# Patient Record
Sex: FEMALE | Race: BLACK OR AFRICAN AMERICAN | NOT HISPANIC OR LATINO | Employment: UNEMPLOYED | URBAN - METROPOLITAN AREA
[De-identification: names, ages, dates, MRNs, and addresses within clinical notes are randomized per-mention and may not be internally consistent; named-entity substitution may affect disease eponyms.]

---
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
## 2018-06-29 ENCOUNTER — HOSPITAL ENCOUNTER (EMERGENCY)
Facility: HOSPITAL | Age: 18
Discharge: HOME | End: 2018-06-29
Attending: EMERGENCY MEDICINE
Payer: MEDICAID

## 2018-06-29 VITALS
BODY MASS INDEX: 51.91 KG/M2 | RESPIRATION RATE: 16 BRPM | WEIGHT: 293 LBS | SYSTOLIC BLOOD PRESSURE: 112 MMHG | HEIGHT: 63 IN | TEMPERATURE: 98 F | HEART RATE: 98 BPM | DIASTOLIC BLOOD PRESSURE: 69 MMHG | OXYGEN SATURATION: 100 %

## 2018-06-29 DIAGNOSIS — K21.9 GASTROESOPHAGEAL REFLUX DISEASE WITHOUT ESOPHAGITIS: ICD-10-CM

## 2018-06-29 DIAGNOSIS — M62.838 NECK MUSCLE SPASM: Primary | ICD-10-CM

## 2018-06-29 LAB
B-HCG UR QL: NEGATIVE
CTP QC/QA: YES

## 2018-06-29 PROCEDURE — 99284 EMERGENCY DEPT VISIT MOD MDM: CPT | Mod: 25

## 2018-06-29 PROCEDURE — 81025 URINE PREGNANCY TEST: CPT | Performed by: EMERGENCY MEDICINE

## 2018-06-29 RX ORDER — PANTOPRAZOLE SODIUM 20 MG/1
20 TABLET, DELAYED RELEASE ORAL DAILY
Qty: 30 TABLET | Refills: 0 | Status: SHIPPED | OUTPATIENT
Start: 2018-06-29 | End: 2018-10-23 | Stop reason: SDUPTHER

## 2018-06-29 RX ORDER — NAPROXEN 500 MG/1
500 TABLET ORAL 2 TIMES DAILY WITH MEALS
Qty: 60 TABLET | Refills: 0 | Status: SHIPPED | OUTPATIENT
Start: 2018-06-29 | End: 2018-11-06

## 2018-06-29 RX ORDER — METHOCARBAMOL 750 MG/1
1500 TABLET, FILM COATED ORAL EVERY 6 HOURS
Qty: 24 TABLET | Refills: 0 | Status: SHIPPED | OUTPATIENT
Start: 2018-06-29 | End: 2018-07-02

## 2018-06-30 NOTE — ED PROVIDER NOTES
Encounter Date: 6/29/2018    SCRIBE #1 NOTE: I, Suzy Barros, am scribing for, and in the presence of,  Dr. Cordova . I have scribed the entire note.       History     Chief Complaint   Patient presents with    Neck Pain     pt c/o neck pain, upper shoulder pain, and pressure behind her ears for 3 days.     This is a 18 y.o female who presents with neck pain for 3 days. She notes her pain is on both side of her neck and is constant. She describes her pain as dull. She hs associated HA. She rates her pain as a 5/10. She denies weakness, leg swelling, and neck injury.      The history is provided by the patient.     Review of patient's allergies indicates:   Allergen Reactions    Ibuprofen Hives     Past Medical History:   Diagnosis Date    ADHD (attention deficit hyperactivity disorder)     Anxiety     Constipation     Depression     GERD (gastroesophageal reflux disease)     IBS (irritable bowel syndrome)     Ovarian cyst     Schizophrenia in children      History reviewed. No pertinent surgical history.  Family History   Problem Relation Age of Onset    Asthma Mother     Diabetes Father      Social History   Substance Use Topics    Smoking status: Passive Smoke Exposure - Never Smoker    Smokeless tobacco: Never Used    Alcohol use No     Review of Systems   Constitutional: Negative for chills and fever.   HENT: Negative for congestion and sore throat.    Cardiovascular: Negative for leg swelling.   Musculoskeletal: Positive for neck pain.   Neurological: Positive for headaches. Negative for weakness.   All other systems reviewed and are negative.      Physical Exam     Initial Vitals [06/29/18 2231]   BP Pulse Resp Temp SpO2   137/82 98 20 98.2 °F (36.8 °C) 99 %      MAP       --         Physical Exam    Nursing note and vitals reviewed.  Constitutional: She appears well-developed and well-nourished. She is not diaphoretic. No distress.   HENT:   Head: Normocephalic and atraumatic.   Eyes: EOM are  normal. Pupils are equal, round, and reactive to light.   Neck: Normal range of motion and phonation normal. Neck supple. No stridor present. Muscular tenderness present. No spinous process tenderness present. No tracheal deviation present. No neck rigidity.   Sensation is fully intact.     Cardiovascular: Normal rate, regular rhythm, normal heart sounds and intact distal pulses. Exam reveals no gallop and no friction rub.    No murmur heard.  Pulmonary/Chest: Breath sounds normal. No respiratory distress. She has no wheezes. She has no rhonchi. She has no rales.   Musculoskeletal: Normal range of motion.   Neurological: She is alert and oriented to person, place, and time.   Skin: Skin is warm and dry.         ED Course   Procedures  Labs Reviewed   POCT URINE PREGNANCY          Imaging Results    None          Medical Decision Making:   Clinical Tests:   Lab Tests: Ordered and Reviewed            Scribe Attestation:   Scribe #1: I performed the above scribed service and the documentation accurately describes the services I performed. I attest to the accuracy of the note.          Labs Reviewed  Admission on 06/29/2018   Component Date Value Ref Range Status    POC Preg Test, Ur 06/29/2018 Negative  Negative Final     Acceptable 06/29/2018 Yes   Final        Imaging Reviewed    Imaging Results    None         Medications given in ED    Medications - No data to display    This document was produced by a scribe under my direction and in my presence. I agree with the content of the note and have made any necessary edits.     Lobo Cordova MD         Note was created using voice recognition software. Note may have occasional typographical errors that may not have been identified and edited despite good chito initial review prior to signing.  Discharge Medications     Medication List with Changes/Refills   New Medications    METHOCARBAMOL (ROBAXIN) 750 MG TAB    Take 2 tablets (1,500 mg total) by mouth  every 6 (six) hours. for 3 days    NAPROXEN (NAPROSYN) 500 MG TABLET    Take 1 tablet (500 mg total) by mouth 2 (two) times daily with meals.    PANTOPRAZOLE (PROTONIX) 20 MG TABLET    Take 1 tablet (20 mg total) by mouth once daily.   Current Medications    ACETAMINOPHEN (TYLENOL) 500 MG TABLET    Take 1 tablet (500 mg total) by mouth every 6 (six) hours as needed for Pain.    CLONAZEPAM (KLONOPIN) 1 MG TABLET        CLONAZEPAM (KLONOPIN) 2 MG TAB    TK 1 T PO  BID    CLOTRIMAZOLE (LOTRIMIN) 1 % CREA    Place 1 suppository (1 applicator total) vaginally every evening.    DEXTROAMPHETAMINE-AMPHETAMINE (ADDERALL XR) 30 MG 24 HR CAPSULE        FLUOXETINE (PROZAC) 40 MG CAPSULE    TK ONE C PO  QAM    FLUTICASONE (FLONASE) 50 MCG/ACTUATION NASAL SPRAY    2 sprays (100 mcg total) by Each Nare route once daily.    LAMOTRIGINE (LAMICTAL) 25 MG TABLET    Take 25 mg by mouth once daily.    LORATADINE (CLARITIN) 10 MG TABLET    Take 1 tablet (10 mg total) by mouth once daily.    NORGESTIMATE-ETHINYL ESTRADIOL (MONONESSA, 28,) 0.25-35 MG-MCG PER TABLET    Take 1 tablet by mouth once daily.    PROMETHAZINE-DEXTROMETHORPHAN (PROMETHAZINE-DM) 6.25-15 MG/5 ML SYRP    1 teaspoon PO Q 8 hrs PRN cough. DO NOT DRIVE AFTER TAKING MED    TOPIRAMATE (TOPAMAX) 100 MG TABLET        TOPIRAMATE (TOPAMAX) 25 MG TABLET    Take 1 tablet (25 mg total) by mouth 2 (two) times daily.   Discontinued Medications    NAPROXEN (NAPROSYN) 500 MG TABLET    Take 1 tablet (500 mg total) by mouth 2 (two) times daily with meals.    PANTOPRAZOLE (PROTONIX) 20 MG TABLET    Take 20 mg by mouth once daily.             Patient discharged to home in stable condition with instructions to:   1. Please take all meds as prescribed.  2. Follow-up with your primary care doctor   3. Return precautions discussed and patient and/or family/caretaker understands to return to the emergency room for any concerns including worsening of your current symptoms, fever, chills, night  sweats, worsening pain, chest pain, shortness of breath, nausea, vomiting, diarrhea, bleeding, headache, difficulty talking, visual disturbances, weakness, numbness or any other acute concerns          Clinical Impression:     1. Neck muscle spasm    2. Gastroesophageal reflux disease without esophagitis                                 Lobo Cordova MD  07/23/18 2127

## 2018-09-12 ENCOUNTER — HOSPITAL ENCOUNTER (EMERGENCY)
Facility: HOSPITAL | Age: 18
Discharge: HOME | End: 2018-09-12
Attending: EMERGENCY MEDICINE
Payer: MEDICAID

## 2018-09-12 VITALS
TEMPERATURE: 99 F | RESPIRATION RATE: 16 BRPM | HEIGHT: 63 IN | OXYGEN SATURATION: 100 % | HEART RATE: 95 BPM | DIASTOLIC BLOOD PRESSURE: 70 MMHG | BODY MASS INDEX: 51.38 KG/M2 | WEIGHT: 290 LBS | SYSTOLIC BLOOD PRESSURE: 118 MMHG

## 2018-09-12 DIAGNOSIS — J40 BRONCHITIS: Primary | ICD-10-CM

## 2018-09-12 LAB
B-HCG UR QL: NEGATIVE
CTP QC/QA: YES

## 2018-09-12 PROCEDURE — 99283 EMERGENCY DEPT VISIT LOW MDM: CPT

## 2018-09-12 PROCEDURE — 81025 URINE PREGNANCY TEST: CPT | Performed by: EMERGENCY MEDICINE

## 2018-09-12 RX ORDER — PREDNISONE 10 MG/1
10 TABLET ORAL DAILY
Qty: 5 TABLET | Refills: 0 | Status: SHIPPED | OUTPATIENT
Start: 2018-09-12 | End: 2018-09-17

## 2018-09-12 RX ORDER — ALBUTEROL SULFATE 90 UG/1
2 AEROSOL, METERED RESPIRATORY (INHALATION) EVERY 6 HOURS PRN
Qty: 6.7 G | Refills: 0 | Status: SHIPPED | OUTPATIENT
Start: 2018-09-12 | End: 2019-08-19

## 2018-09-13 NOTE — ED PROVIDER NOTES
Encounter Date: 9/12/2018    SCRIBE #1 NOTE: I, Kelly Silva, am scribing for, and in the presence of,  Dr. Howard. I have scribed the following portions of the note - Other sections scribed: HPI, ROS, PE.       History     Chief Complaint   Patient presents with    Shortness of Breath     for one week has feeling short of breath, hard to breath,     18 year old female presents to the ED complaining of shortness of breath for one week. She reports her breathing is worse when she moves around. Denies hx of pneumonia or asthma. She notes a dry nonproductive cough and states she just got over a recent cold.  Positive sick contacts with multiple family members who have had the same symptoms. She notes she has been dehydrated.        The history is provided by the patient.     Review of patient's allergies indicates:   Allergen Reactions    Ibuprofen Hives     Past Medical History:   Diagnosis Date    ADHD (attention deficit hyperactivity disorder)     Anxiety     Constipation     Depression     GERD (gastroesophageal reflux disease)     IBS (irritable bowel syndrome)     Ovarian cyst     Schizophrenia in children      No past surgical history on file.  Family History   Problem Relation Age of Onset    Asthma Mother     Diabetes Father      Social History     Tobacco Use    Smoking status: Passive Smoke Exposure - Never Smoker    Smokeless tobacco: Never Used   Substance Use Topics    Alcohol use: No    Drug use: No     Review of Systems   Constitutional: Negative.  Negative for fever.   HENT: Negative.  Negative for congestion, rhinorrhea and sore throat.    Respiratory: Positive for cough (dry) and shortness of breath.    Cardiovascular: Negative.  Negative for chest pain.   Gastrointestinal: Negative.  Negative for nausea and vomiting.   Genitourinary: Negative.  Negative for dysuria.   Musculoskeletal: Negative.  Negative for myalgias.   Skin: Negative.  Negative for rash.   Neurological:  Negative.  Negative for headaches.   Hematological: Negative.  Negative for adenopathy.   Psychiatric/Behavioral: Negative.  Negative for behavioral problems.   All other systems reviewed and are negative.      Physical Exam     Initial Vitals [09/12/18 1957]   BP Pulse Resp Temp SpO2   113/89 92 18 99 °F (37.2 °C) 99 %      MAP       --         Physical Exam    Nursing note and vitals reviewed.  Constitutional: She appears well-developed and well-nourished. She is Obese .   HENT:   Head: Normocephalic and atraumatic.   Right Ear: Tympanic membrane normal.   Left Ear: Tympanic membrane normal.   Nose: Nose normal.   Mouth/Throat: Uvula is midline.   Eyes: Conjunctivae are normal.   Neck: Normal range of motion. Neck supple.   Cardiovascular: Normal rate.   Pulmonary/Chest: She has wheezes.   Mild end expiratory wheezes heard best at the bases.  And intermittent   Musculoskeletal: Normal range of motion.   Neurological: She is alert and oriented to person, place, and time.   Skin: Skin is warm and dry. Capillary refill takes less than 2 seconds.   Psychiatric: She has a normal mood and affect. Her behavior is normal.         ED Course   Procedures  Labs Reviewed   POCT URINE PREGNANCY          Imaging Results    None                     Scribe Attestation:   Scribe #1: I performed the above scribed service and the documentation accurately describes the services I performed. I attest to the accuracy of the note.    This document was produced by a scribe under my direction and in my presence. I agree with the content of the note and have made any necessary edits.     Rock Howard MD    09/12/2018 8:04 PM           Clinical Impression:     1. Bronchitis                                   Rock Howard MD  09/12/18 2004

## 2018-09-13 NOTE — ED NOTES
"Pt presents with c/o SOB x1 episode last week; Hx of anxienty; pt reports current "sinus cold" with non-prodictuve cough; denies Hx of resp problems; pt able to speak in complete sentences; skin warm, dry; no resp distress noted; resp clear, E/U; pt on RA; NADN: will continue to monitor  "

## 2018-10-01 ENCOUNTER — TELEPHONE (OUTPATIENT)
Dept: ENDOSCOPY | Facility: HOSPITAL | Age: 18
End: 2018-10-01

## 2018-10-01 NOTE — TELEPHONE ENCOUNTER
----- Message from Azul Ibanez sent at 9/27/2018 12:05 PM CDT -----  Contact: self - [phone]  Has acid reflux - is asking for appt - nothing available for me to book - please call patient at

## 2018-11-06 ENCOUNTER — HOSPITAL ENCOUNTER (EMERGENCY)
Facility: HOSPITAL | Age: 18
Discharge: HOME | End: 2018-11-06
Attending: EMERGENCY MEDICINE
Payer: MEDICAID

## 2018-11-06 VITALS
OXYGEN SATURATION: 98 % | SYSTOLIC BLOOD PRESSURE: 118 MMHG | TEMPERATURE: 98 F | WEIGHT: 287 LBS | HEART RATE: 86 BPM | RESPIRATION RATE: 18 BRPM | HEIGHT: 63 IN | BODY MASS INDEX: 50.85 KG/M2 | DIASTOLIC BLOOD PRESSURE: 71 MMHG

## 2018-11-06 DIAGNOSIS — R10.2 SUPRAPUBIC ABDOMINAL PAIN: Primary | ICD-10-CM

## 2018-11-06 LAB
B-HCG UR QL: NEGATIVE
BILIRUB UR QL STRIP: NEGATIVE
C TRACH DNA SPEC QL NAA+PROBE: NOT DETECTED
CLARITY UR: ABNORMAL
COLOR UR: YELLOW
CTP QC/QA: YES
GLUCOSE UR QL STRIP: NEGATIVE
HGB UR QL STRIP: NEGATIVE
KETONES UR QL STRIP: NEGATIVE
LEUKOCYTE ESTERASE UR QL STRIP: NEGATIVE
N GONORRHOEA DNA SPEC QL NAA+PROBE: NOT DETECTED
NITRITE UR QL STRIP: NEGATIVE
PH UR STRIP: 5 [PH] (ref 5–8)
PROT UR QL STRIP: NEGATIVE
SP GR UR STRIP: 1.03 (ref 1–1.03)
URN SPEC COLLECT METH UR: ABNORMAL
UROBILINOGEN UR STRIP-ACNC: NEGATIVE EU/DL

## 2018-11-06 PROCEDURE — 25000003 PHARM REV CODE 250: Performed by: EMERGENCY MEDICINE

## 2018-11-06 PROCEDURE — 81025 URINE PREGNANCY TEST: CPT | Performed by: EMERGENCY MEDICINE

## 2018-11-06 PROCEDURE — 96365 THER/PROPH/DIAG IV INF INIT: CPT

## 2018-11-06 PROCEDURE — 81003 URINALYSIS AUTO W/O SCOPE: CPT

## 2018-11-06 PROCEDURE — 99284 EMERGENCY DEPT VISIT MOD MDM: CPT | Mod: 25

## 2018-11-06 PROCEDURE — 87491 CHLMYD TRACH DNA AMP PROBE: CPT

## 2018-11-06 PROCEDURE — 63600175 PHARM REV CODE 636 W HCPCS: Performed by: EMERGENCY MEDICINE

## 2018-11-06 RX ORDER — ONDANSETRON 4 MG/1
4 TABLET, FILM COATED ORAL EVERY 8 HOURS PRN
Qty: 12 TABLET | Refills: 0 | Status: SHIPPED | OUTPATIENT
Start: 2018-11-06 | End: 2019-05-20

## 2018-11-06 RX ORDER — CEFTRIAXONE 1 G/1
1 INJECTION, POWDER, FOR SOLUTION INTRAMUSCULAR; INTRAVENOUS
Status: DISCONTINUED | OUTPATIENT
Start: 2018-11-06 | End: 2018-11-06

## 2018-11-06 RX ORDER — POLYETHYLENE GLYCOL 3350 17 G/17G
17 POWDER, FOR SOLUTION ORAL 3 TIMES DAILY PRN
Qty: 24 PACKET | Refills: 0 | Status: SHIPPED | OUTPATIENT
Start: 2018-11-06 | End: 2019-05-20

## 2018-11-06 RX ORDER — AZITHROMYCIN 250 MG/1
1000 TABLET, FILM COATED ORAL
Status: COMPLETED | OUTPATIENT
Start: 2018-11-06 | End: 2018-11-06

## 2018-11-06 RX ORDER — DICYCLOMINE HYDROCHLORIDE 20 MG/1
20 TABLET ORAL EVERY 6 HOURS PRN
Qty: 20 TABLET | Refills: 0 | Status: SHIPPED | OUTPATIENT
Start: 2018-11-06 | End: 2019-05-20

## 2018-11-06 RX ORDER — ERGOCALCIFEROL 1.25 MG/1
50000 CAPSULE ORAL
COMMUNITY
End: 2019-08-19

## 2018-11-06 RX ADMIN — AZITHROMYCIN 1000 MG: 250 TABLET, FILM COATED ORAL at 08:11

## 2018-11-06 RX ADMIN — CEFTRIAXONE 1 G: 1 INJECTION, SOLUTION INTRAVENOUS at 08:11

## 2018-11-06 NOTE — ED PROVIDER NOTES
Encounter Date: 11/6/2018    SCRIBE #1 NOTE: I, Nini Mora, am scribing for, and in the presence of,  Mitchel Cintron MD. I have scribed the following portions of the note - Other sections scribed: HPI, ROS.       History     Chief Complaint   Patient presents with    Nausea     Pt c/o lower back pain, nausea, and lower abdominal pain that started yesterday after eating fried chicken wings.  Denies taking pain medication.  Denies fever, vaginal dicharge, pain/burning when urinating.     Abdominal Pain    Back Pain     CC: Nausea    HPI: This is a 18 y.o. F who has GERD, IBS, Ovarian cyst, Depression, Anxiety, ADHD, and Schizophrenia in children who presents to the ED for emergent evaluation of acute nausea that began yesterday. Pt has associated constipation, and pelvic pain. Her LMP was 11/1/2018. She also reports headache to the posterior aspect of the head that she attributes to typical migraine headache. No known drug allergies. No surgical history. Pt denies fever, chills, ear pain, sore throat, eye pain, cough, SOB, CP, abdominal pain, nausea, vomiting, diarrhea, back pain, dysuria, vaginal discharge, arm or leg problems, rash, tobacco use, or alcohol use.      The history is provided by the patient. No  was used.     Review of patient's allergies indicates:   Allergen Reactions    Ibuprofen Hives     Past Medical History:   Diagnosis Date    ADHD (attention deficit hyperactivity disorder)     Anxiety     Constipation     Depression     GERD (gastroesophageal reflux disease)     IBS (irritable bowel syndrome)     Ovarian cyst     Schizophrenia in children      History reviewed. No pertinent surgical history.  Family History   Problem Relation Age of Onset    Asthma Mother     Diabetes Father      Social History     Tobacco Use    Smoking status: Passive Smoke Exposure - Never Smoker    Smokeless tobacco: Never Used   Substance Use Topics    Alcohol use: No    Drug use:  No     Review of Systems   Constitutional: Negative for chills and fever.   HENT: Negative for ear pain and sore throat.    Eyes: Negative for pain.   Respiratory: Negative for cough and shortness of breath.    Cardiovascular: Negative for chest pain.   Gastrointestinal: Positive for constipation and nausea. Negative for abdominal pain, diarrhea and vomiting.   Genitourinary: Positive for pelvic pain. Negative for dysuria and vaginal discharge.   Musculoskeletal: Negative for back pain.        (-) arm or leg problems   Skin: Negative for rash.   Neurological: Positive for headaches.       Physical Exam     Initial Vitals [11/06/18 0559]   BP Pulse Resp Temp SpO2   115/74 91 16 99.1 °F (37.3 °C) 98 %      MAP       --         Physical Exam  The patient was examined specifically for the following:   General:No significant distress, Good color, Warm and dry. Head and neck:Scalp atraumatic, Neck supple. Neurological:Appropriate conversation, Gross motor deficits. Eyes:Conjugate gaze, Clear corneas. ENT: No epistaxis. Cardiac: Regular rate and rhythm, Grossly normal heart tones. Pulmonary: Wheezing, Rales. Gastrointestinal: Abdominal tenderness, Abdominal distention. Musculoskeletal: Extremity deformity, Apparent pain with range of motion of the joints. Skin: Rash.   The findings on examination were normal except for the following:  There was minimal suprapubic abdominal tenderness. Pelvic examination reveals some scant yellowish discharge. There is no uterine or cervical motion tenderness. The lungs are clear the heart tones are normal.  ED Course   Procedures  Labs Reviewed   URINALYSIS, REFLEX TO URINE CULTURE - Abnormal; Notable for the following components:       Result Value    Appearance, UA Hazy (*)     All other components within normal limits    Narrative:     Preferred Collection Type->Urine, Clean Catch   C. TRACHOMATIS/N. GONORRHOEAE BY AMP DNA   POCT URINE PREGNANCY          Imaging Results    None        Medical decision making:  Given the above this patient presents to the emergency room with mild suprapubic abdominal pain. Patient has scant yellow vaginal discharge. There was no adnexal tenderness mass cervical motion tenderness uterine tenderness.  Patient's urinalysis is negative the patient had a slight yellow discharge. She was treated empirically with Rocephin and Zithromax.  I will discharge her on dicyclomine and Zofran to follow up primary care.  I will also use ibuprofen.  I specifically doubt appendicitis urinary tract infection pelvic inflammatory disease torsion of an ovary.                Scribe Attestation:   Scribe #1: I performed the above scribed service and the documentation accurately describes the services I performed. I attest to the accuracy of the note.    Attending Attestation:           Physician Attestation for Scribe:  Physician Attestation Statement for Scribe #1: I, Mitchel Cintron MD, reviewed documentation, as scribed by Nini Mora in my presence, and it is both accurate and complete.                    Clinical Impression:   The encounter diagnosis was Suprapubic abdominal pain.                             Mitchel Cintron MD  11/06/18 0944

## 2018-11-06 NOTE — ED TRIAGE NOTES
Patient presents to the ER with mother via personal vehicle. Patient presents with lower abdominal pain, nausea without vomiting since yesterday. Reports back pain, and headaches. Denies chest pain, SOB. 5/10 pain

## 2018-11-06 NOTE — DISCHARGE INSTRUCTIONS
PleaseFollow-up with the OBGYN doctor above.  Please return immediately if you get worse or if new problems develop Tylenol and dicyclomine for pain.  Rest.  Zofran for nausea and vomiting.  Clear liquids only while you have nausea and vomiting. Please follow-up with the OBGYN doctor above, or your OBGYN doctor, this week.  Rest.

## 2019-01-28 ENCOUNTER — HOSPITAL ENCOUNTER (EMERGENCY)
Facility: HOSPITAL | Age: 19
Discharge: HOME | End: 2019-01-28
Attending: INTERNAL MEDICINE
Payer: MEDICAID

## 2019-01-28 VITALS
SYSTOLIC BLOOD PRESSURE: 123 MMHG | DIASTOLIC BLOOD PRESSURE: 83 MMHG | TEMPERATURE: 98 F | BODY MASS INDEX: 50.68 KG/M2 | HEIGHT: 63 IN | OXYGEN SATURATION: 100 % | WEIGHT: 286 LBS | HEART RATE: 93 BPM | RESPIRATION RATE: 17 BRPM

## 2019-01-28 DIAGNOSIS — L73.2 HIDRADENITIS AXILLARIS: Primary | ICD-10-CM

## 2019-01-28 LAB
B-HCG UR QL: NEGATIVE
CTP QC/QA: YES

## 2019-01-28 PROCEDURE — 25000003 PHARM REV CODE 250: Mod: ER | Performed by: INTERNAL MEDICINE

## 2019-01-28 PROCEDURE — 81025 URINE PREGNANCY TEST: CPT | Mod: ER | Performed by: INTERNAL MEDICINE

## 2019-01-28 PROCEDURE — 99283 EMERGENCY DEPT VISIT LOW MDM: CPT | Mod: ER

## 2019-01-28 RX ORDER — DOXYCYCLINE 100 MG/1
100 CAPSULE ORAL 2 TIMES DAILY
Qty: 20 CAPSULE | Refills: 0 | Status: SHIPPED | OUTPATIENT
Start: 2019-01-28 | End: 2019-02-07

## 2019-01-28 RX ORDER — DOXYCYCLINE HYCLATE 100 MG
100 TABLET ORAL
Status: COMPLETED | OUTPATIENT
Start: 2019-01-28 | End: 2019-01-28

## 2019-01-28 RX ADMIN — DOXYCYCLINE HYCLATE 100 MG: 100 TABLET, COATED ORAL at 10:01

## 2019-01-29 NOTE — ED PROVIDER NOTES
Encounter Date: 1/28/2019    SCRIBE #1 NOTE: I, Rudolph Guzmán, am scribing for, and in the presence of,  Dr. Lance. I have scribed the following portions of the note - Other sections scribed: HPI, ROS, PE.       History     Chief Complaint   Patient presents with    axilla pain     Pt reports lump in her right axilla that has been there 4 months. Denies any drainage     Felicity Cohen is a 18 y.o. with female PMHx of Schizophrenia who presents to the ED complaining of a lump in her right axilla present for the past 4 months. Pt did not go see her PCP in regards to this lump and she denies drainage. Pt states she shaved and noticed the lump there after, but it began hurting over time and did not heal.      The history is provided by the patient.     Review of patient's allergies indicates:   Allergen Reactions    Ibuprofen Hives     Past Medical History:   Diagnosis Date    ADHD (attention deficit hyperactivity disorder)     Anxiety     Constipation     Depression     GERD (gastroesophageal reflux disease)     IBS (irritable bowel syndrome)     Ovarian cyst     Schizophrenia in children      History reviewed. No pertinent surgical history.  Family History   Problem Relation Age of Onset    Asthma Mother     Diabetes Father      Social History     Tobacco Use    Smoking status: Passive Smoke Exposure - Never Smoker    Smokeless tobacco: Never Used   Substance Use Topics    Alcohol use: No    Drug use: No     Review of Systems   Constitutional: Negative for fever.   HENT: Negative for sore throat.    Respiratory: Negative for shortness of breath.    Cardiovascular: Negative for chest pain.   Gastrointestinal: Negative for nausea.   Genitourinary: Negative for dysuria.   Musculoskeletal: Negative for back pain.   Skin: Negative for rash.        Positive for right axilla skin lump.   Neurological: Negative for weakness.   Hematological: Does not bruise/bleed easily.       Physical Exam     Initial Vitals  [01/28/19 2122]   BP Pulse Resp Temp SpO2   123/83 93 17 98.3 °F (36.8 °C) 100 %      MAP       --         Physical Exam    Nursing note and vitals reviewed.  Constitutional: She appears well-developed and well-nourished. She is Obese .   HENT:   Head: Normocephalic and atraumatic.   Right Ear: External ear normal.   Left Ear: External ear normal.   Nose: Nose normal.   Mouth/Throat: Oropharynx is clear and moist.   Eyes: Conjunctivae and EOM are normal. Pupils are equal, round, and reactive to light.   Neck: Normal range of motion and phonation normal. Neck supple.   Cardiovascular: Normal rate, regular rhythm, normal heart sounds and intact distal pulses. Exam reveals no gallop and no friction rub.    No murmur heard.  Pulmonary/Chest: Effort normal and breath sounds normal. No stridor. No respiratory distress. She has no wheezes. She has no rhonchi. She has no rales. She exhibits no tenderness.   Abdominal: Soft. Bowel sounds are normal. She exhibits no distension. There is no tenderness. There is no rigidity, no rebound and no guarding.   Musculoskeletal: Normal range of motion. She exhibits no edema or tenderness.   Neurological: She is alert and oriented to person, place, and time. She has normal strength. No cranial nerve deficit or sensory deficit. GCS score is 15. GCS eye subscore is 4. GCS verbal subscore is 5. GCS motor subscore is 6.   Skin: Skin is warm and dry. Capillary refill takes less than 2 seconds. No rash noted.        Psychiatric: She has a normal mood and affect. Her behavior is normal.         ED Course   Procedures  Labs Reviewed   POCT URINE PREGNANCY          Imaging Results    None          Medical Decision Making:   History:   Old Medical Records: I decided to obtain old medical records.  Initial Assessment:   Felicity Cohen is a 18 y.o. with female PMHx of Schizophrenia who presents to the ED complaining of a lump in her right axilla present for the past 4 months. Pt did not go see  her PCP in regards to this lump and she denies drainage. Pt states she shaved and noticed the lump there after, but it began hurting over time and did not heal.    Clinical Tests:   Lab Tests: Ordered and Reviewed            Scribe Attestation:   Scribe #1: I performed the above scribed service and the documentation accurately describes the services I performed. I attest to the accuracy of the note.    This document was produced by a scribe under my direction and in my presence. I agree with the content of the note and have made any necessary edits.     Dr. Lance    01/29/2019 1:49 AM             Clinical Impression:     1. Hidradenitis axillaris            Disposition:   Disposition: Discharged  Condition: Stable                        Hemanth Lance MD  01/29/19 0149

## 2019-04-07 ENCOUNTER — HOSPITAL ENCOUNTER (EMERGENCY)
Facility: HOSPITAL | Age: 19
Discharge: HOME | End: 2019-04-07
Attending: EMERGENCY MEDICINE
Payer: MEDICAID

## 2019-04-07 VITALS
SYSTOLIC BLOOD PRESSURE: 99 MMHG | TEMPERATURE: 98 F | BODY MASS INDEX: 51.21 KG/M2 | WEIGHT: 289 LBS | RESPIRATION RATE: 20 BRPM | OXYGEN SATURATION: 100 % | HEIGHT: 63 IN | DIASTOLIC BLOOD PRESSURE: 65 MMHG | HEART RATE: 79 BPM

## 2019-04-07 DIAGNOSIS — R07.9 CHEST PAIN: ICD-10-CM

## 2019-04-07 DIAGNOSIS — R07.89 CHEST WALL PAIN: Primary | ICD-10-CM

## 2019-04-07 LAB
ALBUMIN SERPL-MCNC: 3.9 G/DL
ALP SERPL-CCNC: 62 U/L
B-HCG UR QL: NEGATIVE
BILIRUB SERPL-MCNC: 0.6 MG/DL
BUN SERPL-MCNC: 8 MG/DL
CALCIUM SERPL-MCNC: 9.6 MG/DL
CHLORIDE SERPL-SCNC: 106 MMOL/L
CREAT SERPL-MCNC: 0.8 MG/DL
CTP QC/QA: YES
GLUCOSE SERPL-MCNC: 82 MG/DL (ref 70–110)
POC ALT (SGPT): 26 U/L
POC AST (SGOT): 27 U/L
POC B-TYPE NATRIURETIC PEPTIDE: <5 PG/ML (ref 0–100)
POC CARDIAC TROPONIN I: 0 NG/ML
POC TCO2: 26 MMOL/L
POTASSIUM BLD-SCNC: 3.9 MMOL/L
PROTEIN, POC: 7.4 G/DL
SAMPLE: NORMAL
SODIUM BLD-SCNC: 144 MMOL/L

## 2019-04-07 PROCEDURE — 93005 ELECTROCARDIOGRAM TRACING: CPT | Mod: ER

## 2019-04-07 PROCEDURE — 81025 URINE PREGNANCY TEST: CPT | Mod: ER | Performed by: EMERGENCY MEDICINE

## 2019-04-07 PROCEDURE — 85025 COMPLETE CBC W/AUTO DIFF WBC: CPT | Mod: ER

## 2019-04-07 PROCEDURE — 93010 EKG 12-LEAD: ICD-10-PCS | Mod: ,,, | Performed by: INTERNAL MEDICINE

## 2019-04-07 PROCEDURE — 99285 EMERGENCY DEPT VISIT HI MDM: CPT | Mod: 25,ER

## 2019-04-07 PROCEDURE — 80053 COMPREHEN METABOLIC PANEL: CPT | Mod: ER

## 2019-04-07 PROCEDURE — 25000003 PHARM REV CODE 250: Mod: ER | Performed by: NURSE PRACTITIONER

## 2019-04-07 PROCEDURE — 93010 ELECTROCARDIOGRAM REPORT: CPT | Mod: ,,, | Performed by: INTERNAL MEDICINE

## 2019-04-07 PROCEDURE — 84484 ASSAY OF TROPONIN QUANT: CPT | Mod: ER

## 2019-04-07 PROCEDURE — 83880 ASSAY OF NATRIURETIC PEPTIDE: CPT | Mod: ER

## 2019-04-07 RX ORDER — ACETAMINOPHEN 500 MG
1000 TABLET ORAL ONCE
Status: COMPLETED | OUTPATIENT
Start: 2019-04-07 | End: 2019-04-07

## 2019-04-07 RX ORDER — METHOCARBAMOL 500 MG/1
1000 TABLET, FILM COATED ORAL EVERY 6 HOURS PRN
Qty: 30 TABLET | Refills: 0 | Status: SHIPPED | OUTPATIENT
Start: 2019-04-07 | End: 2019-08-19

## 2019-04-07 RX ADMIN — ACETAMINOPHEN 1000 MG: 500 TABLET, FILM COATED ORAL at 12:04

## 2019-04-07 NOTE — ED PROVIDER NOTES
"Encounter Date: 4/7/2019    SCRIBE #1 NOTE: I, Trista Moe, am scribing for, and in the presence of,  Vi Wall NP . I have scribed the following portions of the note - Other sections scribed: HPI,ROS,PE .       History     Chief Complaint   Patient presents with    Chest Pain     Pt states," Chest pain and and my arms hurt for three days."     17 y/o/f with a hx of GERD presents with chest pain after eating for 3 days. Also complaining of nausea. Denies cough, recent travel or recent surgeries. She does not take birth control.  Patient has a history of ADHD, anxiety GERD and IBS.  Patient states the pain does not feel like her GERD.  Patient states she takes Tums and Omeprazole for GERD.    The history is provided by the patient. No  was used.   Chest Pain   The current episode started several days ago. Chest pain occurs intermittently. Associated with: eating. At its most intense, the chest pain is at 5/10. The chest pain is currently at 5/10. The quality of the pain is described as aching. The pain does not radiate. Primary symptoms include nausea. Pertinent negatives for primary symptoms include no fever, no shortness of breath, no cough, no abdominal pain and no vomiting.   Nausea began yesterday. She tried nothing for the symptoms. Risk factors include obesity.     Review of patient's allergies indicates:   Allergen Reactions    Ibuprofen Hives     Past Medical History:   Diagnosis Date    ADHD (attention deficit hyperactivity disorder)     Anxiety     Constipation     Depression     GERD (gastroesophageal reflux disease)     IBS (irritable bowel syndrome)     Ovarian cyst     Schizophrenia in children      History reviewed. No pertinent surgical history.  Family History   Problem Relation Age of Onset    Asthma Mother     Diabetes Father      Social History     Tobacco Use    Smoking status: Passive Smoke Exposure - Never Smoker    Smokeless tobacco: Never Used "   Substance Use Topics    Alcohol use: No    Drug use: No     Review of Systems   Constitutional: Negative.  Negative for fever.   HENT: Negative.    Eyes: Negative.    Respiratory: Negative.  Negative for cough and shortness of breath.    Cardiovascular: Positive for chest pain.   Gastrointestinal: Positive for nausea. Negative for abdominal pain and vomiting.   Endocrine: Negative.    Genitourinary: Negative.    Musculoskeletal: Negative.    Skin: Negative.    Allergic/Immunologic: Negative.    Neurological: Negative.    Hematological: Negative.    Psychiatric/Behavioral: Negative.    All other systems reviewed and are negative.      Physical Exam     Initial Vitals [04/07/19 1224]   BP Pulse Resp Temp SpO2   131/85 100 16 98 °F (36.7 °C) 99 %      MAP       --         Physical Exam    Nursing note and vitals reviewed.  Constitutional: She appears well-developed.   HENT:   Right Ear: External ear normal.   Left Ear: External ear normal.   Nose: Nose normal.   Mouth/Throat: Oropharynx is clear and moist.   Eyes: Conjunctivae are normal.   Neck: Normal range of motion. Neck supple.   Cardiovascular: Normal rate, regular rhythm, normal heart sounds and intact distal pulses. Exam reveals no gallop and no friction rub.    No murmur heard.  Pulmonary/Chest: Effort normal and breath sounds normal. No respiratory distress. She has no wheezes. She exhibits no bony tenderness and no deformity.   Abdominal: Soft.   Musculoskeletal: Normal range of motion. She exhibits no edema or tenderness.   Neurological: She is alert and oriented to person, place, and time.   Skin: Skin is warm and dry. No rash noted.   Psychiatric: She has a normal mood and affect.         ED Course   Procedures  Labs Reviewed   POCT URINE PREGNANCY     EKG Readings: (Independently Interpreted)   Rhythm: Normal Sinus Rhythm. Heart Rate: 86. Ectopy: No Ectopy. Conduction: Normal. ST Segments: Normal ST Segments. T Waves: Normal. Axis: Normal. Clinical  Impression: Normal Sinus Rhythm   Normal sinus rhythm       Imaging Results    None       Imaging Results          X-Ray Chest PA And Lateral (Final result)  Result time 04/07/19 13:01:30    Final result by Marcos Baxter MD (04/07/19 13:01:30)                 Impression:      No acute abnormality.      Electronically signed by: Marcos Baxter MD  Date:    04/07/2019  Time:    13:01             Narrative:    EXAMINATION:  XR CHEST PA AND LATERAL    CLINICAL HISTORY:  chest wall pain;    TECHNIQUE:  PA and lateral views of the chest were performed.    COMPARISON:  [facility] 06/20/2016    FINDINGS:  The lungs are clear, with normal appearance of pulmonary vasculature and no pleural effusion or pneumothorax.    The cardiac silhouette is normal in size. The hilar and mediastinal contours are unremarkable.    Bones are intact.                                    Medical Decision Making:   Initial Assessment:   19 y/o/f presents with chest pain for 3 days.  Patient states the pain is intermittent.  Patient denies shortness of breath, radiation of pain, and diaphoresis.    Differential Diagnosis:   Acute MI, GERD, Costochondritis, Pneumonia  Independently Interpreted Test(s):   I have ordered and independently interpreted EKG Reading(s) - see prior notes  Clinical Tests:   Lab Tests: Reviewed and Ordered       <> Summary of Lab: CMP- sodium 144, potassium 3.9, bicarb 26, BUN 8, creatinine 0.8,   Troponin 0.00  CBC-- WBC 6.9, hemoglobin 12.8, hematocrit 38.6, platelets 302  Radiological Study: Ordered and Reviewed  Medical Tests: Ordered and Reviewed  ED Management:  Medicated with Tylenol 1000 mg orally. Pt was not given ASA due to allergy to Motrin.   Upon reassessment. Pt stated that the pain increase with movement. She continues to deny heavy lifting.   Discharge home with Robaxin.  Patient instructed to take Tylenol as needed for pain due to Motrin allergies.  Follow-up with PCP in 2 days.  Return ED for worsening of  symptoms.            Scribe Attestation:   Scribe #1: I performed the above scribed service and the documentation accurately describes the services I performed. I attest to the accuracy of the note.    This document was produced by a scribe under my direction and in my presence. I agree with the content of the note and have made any necessary edits.     ANUJA Kurtz    04/07/2019 1:31 PM           Clinical Impression:     1. Chest wall pain    2. Chest pain                                   ANUJA Juarez  04/07/19 [id number]

## 2019-04-07 NOTE — DISCHARGE INSTRUCTIONS
Follow-up with PCP in 1-2 days.  Return ED for worsening of symptoms.  Tylenol as needed for pain.

## 2019-05-20 ENCOUNTER — HOSPITAL ENCOUNTER (EMERGENCY)
Facility: HOSPITAL | Age: 19
Discharge: HOME | End: 2019-05-20
Attending: EMERGENCY MEDICINE
Payer: MEDICAID

## 2019-05-20 VITALS
OXYGEN SATURATION: 96 % | DIASTOLIC BLOOD PRESSURE: 73 MMHG | TEMPERATURE: 98 F | HEART RATE: 82 BPM | RESPIRATION RATE: 18 BRPM | SYSTOLIC BLOOD PRESSURE: 135 MMHG | BODY MASS INDEX: 49.51 KG/M2 | WEIGHT: 290 LBS | HEIGHT: 64 IN

## 2019-05-20 DIAGNOSIS — H92.02 OTALGIA OF LEFT EAR: ICD-10-CM

## 2019-05-20 DIAGNOSIS — J02.9 PHARYNGITIS, UNSPECIFIED ETIOLOGY: Primary | ICD-10-CM

## 2019-05-20 LAB
B-HCG UR QL: NEGATIVE
CTP QC/QA: YES
DEPRECATED S PYO AG THROAT QL EIA: NEGATIVE

## 2019-05-20 PROCEDURE — 99284 EMERGENCY DEPT VISIT MOD MDM: CPT | Mod: 25

## 2019-05-20 PROCEDURE — 81025 URINE PREGNANCY TEST: CPT | Performed by: PHYSICIAN ASSISTANT

## 2019-05-20 PROCEDURE — 87880 STREP A ASSAY W/OPTIC: CPT

## 2019-05-20 PROCEDURE — 96372 THER/PROPH/DIAG INJ SC/IM: CPT

## 2019-05-20 PROCEDURE — 87081 CULTURE SCREEN ONLY: CPT

## 2019-05-20 PROCEDURE — 63600175 PHARM REV CODE 636 W HCPCS: Performed by: PHYSICIAN ASSISTANT

## 2019-05-20 RX ORDER — KETOROLAC TROMETHAMINE 30 MG/ML
15 INJECTION, SOLUTION INTRAMUSCULAR; INTRAVENOUS
Status: COMPLETED | OUTPATIENT
Start: 2019-05-20 | End: 2019-05-20

## 2019-05-20 RX ORDER — FLUTICASONE PROPIONATE 50 MCG
1 SPRAY, SUSPENSION (ML) NASAL 2 TIMES DAILY PRN
Qty: 15 G | Refills: 0 | Status: SHIPPED | OUTPATIENT
Start: 2019-05-20 | End: 2019-08-19

## 2019-05-20 RX ORDER — ACETAMINOPHEN 325 MG/1
650 TABLET ORAL
Status: DISCONTINUED | OUTPATIENT
Start: 2019-05-20 | End: 2019-05-20

## 2019-05-20 RX ORDER — KETOROLAC TROMETHAMINE 10 MG/1
10 TABLET, FILM COATED ORAL EVERY 6 HOURS
Qty: 12 TABLET | Refills: 0 | Status: SHIPPED | OUTPATIENT
Start: 2019-05-20 | End: 2019-08-19

## 2019-05-20 RX ADMIN — KETOROLAC TROMETHAMINE 15 MG: 30 INJECTION, SOLUTION INTRAMUSCULAR; INTRAVENOUS at 07:05

## 2019-05-21 NOTE — ED TRIAGE NOTES
Pt here with mother for left ear pain, left head pain and sore throat that all began this morning. Pt attempted treatment with tylenol, no relief.

## 2019-05-21 NOTE — ED PROVIDER NOTES
Encounter Date: 5/20/2019       History     Chief Complaint   Patient presents with    Otalgia     Patient presents to the ER via personal vehicle. Patient presents with left ear pain, headache to the left side, and sore throat. Symptoms started this morning     18-year-old female with GERD, IBS, constipation, anxiety, ADHD complains of left-sided sore throat and left otalgia since this morning.  She reports pain worsened with swallowing.  She denies difficulty swallowing or controlling secretions.  She also denies otorrhea, hearing loss, fever, neck stiffness. She took Tylenol for symptoms with little relief.        Review of patient's allergies indicates:   Allergen Reactions    Ibuprofen Hives     Past Medical History:   Diagnosis Date    ADHD (attention deficit hyperactivity disorder)     Anxiety     Constipation     Depression     GERD (gastroesophageal reflux disease)     IBS (irritable bowel syndrome)     Ovarian cyst     Schizophrenia in children      History reviewed. No pertinent surgical history.  Family History   Problem Relation Age of Onset    Asthma Mother     Diabetes Father      Social History     Tobacco Use    Smoking status: Passive Smoke Exposure - Never Smoker    Smokeless tobacco: Never Used   Substance Use Topics    Alcohol use: No    Drug use: No     Review of Systems   Constitutional: Negative for fever.   HENT: Positive for ear pain and sore throat. Negative for dental problem, drooling, ear discharge, tinnitus and trouble swallowing.    Respiratory: Negative for cough and shortness of breath.    Cardiovascular: Negative for chest pain.   Musculoskeletal: Negative for back pain and neck stiffness.   Skin: Negative for rash.   Hematological: Negative for adenopathy.       Physical Exam     Initial Vitals [05/20/19 1917]   BP Pulse Resp Temp SpO2   (!) 159/72 90 18 98.6 °F (37 °C) 99 %      MAP       --         Physical Exam    Vitals reviewed.  Constitutional: She appears  well-developed and well-nourished. She is not diaphoretic. No distress.   HENT:   Head: Normocephalic and atraumatic.   Right Ear: Tympanic membrane and external ear normal. No mastoid tenderness. No middle ear effusion.   Left Ear: Tympanic membrane and external ear normal. No mastoid tenderness.  No middle ear effusion.   Nose: Nose normal.   Mouth/Throat: No oropharyngeal exudate.   Eyes: Conjunctivae are normal. No scleral icterus.   Neck: Normal range of motion. Neck supple.   Cardiovascular: Normal rate and regular rhythm.   Pulmonary/Chest: No respiratory distress.   Musculoskeletal: Normal range of motion.   Lymphadenopathy:     She has no cervical adenopathy.   Neurological: She is alert and oriented to person, place, and time.   Skin: Skin is warm and dry.         ED Course   Procedures  Labs Reviewed   THROAT SCREEN, RAPID   CULTURE, STREP A,  THROAT   POCT URINE PREGNANCY          Imaging Results    None          Medical Decision Making:   ED Management:  17 yo pt presenting with worsening of sore throat and left otalgia with reassuring exam.  No history of immunocompromise. Pt w no trismus and no airway compromise. Able to tolerate PO. Unlikely PTA, RPA, Ludwigs, epiglottitis, acute HIV, or EBV due to HPI and physical exam.  Rapid strep negative. Nontoxic appearance.    Plan to DC home with NSAID and Flonase. Return precautions given, patient understands and agrees with plan. All questions answered.  Instructed to follow up with PCP.                        Clinical Impression:       ICD-10-CM ICD-9-CM   1. Pharyngitis, unspecified etiology J02.9 462   2. Otalgia of left ear H92.02 388.70                                Eb Banuelos PA-C  05/20/19 7411

## 2019-05-22 LAB — BACTERIA THROAT CULT: NORMAL

## 2019-08-19 ENCOUNTER — HOSPITAL ENCOUNTER (EMERGENCY)
Facility: HOSPITAL | Age: 19
Discharge: HOME | End: 2019-08-20
Attending: EMERGENCY MEDICINE
Payer: MEDICAID

## 2019-08-19 DIAGNOSIS — N93.8 DYSFUNCTIONAL UTERINE BLEEDING: Primary | ICD-10-CM

## 2019-08-19 LAB
B-HCG UR QL: NEGATIVE
CTP QC/QA: YES

## 2019-08-19 PROCEDURE — 99283 EMERGENCY DEPT VISIT LOW MDM: CPT | Mod: 25,ER

## 2019-08-19 PROCEDURE — 81025 URINE PREGNANCY TEST: CPT | Mod: ER | Performed by: NURSE PRACTITIONER

## 2019-08-20 VITALS
OXYGEN SATURATION: 100 % | WEIGHT: 278 LBS | BODY MASS INDEX: 49.26 KG/M2 | SYSTOLIC BLOOD PRESSURE: 121 MMHG | HEART RATE: 88 BPM | RESPIRATION RATE: 18 BRPM | HEIGHT: 63 IN | DIASTOLIC BLOOD PRESSURE: 80 MMHG | TEMPERATURE: 99 F

## 2019-08-20 PROCEDURE — 87801 DETECT AGNT MULT DNA AMPLI: CPT

## 2019-08-20 PROCEDURE — 87661 TRICHOMONAS VAGINALIS AMPLIF: CPT

## 2019-08-20 PROCEDURE — 87491 CHLMYD TRACH DNA AMP PROBE: CPT | Mod: 59

## 2019-08-20 PROCEDURE — 87481 CANDIDA DNA AMP PROBE: CPT | Mod: 59

## 2019-08-20 NOTE — ED PROVIDER NOTES
Encounter Date: 8/19/2019       History     Chief Complaint   Patient presents with    Vaginal Bleeding     pt presents to ER with c/o a sudden onset of heavy vaginal bleeding today accompanied by leg cramps and headache.  She states she already had a menstrual cycle at beginning of moth.  Bleeding has lightened up tonight.     The history is provided by the patient. No  was used.   Female  Problem   Primary symptoms include vaginal bleeding.    Primary symptoms include no fever, no dyspareunia, no dysuria.   This is a new problem. The current episode started today. The problem occurs 2 - 4 times per day. The problem has been gradually improving. The symptoms occur spontaneously. She is not pregnant. Her LMP was weeks ago. The discharge was bloody. Pertinent negatives include no anorexia, no diaphoresis, no abdominal swelling, no abdominal pain, no constipation, no diarrhea, no nausea, no vomiting, no frequency, no light-headedness and no dizziness. She has tried nothing for the symptoms. Sexual activity: not sexually active. There is no concern regarding sexually transmitted diseases.     Review of patient's allergies indicates:   Allergen Reactions    Ibuprofen Hives     Past Medical History:   Diagnosis Date    ADHD (attention deficit hyperactivity disorder)     Anxiety     Constipation     Depression     GERD (gastroesophageal reflux disease)     IBS (irritable bowel syndrome)     Ovarian cyst     Schizophrenia in children      History reviewed. No pertinent surgical history.  Family History   Problem Relation Age of Onset    Asthma Mother     Diabetes Father      Social History     Tobacco Use    Smoking status: Passive Smoke Exposure - Never Smoker    Smokeless tobacco: Never Used   Substance Use Topics    Alcohol use: No    Drug use: No     Review of Systems   Constitutional: Negative.  Negative for appetite change, diaphoresis and fever.   HENT: Negative.  Negative for  congestion, dental problem, ear discharge, hearing loss, mouth sores, rhinorrhea and trouble swallowing.    Eyes: Negative.  Negative for pain and discharge.   Respiratory: Negative.  Negative for shortness of breath.    Cardiovascular: Negative.  Negative for chest pain.   Gastrointestinal: Negative.  Negative for abdominal distention, abdominal pain, anorexia, constipation, diarrhea, nausea, rectal pain and vomiting.   Endocrine: Negative.    Genitourinary: Positive for vaginal bleeding. Negative for dyspareunia, dysuria, frequency, hematuria, vaginal discharge and vaginal pain.   Musculoskeletal: Negative.  Negative for back pain and neck pain.   Skin: Negative.  Negative for rash.   Allergic/Immunologic: Negative.    Neurological: Negative.  Negative for dizziness, facial asymmetry, speech difficulty and light-headedness.   Hematological: Negative.    Psychiatric/Behavioral: Negative.  Negative for agitation, dysphoric mood and sleep disturbance.   All other systems reviewed and are negative.      Physical Exam     Initial Vitals [08/19/19 2322]   BP Pulse Resp Temp SpO2   117/75 86 18 99.4 °F (37.4 °C) 99 %      MAP       --         Physical Exam    Nursing note and vitals reviewed.  Constitutional: She appears well-developed and well-nourished. She is not diaphoretic.  Non-toxic appearance. She does not appear ill. No distress.   HENT:   Head: Normocephalic and atraumatic.   Eyes: Conjunctivae are normal. Right eye exhibits no discharge. Left eye exhibits no discharge.   Neck: Normal range of motion.   Cardiovascular: Normal rate, regular rhythm, normal heart sounds and intact distal pulses. Exam reveals no gallop and no friction rub.    No murmur heard.  Pulmonary/Chest: Breath sounds normal. No respiratory distress. She has no wheezes. She has no rhonchi. She has no rales. She exhibits no tenderness.   Abdominal: Soft. Normal appearance and bowel sounds are normal. She exhibits no shifting dullness, no  distension, no pulsatile liver, no fluid wave, no abdominal bruit, no ascites, no pulsatile midline mass and no mass. There is no hepatosplenomegaly. There is no tenderness. There is no rebound and no guarding. No hernia. Hernia confirmed negative in the right inguinal area and confirmed negative in the left inguinal area.   Genitourinary: Pelvic exam was performed with patient prone. No labial fusion. There is no rash on the right labia. There is no rash on the left labia. Cervix exhibits no motion tenderness, no discharge and no friability. Right adnexum displays no mass, no tenderness and no fullness. Left adnexum displays no mass, no tenderness and no fullness. There is bleeding in the vagina. No erythema or tenderness in the vagina. No foreign body in the vagina. No signs of injury around the vagina. No vaginal discharge found.       Musculoskeletal: Normal range of motion.   Lymphadenopathy:        Right: No inguinal adenopathy present.        Left: No inguinal adenopathy present.   Neurological: She is alert and oriented to person, place, and time.   Skin: Skin is warm and dry. Capillary refill takes less than 2 seconds. No rash noted.   Psychiatric: She has a normal mood and affect. Her behavior is normal. Judgment and thought content normal.         ED Course   Procedures  Labs Reviewed   POCT URINE PREGNANCY          Imaging Results    None          Medical Decision Making:   Initial Assessment:   Dysfunctional uterine bleeding  Differential Diagnosis:   Pregnancy, STD  Clinical Tests:   Lab Tests: Ordered and Reviewed       <> Summary of Lab: Wet prep pending, GC pending  ED Management:  The patient will be discharged home with instructions to follow up with OBGYN tomorrow and refrain from any sexual activity.  Will call the patient if the results of her labs are positive.  Patient is instructed to return to the ER as needed if symptoms worsen or fail to improve.  Patient also instructed to take  over-the-counter Tylenol as needed for any pain.  Patient verbalized understanding of discharge instructions and treatment plan.                      Clinical Impression:       ICD-10-CM ICD-9-CM   1. Dysfunctional uterine bleeding N93.8 626.8                                Toussaint Battley III, [facility]  08/20/19 0002

## 2019-08-20 NOTE — ED PROVIDER NOTES
Encounter Date: 8/19/2019       History     Chief Complaint   Patient presents with    Vaginal Bleeding     pt presents to ER with c/o a sudden onset of heavy vaginal bleeding today accompanied by leg cramps and headache.  She states she already had a menstrual cycle at beginning of moth.  Bleeding has lightened up tonight.     HPI  Review of patient's allergies indicates:   Allergen Reactions    Ibuprofen Hives     Past Medical History:   Diagnosis Date    ADHD (attention deficit hyperactivity disorder)     Anxiety     Constipation     Depression     GERD (gastroesophageal reflux disease)     IBS (irritable bowel syndrome)     Ovarian cyst     Schizophrenia in children      History reviewed. No pertinent surgical history.  Family History   Problem Relation Age of Onset    Asthma Mother     Diabetes Father      Social History     Tobacco Use    Smoking status: Passive Smoke Exposure - Never Smoker    Smokeless tobacco: Never Used   Substance Use Topics    Alcohol use: No    Drug use: No     Review of Systems    Physical Exam     Initial Vitals [08/19/19 2322]   BP Pulse Resp Temp SpO2   117/75 86 18 99.4 °F (37.4 °C) 99 %      MAP       --         Physical Exam    ED Course   Procedures  Labs Reviewed   POCT URINE PREGNANCY          Imaging Results    None                               Clinical Impression:   {Add your Clinical Impression here. If you haven't documented one yet, please pend the note, finalize a Clinical Impression, and refresh your note before signing.:[id number]}

## 2019-08-21 LAB
C TRACH DNA SPEC QL NAA+PROBE: NOT DETECTED
N GONORRHOEA DNA SPEC QL NAA+PROBE: NOT DETECTED

## 2019-08-22 ENCOUNTER — HOSPITAL ENCOUNTER (EMERGENCY)
Facility: HOSPITAL | Age: 19
Discharge: HOME | End: 2019-08-23
Attending: EMERGENCY MEDICINE
Payer: MEDICAID

## 2019-08-22 VITALS
HEIGHT: 63 IN | OXYGEN SATURATION: 98 % | RESPIRATION RATE: 17 BRPM | BODY MASS INDEX: 47.84 KG/M2 | DIASTOLIC BLOOD PRESSURE: 85 MMHG | TEMPERATURE: 99 F | HEART RATE: 88 BPM | SYSTOLIC BLOOD PRESSURE: 130 MMHG | WEIGHT: 270 LBS

## 2019-08-22 DIAGNOSIS — Z86.69 HISTORY OF MIGRAINE: ICD-10-CM

## 2019-08-22 DIAGNOSIS — K59.00 CONSTIPATION, UNSPECIFIED CONSTIPATION TYPE: Primary | ICD-10-CM

## 2019-08-22 DIAGNOSIS — R51.9 FRONTAL HEADACHE: ICD-10-CM

## 2019-08-22 DIAGNOSIS — N93.8 DUB (DYSFUNCTIONAL UTERINE BLEEDING): ICD-10-CM

## 2019-08-22 DIAGNOSIS — R10.30 LOWER ABDOMINAL PAIN: ICD-10-CM

## 2019-08-22 LAB
B-HCG UR QL: NEGATIVE
BACTERIAL VAGINOSIS DNA: NEGATIVE
BILIRUB UR QL STRIP: NEGATIVE
CANDIDA GLABRATA DNA: NEGATIVE
CANDIDA KRUSEI DNA: NEGATIVE
CANDIDA RRNA VAG QL PROBE: NEGATIVE
CLARITY UR: CLEAR
COLOR UR: YELLOW
CTP QC/QA: YES
GLUCOSE UR QL STRIP: NEGATIVE
HGB UR QL STRIP: NEGATIVE
KETONES UR QL STRIP: NEGATIVE
LEUKOCYTE ESTERASE UR QL STRIP: NEGATIVE
NITRITE UR QL STRIP: NEGATIVE
PH UR STRIP: 8 [PH] (ref 5–8)
PROT UR QL STRIP: NEGATIVE
SP GR UR STRIP: 1.02 (ref 1–1.03)
T VAGINALIS RRNA GENITAL QL PROBE: NEGATIVE
URN SPEC COLLECT METH UR: NORMAL
UROBILINOGEN UR STRIP-ACNC: NEGATIVE EU/DL

## 2019-08-22 PROCEDURE — 81025 URINE PREGNANCY TEST: CPT | Performed by: PHYSICIAN ASSISTANT

## 2019-08-22 PROCEDURE — 99283 EMERGENCY DEPT VISIT LOW MDM: CPT

## 2019-08-22 PROCEDURE — 25000003 PHARM REV CODE 250: Performed by: PHYSICIAN ASSISTANT

## 2019-08-22 PROCEDURE — 81003 URINALYSIS AUTO W/O SCOPE: CPT

## 2019-08-22 RX ORDER — DEXTROMETHORPHAN POLISTIREX 30 MG/5 ML
1 SUSPENSION, EXTENDED RELEASE 12 HR ORAL ONCE
Qty: 2 ENEMA | Refills: 0 | Status: SHIPPED | OUTPATIENT
Start: 2019-08-22 | End: 2019-08-22

## 2019-08-22 RX ORDER — BUTALBITAL, ACETAMINOPHEN AND CAFFEINE 50; 325; 40 MG/1; MG/1; MG/1
1 TABLET ORAL EVERY 6 HOURS PRN
Qty: 12 TABLET | Refills: 0 | Status: SHIPPED | OUTPATIENT
Start: 2019-08-22 | End: 2021-08-26 | Stop reason: SDUPTHER

## 2019-08-22 RX ORDER — DOCUSATE SODIUM 100 MG/1
100 CAPSULE, LIQUID FILLED ORAL 2 TIMES DAILY
Qty: 8 CAPSULE | Refills: 0 | Status: SHIPPED | OUTPATIENT
Start: 2019-08-22 | End: 2019-08-26

## 2019-08-22 RX ORDER — BUTALBITAL, ACETAMINOPHEN AND CAFFEINE 50; 325; 40 MG/1; MG/1; MG/1
2 TABLET ORAL ONCE
Status: COMPLETED | OUTPATIENT
Start: 2019-08-22 | End: 2019-08-22

## 2019-08-22 RX ORDER — SYRING-NEEDL,DISP,INSUL,0.3 ML 29 G X1/2"
296 SYRINGE, EMPTY DISPOSABLE MISCELLANEOUS ONCE
Qty: 296 ML | Refills: 0 | Status: SHIPPED | OUTPATIENT
Start: 2019-08-22 | End: 2019-08-22

## 2019-08-22 RX ADMIN — BUTALBITAL, ACETAMINOPHEN AND CAFFEINE 2 TABLET: 50; 325; 40 TABLET ORAL at 11:08

## 2019-08-23 NOTE — ED PROVIDER NOTES
Encounter Date: 8/22/2019    SCRIBE #1 NOTE: I, Vipul Serrano, am scribing for, and in the presence of,  Wil Palmer PA-C. I have scribed the following portions of the note - Other sections scribed: HPI, ROS, and PE.       History     Chief Complaint   Patient presents with    Abdominal Pain     pt reports RLQ abdominal pain and migrane headache ongoing for 3 days; pt reports taking Excedrin & Tylenol with short term relief yesterday but denies taking anything for pain today; pt reports being seen earlier this week at [facility] & dx with dysfuctional uterine bleeding but currently denies any vaginal bleeding    Headache     CC: abdominal pain; headache    31 y/o pt, with a PMHx of constipation, irritable bowel syndrome, and ovarian cysts, presents to the ED c/o right-sided abdominal pain and a headache. Pt states that she has sharp pains on her right side that are radiating towards her back. Pt notes that when she eats she feels unusually full even after small meals. Pt reports that she has never experienced similar symptoms. Additionally, pt states that she has a frontal headache that is causing blurry vision. Pt denies nausea, vomiting, diarrhea, fever, sore throat, SOB, chest pain, eye pain, rash, dysuria, extremity weakness, and other associated symptoms. Pt notes that she used a laxative yesterday to treat constipation, and states that she received mild relief (describes stool as being small pellets). Pt reports that she attempted txs for the headache with Excedrin yesterday, with no relief. Pt denies attempting any txs today PTA. No other alleviating factors.     Of note, pt was evaluated at [facility] on yesterday for abdominal cramping and vaginal bleeding. Pt was diagnosed with dysfunctional uterine bleeding. Pt currently denies vaginal bleeding today. She has a follo-up appointment with her PCP this week.     The history is provided by the patient. No  was used.     Review  of patient's allergies indicates:   Allergen Reactions    Ibuprofen Hives     Past Medical History:   Diagnosis Date    ADHD (attention deficit hyperactivity disorder)     Anxiety     Constipation     Depression     GERD (gastroesophageal reflux disease)     IBS (irritable bowel syndrome)     Ovarian cyst     Schizophrenia in children      No past surgical history on file.  Family History   Problem Relation Age of Onset    Asthma Mother     Diabetes Father      Social History     Tobacco Use    Smoking status: Passive Smoke Exposure - Never Smoker    Smokeless tobacco: Never Used   Substance Use Topics    Alcohol use: No    Drug use: No     Review of Systems   Constitutional: Negative for fever.   HENT: Negative for sore throat.    Eyes: Positive for visual disturbance (blurry vision). Negative for pain.   Respiratory: Negative for shortness of breath.    Cardiovascular: Negative for chest pain.   Gastrointestinal: Positive for abdominal pain and constipation. Negative for diarrhea, nausea and vomiting.   Genitourinary: Negative for dysuria, vaginal bleeding and vaginal discharge.   Musculoskeletal: Positive for back pain.   Skin: Negative for rash.   Neurological: Positive for headaches. Negative for weakness.       Physical Exam     Initial Vitals [08/22/19 2236]   BP Pulse Resp Temp SpO2   130/85 88 17 98.5 °F (36.9 °C) 98 %      MAP       --         Physical Exam    Nursing note and vitals reviewed.  Constitutional: She appears well-developed and well-nourished.   HENT:   Head: Normocephalic and atraumatic.   Right Ear: External ear normal.   Left Ear: External ear normal.   Nose: Nose normal.   Mouth/Throat: Oropharynx is clear and moist.   Eyes: Conjunctivae and EOM are normal. Pupils are equal, round, and reactive to light. No scleral icterus.   Neck: Normal range of motion. Neck supple. No JVD present.   Cardiovascular: Normal rate, regular rhythm, normal heart sounds and intact distal pulses.  Exam reveals no gallop and no friction rub.    No murmur heard.  Pulmonary/Chest: Breath sounds normal. No stridor. No respiratory distress. She has no wheezes. She exhibits no tenderness.   Abdominal: Soft. Bowel sounds are normal. She exhibits no distension and no mass. There is no tenderness. There is no rigidity, no rebound, no guarding, no CVA tenderness, no tenderness at McBurney's point and negative Swenson's sign.   Musculoskeletal: Normal range of motion. She exhibits no edema or tenderness.   Back is nontender to palpation.    Neurological: She is alert and oriented to person, place, and time. She has normal strength. She displays no tremor. No cranial nerve deficit. She displays no seizure activity. Coordination and gait normal.   Skin: Skin is warm and dry. Capillary refill takes less than 2 seconds. No rash noted. No pallor.   Psychiatric: She has a normal mood and affect. Thought content normal.         ED Course   Procedures  Labs Reviewed   URINALYSIS, REFLEX TO URINE CULTURE    Narrative:     Preferred Collection Type->Urine, Clean Catch   POCT URINE PREGNANCY          Imaging Results    None          Medical Decision Making:   History:   Old Medical Records: I decided to obtain old medical records.  Initial Assessment:   19-year-old female with multiple complaints  Clinical Tests:   Lab Tests: Reviewed and Ordered  ED Management:  UPT negative. No evidence of infection on urinalysis.  Recent GC cultures negative. Does not endorse symptoms concerning for obstruction at this time.  Likely has constipation that could be contributing to symptoms. No longer having vaginal bleeding to suggest severe anemia warranting emergent intervention at this time.  Headache is consistent with her past migraines according to patient.  [place] SAH negative. Low suspicion for intracranial mass requiring emergent intervention at this time.  Will treat with Fioricet as this has worked well for patient in the past.  Patient  does not want further investigation and management of her symptoms while in the ED and is only requesting a Fioricet prescription at this time.  Advising she follow up with OBGYN and PCP.  Strict return precautions discussed with patient.  Patient agreeable plan.            Scribe Attestation:   Scribe #1: I performed the above scribed service and the documentation accurately describes the services I performed. I attest to the accuracy of the note.               Clinical Impression:       ICD-10-CM ICD-9-CM   1. Constipation, unspecified constipation type K59.00 564.00   2. DUB (dysfunctional uterine bleeding) N93.8 626.8   3. Frontal headache R51 784.0   4. History of migraine Z86.69 V12.49   5. Lower abdominal pain R10.30 789.09                I, Wil Palmer PA-C, personally performed the services described in this documentation. All medical record entries made by the scribe were at my direction and in my presence.  I have reviewed the chart and agree that the record reflects my personal performance and is accurate and complete.             Wil Palmer PA-C  08/22/19 7981

## 2019-11-24 ENCOUNTER — HOSPITAL ENCOUNTER (EMERGENCY)
Facility: HOSPITAL | Age: 19
Discharge: HOME | End: 2019-11-24
Attending: EMERGENCY MEDICINE
Payer: MEDICAID

## 2019-11-24 VITALS
SYSTOLIC BLOOD PRESSURE: 136 MMHG | DIASTOLIC BLOOD PRESSURE: 78 MMHG | TEMPERATURE: 98 F | WEIGHT: 293 LBS | OXYGEN SATURATION: 100 % | HEIGHT: 63 IN | RESPIRATION RATE: 18 BRPM | HEART RATE: 88 BPM | BODY MASS INDEX: 51.91 KG/M2

## 2019-11-24 DIAGNOSIS — R10.32 LEFT LOWER QUADRANT ABDOMINAL PAIN: Primary | ICD-10-CM

## 2019-11-24 LAB
B-HCG UR QL: NEGATIVE
BILIRUBIN, POC UA: NEGATIVE
BLOOD, POC UA: NEGATIVE
CLARITY, POC UA: NORMAL
COLOR, POC UA: YELLOW
CTP QC/QA: YES
GLUCOSE, POC UA: NEGATIVE
KETONES, POC UA: NEGATIVE
LEUKOCYTE EST, POC UA: NEGATIVE
NITRITE, POC UA: NEGATIVE
PH UR STRIP: 7 [PH]
PROTEIN, POC UA: NEGATIVE
SPECIFIC GRAVITY, POC UA: 1.02
UROBILINOGEN, POC UA: 1 E.U./DL

## 2019-11-24 PROCEDURE — 81025 URINE PREGNANCY TEST: CPT | Mod: ER | Performed by: EMERGENCY MEDICINE

## 2019-11-24 PROCEDURE — 81003 URINALYSIS AUTO W/O SCOPE: CPT | Mod: ER

## 2019-11-24 PROCEDURE — 99284 EMERGENCY DEPT VISIT MOD MDM: CPT | Mod: 25,ER

## 2019-11-24 RX ORDER — NAPROXEN 500 MG/1
500 TABLET ORAL 2 TIMES DAILY WITH MEALS
Qty: 30 TABLET | Refills: 0 | Status: SHIPPED | OUTPATIENT
Start: 2019-11-24 | End: 2022-01-20

## 2019-11-24 NOTE — ED PROVIDER NOTES
Encounter Date: 11/24/2019       History     Chief Complaint   Patient presents with    Back Pain     c/o upper back pain x 2 days. denies taking any medicine for pain    PELVIC PRESSURE     reports pelvic pressure when urinating x 3 days     19 y.o. Female with multiple medical problems including anxiety, schizophrenia, ovarian cyst, GERD, morbid obesity and others presents emergency department complaining of acute, left lower quadrant, intermittent, sharp, nonradiating abdominal pain that began two days ago.  Patient states pain is provoked during the end of urination and is otherwise resolved.  She denies dysuria, urinary frequency, hematuria or vaginal discharge  she further denies nausea, vomiting, diarrhea or food intolerance.      She also reports acute, nontraumatic, upper back pain that began two days ago.  She states pain is worse with certain movements such as opening a door and was bending over and denies pain with breathing, shortness of breath or cough.        Review of patient's allergies indicates:   Allergen Reactions    Ibuprofen Hives     Past Medical History:   Diagnosis Date    ADHD (attention deficit hyperactivity disorder)     Anxiety     Constipation     Depression     GERD (gastroesophageal reflux disease)     IBS (irritable bowel syndrome)     Ovarian cyst     Schizophrenia in children      History reviewed. No pertinent surgical history.  Family History   Problem Relation Age of Onset    Asthma Mother     Diabetes Father      Social History     Tobacco Use    Smoking status: Passive Smoke Exposure - Never Smoker    Smokeless tobacco: Never Used   Substance Use Topics    Alcohol use: No    Drug use: No     Review of Systems   Constitutional: Negative for appetite change and fever.   Respiratory: Negative for cough and shortness of breath.    Cardiovascular: Negative for chest pain.   Gastrointestinal: Positive for abdominal pain and constipation. Negative for nausea and  vomiting.   Genitourinary: Negative for difficulty urinating, dysuria, frequency, hematuria and vaginal discharge.   Skin: Negative for rash.   All other systems reviewed and are negative.      Physical Exam     Initial Vitals [11/24/19 0159]   BP Pulse Resp Temp SpO2   137/81 92 18 98.2 °F (36.8 °C) 100 %      MAP       --         Physical Exam    Nursing note and vitals reviewed.  Constitutional: She appears well-developed and well-nourished. She is not diaphoretic. No distress.   HENT:   Head: Normocephalic and atraumatic.   Mouth/Throat: Oropharynx is clear and moist.   Eyes: Conjunctivae are normal. Pupils are equal, round, and reactive to light.   Neck: Normal range of motion. Neck supple.   Cardiovascular: Normal rate and intact distal pulses.   Pulmonary/Chest: No accessory muscle usage or stridor. No tachypnea. No respiratory distress.   Abdominal: Soft. Bowel sounds are normal. She exhibits no distension. There is no tenderness.   Musculoskeletal: Normal range of motion. She exhibits no tenderness.   Neurological: She is alert and oriented to person, place, and time. She has normal strength. Gait normal. GCS eye subscore is 4. GCS verbal subscore is 5. GCS motor subscore is 6.   Skin: Skin is warm. Capillary refill takes less than 2 seconds.   Psychiatric: She has a normal mood and affect.         ED Course   Procedures  Labs Reviewed   POCT URINE PREGNANCY   POCT URINALYSIS W/O SCOPE   POCT URINALYSIS W/O SCOPE          Imaging Results          CT Renal Stone Study ABD Pelvis WO (Final result)  Result time 11/24/19 03:01:27    Final result by Eddie Martinez MD (11/24/19 03:01:27)                 Impression:      No acute findings.      Electronically signed by: Eddie Martinez MD  Date:    11/24/2019  Time:    03:01             Narrative:    EXAMINATION:  CT RENAL STONE STUDY ABD PELVIS WO    CLINICAL HISTORY:  Abdominal pain, unspecified;left lower;    TECHNIQUE:  Low dose axial images, sagittal and  coronal reformations were obtained from the lung bases to the pubic symphysis.  Contrast was not administered.    COMPARISON:  October 6, 2009.    FINDINGS:  Heart: Normal in size. No pericardial effusion.    Lung Bases: Well aerated, without consolidation or pleural fluid.    Liver: Normal in size and attenuation, with no focal hepatic lesions.    Gallbladder: No calcified gallstones.    Bile Ducts: No evidence of dilated ducts.    Pancreas: No mass or peripancreatic fat stranding.    Spleen: Unremarkable.    Adrenals: Unremarkable.    Kidneys/ Ureters: No renal or ureteral calculi.  No hydroureteronephrosis.    Bladder: No evidence of wall thickening.    Reproductive organs: Unremarkable.    GI Tract/Mesentery: No evidence of bowel obstruction or inflammation.    Peritoneal Space: No ascites. No free air.    Retroperitoneum: No significant adenopathy.    Abdominal wall: Unremarkable.    Vasculature: No significant atherosclerosis or aneurysm.    Bones: No acute fracture.                                           Labs Reviewed  Admission on 11/24/2019, Discharged on 11/24/2019   Component Date Value Ref Range Status    POC Preg Test, Ur 11/24/2019 Negative  Negative Final     Acceptable 11/24/2019 Yes   Final    Glucose, UA 11/24/2019 Negative   Final    Bilirubin, UA 11/24/2019 Negative   Final    Ketones, UA 11/24/2019 Negative   Final    Spec Grav UA 11/24/2019 1.025   Final    Blood, UA 11/24/2019 Negative   Final    PH, UA 11/24/2019 7.0   Final    Protein, UA 11/24/2019 Negative   Final    Urobilinogen, UA 11/24/2019 1.0  E.U./dL Final    Nitrite, UA 11/24/2019 Negative   Final    Leukocytes, UA 11/24/2019 Negative   Final    Color, UA 11/24/2019 Yellow   Final    Clarity, UA 11/24/2019 Slightly Cloudy   Final        Imaging Reviewed    Imaging Results          CT Renal Stone Study ABD Pelvis WO (Final result)  Result time 11/24/19 03:01:27    Final result by Eddie Martinez MD  (11/24/19 03:01:27)                 Impression:      No acute findings.      Electronically signed by: Eddie Martinez MD  Date:    11/24/2019  Time:    03:01             Narrative:    EXAMINATION:  CT RENAL STONE STUDY ABD PELVIS WO    CLINICAL HISTORY:  Abdominal pain, unspecified;left lower;    TECHNIQUE:  Low dose axial images, sagittal and coronal reformations were obtained from the lung bases to the pubic symphysis.  Contrast was not administered.    COMPARISON:  October 6, 2009.    FINDINGS:  Heart: Normal in size. No pericardial effusion.    Lung Bases: Well aerated, without consolidation or pleural fluid.    Liver: Normal in size and attenuation, with no focal hepatic lesions.    Gallbladder: No calcified gallstones.    Bile Ducts: No evidence of dilated ducts.    Pancreas: No mass or peripancreatic fat stranding.    Spleen: Unremarkable.    Adrenals: Unremarkable.    Kidneys/ Ureters: No renal or ureteral calculi.  No hydroureteronephrosis.    Bladder: No evidence of wall thickening.    Reproductive organs: Unremarkable.    GI Tract/Mesentery: No evidence of bowel obstruction or inflammation.    Peritoneal Space: No ascites. No free air.    Retroperitoneum: No significant adenopathy.    Abdominal wall: Unremarkable.    Vasculature: No significant atherosclerosis or aneurysm.    Bones: No acute fracture.                                Medications given in ED    Medications - No data to display    Note was created using voice recognition software. Note may have occasional typographical errors that may not have been identified and edited despite good chito initial review prior to signing.                           Clinical Impression:       ICD-10-CM ICD-9-CM   1. Left lower quadrant abdominal pain R10.32 789.04         Disposition:   Disposition: Discharged  Condition: Stable                     Lobo Cordova MD  11/24/19 0326

## 2019-11-24 NOTE — ED NOTES
Pt requested something to drink in order to provide urine specimen. MD ok with giving pt a beverage. Jasen given

## 2020-07-29 ENCOUNTER — HOSPITAL ENCOUNTER (EMERGENCY)
Facility: HOSPITAL | Age: 20
Discharge: HOME | End: 2020-07-29
Attending: EMERGENCY MEDICINE
Payer: MEDICAID

## 2020-07-29 VITALS
TEMPERATURE: 98 F | SYSTOLIC BLOOD PRESSURE: 119 MMHG | HEIGHT: 63 IN | WEIGHT: 280 LBS | DIASTOLIC BLOOD PRESSURE: 75 MMHG | HEART RATE: 102 BPM | OXYGEN SATURATION: 98 % | BODY MASS INDEX: 49.61 KG/M2 | RESPIRATION RATE: 18 BRPM

## 2020-07-29 DIAGNOSIS — S39.012A BACK STRAIN, INITIAL ENCOUNTER: Primary | ICD-10-CM

## 2020-07-29 LAB
B-HCG UR QL: NEGATIVE
BILIRUBIN, POC UA: NEGATIVE
BLOOD, POC UA: NEGATIVE
CLARITY, POC UA: CLEAR
COLOR, POC UA: YELLOW
CTP QC/QA: YES
GLUCOSE, POC UA: NEGATIVE
KETONES, POC UA: NEGATIVE
LEUKOCYTE EST, POC UA: NEGATIVE
NITRITE, POC UA: NEGATIVE
PH UR STRIP: 5.5 [PH]
PROTEIN, POC UA: NEGATIVE
SPECIFIC GRAVITY, POC UA: >=1.03
UROBILINOGEN, POC UA: 0.2 E.U./DL

## 2020-07-29 PROCEDURE — 99283 EMERGENCY DEPT VISIT LOW MDM: CPT | Mod: 25,ER

## 2020-07-29 PROCEDURE — 81025 URINE PREGNANCY TEST: CPT | Mod: ER | Performed by: EMERGENCY MEDICINE

## 2020-07-29 PROCEDURE — 81003 URINALYSIS AUTO W/O SCOPE: CPT | Mod: ER

## 2020-07-29 RX ORDER — METHOCARBAMOL 500 MG/1
500 TABLET, FILM COATED ORAL 2 TIMES DAILY PRN
Qty: 15 TABLET | Refills: 0 | Status: SHIPPED | OUTPATIENT
Start: 2020-07-29 | End: 2020-08-03

## 2020-07-29 NOTE — ED PROVIDER NOTES
Encounter Date: 7/29/2020       History     Chief Complaint   Patient presents with    Flank Pain     PT C/O LEFT FLANK PAIN SINCE YESTERDAY, PT REPORTS PAIN IS WORSE WITH MOVEMENT, PT AMB TO ED RESTROOM WALKING BRIISKLY WITHOUT ANY S/S OF DISTRESS     This patient presents to the emergency department feeling like she has pulled a muscle in her back.  She is also were about the possibility of urinary tract infection as she has pain in her left lower back.  Denies any fever or any other complaints of.    The history is provided by the patient.     Review of patient's allergies indicates:   Allergen Reactions    Ibuprofen Hives     Past Medical History:   Diagnosis Date    ADHD (attention deficit hyperactivity disorder)     Anxiety     Constipation     Depression     GERD (gastroesophageal reflux disease)     IBS (irritable bowel syndrome)     Ovarian cyst     Schizophrenia in children      History reviewed. No pertinent surgical history.  Family History   Problem Relation Age of Onset    Asthma Mother     Diabetes Father      Social History     Tobacco Use    Smoking status: Passive Smoke Exposure - Never Smoker    Smokeless tobacco: Never Used   Substance Use Topics    Alcohol use: No    Drug use: No     Review of Systems   Constitutional: Negative.    HENT: Negative.    Eyes: Negative.    Respiratory: Negative.    Cardiovascular: Negative.    Gastrointestinal: Negative.    Endocrine: Negative.    Genitourinary: Negative.    Musculoskeletal: Positive for back pain.   Skin: Negative.    Allergic/Immunologic: Negative.    Neurological: Negative.    Hematological: Negative.    Psychiatric/Behavioral: Negative.    All other systems reviewed and are negative.      Physical Exam     Initial Vitals [07/29/20 0231]   BP Pulse Resp Temp SpO2   115/78 108 20 98.2 °F (36.8 °C) 98 %      MAP       --         Physical Exam    Nursing note and vitals reviewed.  Constitutional: Vital signs are normal. She is Obese  . She is active and cooperative.   HENT:   Head: Normocephalic and atraumatic.   Eyes: Conjunctivae, EOM and lids are normal. Pupils are equal, round, and reactive to light.   Neck: Trachea normal and full passive range of motion without pain. Neck supple. No thyroid mass present.   Cardiovascular: Regular rhythm, S1 normal, S2 normal, normal heart sounds, intact distal pulses and normal pulses. Tachycardia present.    Pulmonary/Chest: Effort normal and breath sounds normal.   Abdominal: Soft. Normal appearance, normal aorta and bowel sounds are normal.   Musculoskeletal: Normal range of motion.      Lumbar back: She exhibits pain and spasm.        Back:    Lymphadenopathy:     She has no axillary adenopathy.   Neurological: She is alert and oriented to person, place, and time. She has normal strength. No cranial nerve deficit or sensory deficit. GCS eye subscore is 4. GCS verbal subscore is 5. GCS motor subscore is 6.   Skin: Skin is warm, dry and intact.   Psychiatric: She has a normal mood and affect. Her speech is normal and behavior is normal. Judgment and thought content normal. Cognition and memory are normal.         ED Course   Procedures  Labs Reviewed   POCT URINALYSIS W/O SCOPE - Abnormal; Notable for the following components:       Result Value    Spec Grav UA >=1.030 (*)     All other components within normal limits   POCT URINE PREGNANCY   POCT URINALYSIS W/O SCOPE          Imaging Results    None                                          Clinical Impression:       ICD-10-CM ICD-9-CM   1. Back strain, initial encounter  S39.012A 847.9             ED Disposition Condition    Discharge Stable        ED Prescriptions     Medication Sig Dispense Start Date End Date Auth. Provider    methocarbamoL (ROBAXIN) 500 MG Tab Take 1 tablet (500 mg total) by mouth 2 (two) times daily as needed. 15 tablet 7/29/2020 8/3/2020 Rock Howard MD        Follow-up Information     Follow up With Specialties Details  Why Contact Info    Deacon Aguiar MD Family Medicine   [address] [facility] [address]  [phone]                                       Rock Howard MD  07/29/20 0657

## 2020-09-07 ENCOUNTER — HOSPITAL ENCOUNTER (EMERGENCY)
Facility: HOSPITAL | Age: 20
Discharge: HOME | End: 2020-09-07
Attending: EMERGENCY MEDICINE
Payer: MEDICAID

## 2020-09-07 VITALS
HEIGHT: 63 IN | DIASTOLIC BLOOD PRESSURE: 87 MMHG | BODY MASS INDEX: 49.61 KG/M2 | OXYGEN SATURATION: 99 % | WEIGHT: 280 LBS | TEMPERATURE: 98 F | HEART RATE: 98 BPM | SYSTOLIC BLOOD PRESSURE: 168 MMHG | RESPIRATION RATE: 20 BRPM

## 2020-09-07 DIAGNOSIS — R10.31 RIGHT LOWER QUADRANT ABDOMINAL PAIN: Primary | ICD-10-CM

## 2020-09-07 LAB
ALBUMIN SERPL-MCNC: 3.8 G/DL (ref 3.3–5.5)
ALP SERPL-CCNC: 72 U/L (ref 42–141)
B-HCG UR QL: NEGATIVE
BILIRUB SERPL-MCNC: 0.4 MG/DL (ref 0.2–1.6)
BILIRUBIN, POC UA: NEGATIVE
BLOOD, POC UA: NEGATIVE
BUN SERPL-MCNC: 9 MG/DL (ref 7–22)
CALCIUM SERPL-MCNC: 9.7 MG/DL (ref 8–10.3)
CHLORIDE SERPL-SCNC: 107 MMOL/L (ref 98–108)
CLARITY, POC UA: CLEAR
COLOR, POC UA: YELLOW
CREAT SERPL-MCNC: 0.9 MG/DL (ref 0.6–1.2)
CTP QC/QA: YES
GLUCOSE SERPL-MCNC: 91 MG/DL (ref 73–118)
GLUCOSE, POC UA: NEGATIVE
KETONES, POC UA: NEGATIVE
LEUKOCYTE EST, POC UA: NEGATIVE
NITRITE, POC UA: NEGATIVE
PH UR STRIP: 5.5 [PH]
POC ALT (SGPT): 29 U/L (ref 10–47)
POC AST (SGOT): 31 U/L (ref 11–38)
POC TCO2: 28 MMOL/L (ref 18–33)
POTASSIUM BLD-SCNC: 3.8 MMOL/L (ref 3.6–5.1)
PROTEIN, POC UA: NEGATIVE
PROTEIN, POC: 7.2 G/DL (ref 6.4–8.1)
SODIUM BLD-SCNC: 146 MMOL/L (ref 128–145)
SPECIFIC GRAVITY, POC UA: >=1.03
UROBILINOGEN, POC UA: 0.2 E.U./DL

## 2020-09-07 PROCEDURE — 87491 CHLMYD TRACH DNA AMP PROBE: CPT

## 2020-09-07 PROCEDURE — 81025 URINE PREGNANCY TEST: CPT | Mod: ER | Performed by: EMERGENCY MEDICINE

## 2020-09-07 PROCEDURE — 99285 EMERGENCY DEPT VISIT HI MDM: CPT | Mod: 25,ER

## 2020-09-07 PROCEDURE — 85025 COMPLETE CBC W/AUTO DIFF WBC: CPT | Mod: ER

## 2020-09-07 PROCEDURE — 80053 COMPREHEN METABOLIC PANEL: CPT | Mod: ER

## 2020-09-07 PROCEDURE — 25500020 PHARM REV CODE 255: Mod: ER | Performed by: EMERGENCY MEDICINE

## 2020-09-07 PROCEDURE — 87801 DETECT AGNT MULT DNA AMPLI: CPT

## 2020-09-07 PROCEDURE — 87481 CANDIDA DNA AMP PROBE: CPT | Mod: 59

## 2020-09-07 PROCEDURE — 81003 URINALYSIS AUTO W/O SCOPE: CPT | Mod: ER

## 2020-09-07 RX ORDER — DOCUSATE SODIUM 100 MG/1
100 CAPSULE, LIQUID FILLED ORAL 2 TIMES DAILY
Qty: 60 CAPSULE | Refills: 0 | Status: SHIPPED | OUTPATIENT
Start: 2020-09-07 | End: 2022-01-20

## 2020-09-07 RX ORDER — ONDANSETRON 4 MG/1
4 TABLET, FILM COATED ORAL EVERY 6 HOURS
Qty: 12 TABLET | Refills: 0 | Status: SHIPPED | OUTPATIENT
Start: 2020-09-07 | End: 2022-01-20

## 2020-09-07 RX ORDER — ACETAMINOPHEN 325 MG/1
650 TABLET ORAL EVERY 6 HOURS PRN
Qty: 13 TABLET | Refills: 0 | Status: SHIPPED | OUTPATIENT
Start: 2020-09-07 | End: 2022-01-20

## 2020-09-07 RX ADMIN — IOHEXOL 100 ML: 350 INJECTION, SOLUTION INTRAVENOUS at 09:09

## 2020-09-08 NOTE — ED TRIAGE NOTES
Pt presents to the ER with c/o abd cramping and loose stools. Pt reports nausea without emesis. Pt denies fever or chills.

## 2020-09-08 NOTE — ED PROVIDER NOTES
Encounter Date: 9/7/2020    SCRIBE #1 NOTE: I, Selene Arnett, am scribing for, and in the presence of,  Dr. Sinha. I have scribed the following portions of the note - Other sections scribed: HPI, ROS, PE.       History     Chief Complaint   Patient presents with    Abdominal Pain     RLQ ABD PAIN WITH NAUSEA AND CONSTIPATION X 4 DAYS; DENIES DIARRHEA, URINARY SYMPTOMS, VAGINAL DISCHARGE AND FEVER     Felicity Cohen is a 20 y.o. female with acid reflux and IBS who presents to the ED complaining of worsening nausea x 3 days with RLQ pain. Endorses constipation. States the nausea is worse with eating. Reports taking laxatives for her constipation with relief. States mother was concerned about appendicitis. Patient is allergic to ibuprofen. Denies any surgeries or taking daily medications. Denies smoking or street drug use. Endorses occasional drinking. Denies recent travel. No sick contacts. Denies dysuria, frequency, vaginal bleeding and vaginal discharge. [facility] was 08/24/20.    The history is provided by the patient. No  was used.     Review of patient's allergies indicates:   Allergen Reactions    Ibuprofen Hives     Past Medical History:   Diagnosis Date    ADHD (attention deficit hyperactivity disorder)     Anxiety     Constipation     Depression     GERD (gastroesophageal reflux disease)     IBS (irritable bowel syndrome)     Ovarian cyst     Schizophrenia in children      History reviewed. No pertinent surgical history.  Family History   Problem Relation Age of Onset    Asthma Mother     Diabetes Father      Social History     Tobacco Use    Smoking status: Passive Smoke Exposure - Never Smoker    Smokeless tobacco: Never Used   Substance Use Topics    Alcohol use: No    Drug use: No     Review of Systems   Constitutional: Negative for fever.   Gastrointestinal: Positive for abdominal pain, constipation and nausea.   Genitourinary: Negative for dysuria, frequency, vaginal  bleeding and vaginal discharge.   All other systems reviewed and are negative.      Physical Exam     Initial Vitals [09/07/20 1817]   BP Pulse Resp Temp SpO2   128/81 93 18 98.3 °F (36.8 °C) 99 %      MAP       --         Physical Exam    Nursing note and vitals reviewed.  Constitutional: She appears well-developed and well-nourished. No distress.   HENT:   Head: Normocephalic and atraumatic.   Right Ear: External ear normal.   Left Ear: External ear normal.   Eyes: Conjunctivae are normal.   Neck: Normal range of motion. Neck supple.   Cardiovascular: Normal rate and regular rhythm.   Pulmonary/Chest: Effort normal. No respiratory distress.   Abdominal: Soft. Normal appearance. There is abdominal tenderness in the right lower quadrant. There is no rebound, no guarding and no CVA tenderness.   No RUQ tenderness   Genitourinary:    Vagina normal.      No vaginal discharge.      Genitourinary Comments: No cervical motion tenderness or adnexal masses or adnexal tenderness     Musculoskeletal: Normal range of motion.   Neurological: She is alert and oriented to person, place, and time.   Skin: Skin is warm and dry. No rash noted.   Psychiatric: She has a normal mood and affect. Her behavior is normal.         ED Course   Procedures  Labs Reviewed   POCT URINALYSIS W/O SCOPE - Abnormal; Notable for the following components:       Result Value    Spec Grav UA >=1.030 (*)     All other components within normal limits   POCT CMP - Abnormal; Notable for the following components:    POC Sodium 146 (*)     All other components within normal limits   C. TRACHOMATIS/N. GONORRHOEAE BY AMP DNA   VAGINOSIS SCREEN BY DNA PROBE   POCT URINE PREGNANCY   POCT URINALYSIS W/O SCOPE   POCT CBC   POCT CMP          Imaging Results          CT Abdomen Pelvis With Contrast (Final result)  Result time 09/07/20 21:19:47    Final result by Kiya Peterson MD (09/07/20 21:19:47)                 Impression:      No acute abdominal or pelvic  pathology CT of the abdomen and pelvis with contrast.      Electronically signed by: Kiya Peterson  Date:    09/07/2020  Time:    21:19             Narrative:    EXAMINATION:  CT OF ABDOMEN PELVIS WITH    CLINICAL HISTORY:  RLQ abdominal pain, appendicitis suspected (Age => 12y);    TECHNIQUE:  5 mm enhanced axial images were obtained from the lung bases through the greater trochanters.  One hundred mL of Omnipaque 350 was injected.    COMPARISON:  11/24/2019    FINDINGS:  The liver, spleen, pancreas, kidneys, and adrenal glands are unremarkable. The gallbladder contains no calcified gallstones.    There is no definite evidence for abdominal adenopathy or ascites.    There are no pelvic masses or adenopathy.    There is no free fluid in the pelvis.  A metallic disc [place] with streak artifact is seen at the level of the labia and may represent clitoris jewelry.  Small fluid is seen in the endometrial canal.  The presumed appendix is not inflamed.    There is minimal bibasilar atelectasis.                                 Medical Decision Making:   History:   Old Medical Records: I decided to obtain old medical records.  Initial Assessment:   20 year old patient presenting 2/2 abdominal pain in the right lower quadrant with us on differential being in a ovarian cyst versus appendicitis versus constipation.    Also considered but less likely:     AAA: location inconsistent, no bruits, no history of HTN  Cholecystitis: location inconsistent, no relation with meals, negative pinedos  SBO: normal BM and flatus. No vomiting  Mesenteric ischemia: HPI inconsistent, does not coincide with meals, other dx more likely  Kidney stone: no radiation to back or cva tenderness, no dysuria, no hematuria  Pyelonephritis: no cva tenderness, no dysuria, no fever  Pancreatitis: no history of alcohol abuse, unlikely gallstone obstructing, location inconsistent  Diverticulitis: age and location not most common, no history of  diverticulitis, no fever, no wbc  TOA: no systemic symptoms, location inconsistent, pelvic exam benign  Ectopic: negative pregnancy test  Torsion: no adnexal tenderness and hpi not consistent  PID: no history of STDs, no vaginal discharge, no cervical motion tenderness    Labs and imaging reassuring.  Repeat exam reassuring.  OBGYN primary care follow-up in the patient.    Patient discharged home with Tylenol in [place] and Zofran as needed. Return precautions given, patient understands and agrees with plan. All questions answered.  Instructed to follow up with PCP. I discussed with the patient/family the diagnosis, treatment plan, indications for return to the emergency department, and for expected follow-up. The patient/family verbalized an understanding. The patient/family is asked if there are any questions or concerns. We discuss the case, until all issues are addressed to the patient/familys satisfaction. Patient/family understands and is agreeable to the plan.   Iron Sinha        Clinical Tests:   Lab Tests: Ordered and Reviewed            Scribe Attestation:   Scribe #1: I performed the above scribed service and the documentation accurately describes the services I performed. I attest to the accuracy of the note.    I, Iron Sinha, personally performed the services described in this documentation. All medical record entries made by the scribe were at my direction and in my presence. I have reviewed the chart and agree that the record reflects my personal performance and is accurate and complete.                        Clinical Impression:     1. Right lower quadrant abdominal pain                ED Disposition Condition    Discharge Stable        ED Prescriptions     Medication Sig Dispense Start Date End Date Auth. Provider    acetaminophen (TYLENOL) 325 MG tablet Take 2 tablets (650 mg total) by mouth every 6 (six) hours as needed. 13 tablet 9/7/2020  Iron Sinha MD    ondansetron (ZOFRAN) 4 MG  tablet Take 1 tablet (4 mg total) by mouth every 6 (six) hours. 12 tablet 9/7/2020  Iron Sinha MD    docusate sodium (COLACE) 100 MG capsule Take 1 capsule (100 mg total) by mouth 2 (two) times daily. 60 capsule 9/7/2020  Iron Sinha MD        Follow-up Information     Follow up With Specialties Details Why Contact Info    Deacon Aguiar MD Family Medicine Schedule an appointment as soon as possible for a visit in 2 days  [address] [facility] [address] [phone]      [facility] Obstetrics and Gynecology Schedule an appointment as soon as possible for a visit in 2 days  [address] [facility] [address] [phone]                                       Iron Sinha MD  09/07/20 2689

## 2020-09-10 LAB
CANDIDA RRNA VAG QL PROBE: NEGATIVE
G VAGINALIS RRNA GENITAL QL PROBE: NEGATIVE
T VAGINALIS RRNA GENITAL QL PROBE: NEGATIVE

## 2020-10-05 LAB
C TRACH DNA SPEC QL NAA+PROBE: NOT DETECTED
N GONORRHOEA DNA SPEC QL NAA+PROBE: NOT DETECTED

## 2020-11-20 ENCOUNTER — HOSPITAL ENCOUNTER (EMERGENCY)
Facility: HOSPITAL | Age: 20
Discharge: HOME | End: 2020-11-20
Attending: EMERGENCY MEDICINE
Payer: MEDICAID

## 2020-11-20 VITALS
RESPIRATION RATE: 18 BRPM | WEIGHT: 280 LBS | OXYGEN SATURATION: 100 % | TEMPERATURE: 99 F | BODY MASS INDEX: 49.61 KG/M2 | HEART RATE: 86 BPM | DIASTOLIC BLOOD PRESSURE: 78 MMHG | SYSTOLIC BLOOD PRESSURE: 124 MMHG | HEIGHT: 63 IN

## 2020-11-20 DIAGNOSIS — R20.2 PARESTHESIA: ICD-10-CM

## 2020-11-20 DIAGNOSIS — M54.50 ACUTE MIDLINE LOW BACK PAIN WITHOUT SCIATICA: ICD-10-CM

## 2020-11-20 DIAGNOSIS — M54.2 NECK PAIN: Primary | ICD-10-CM

## 2020-11-20 LAB
B-HCG UR QL: NEGATIVE
BILIRUB UR QL STRIP: NEGATIVE
CLARITY UR: CLEAR
COLOR UR: YELLOW
CTP QC/QA: YES
GLUCOSE UR QL STRIP: NEGATIVE
HGB UR QL STRIP: NEGATIVE
KETONES UR QL STRIP: NEGATIVE
LEUKOCYTE ESTERASE UR QL STRIP: NEGATIVE
NITRITE UR QL STRIP: NEGATIVE
PH UR STRIP: 6 [PH] (ref 5–8)
PROT UR QL STRIP: NEGATIVE
SP GR UR STRIP: 1.02 (ref 1–1.03)
URN SPEC COLLECT METH UR: NORMAL
UROBILINOGEN UR STRIP-ACNC: NEGATIVE EU/DL

## 2020-11-20 PROCEDURE — 99283 EMERGENCY DEPT VISIT LOW MDM: CPT

## 2020-11-20 PROCEDURE — 25000003 PHARM REV CODE 250: Performed by: PHYSICIAN ASSISTANT

## 2020-11-20 PROCEDURE — 81025 URINE PREGNANCY TEST: CPT | Performed by: EMERGENCY MEDICINE

## 2020-11-20 PROCEDURE — 81003 URINALYSIS AUTO W/O SCOPE: CPT

## 2020-11-20 RX ORDER — METHOCARBAMOL 500 MG/1
1000 TABLET, FILM COATED ORAL
Status: COMPLETED | OUTPATIENT
Start: 2020-11-20 | End: 2020-11-20

## 2020-11-20 RX ORDER — METHOCARBAMOL 500 MG/1
1000 TABLET, FILM COATED ORAL 3 TIMES DAILY PRN
Qty: 20 TABLET | Refills: 0 | Status: SHIPPED | OUTPATIENT
Start: 2020-11-20 | End: 2020-11-25

## 2020-11-20 RX ADMIN — METHOCARBAMOL 1000 MG: 500 TABLET ORAL at 10:11

## 2020-11-20 NOTE — Clinical Note
"Felicity Man" Noel was seen and treated in our emergency department on 11/20/2020.  She may return to work on 11/21/2020.       If you have any questions or concerns, please don't hesitate to call.      Sherri Nelson PA-C"

## 2020-11-21 NOTE — ED TRIAGE NOTES
Patient reports neck pain with associated tingling to bilateral arms x 8 days. Also reports lower back pain. Reports taking Excedrin for the pain with minimal relief, last taken 2 days ago. Describes back pain as sharp, intermittent, worse with laying down. Denies dysuria, hematuria, n/v, loss of bowel or bladder, fever.

## 2020-11-21 NOTE — DISCHARGE INSTRUCTIONS
Continue with Tylenol or Ibuprofen as needed for pain. Robaxin for stiffness/soreness. Be aware, this medication is sedating.  Do not mix with alcohol or any other sedating medications.  Do not drive or operate machinery when taking this medication.     Follow-up with primary care provider for re-evaluation.    Follow-up with Neurology for re-evaluation of headaches, for re-evaluation of new pins and needles sensation.

## 2020-11-21 NOTE — ED PROVIDER NOTES
Encounter Date: 11/20/2020       History     Chief Complaint   Patient presents with    Neck Pain     w/ tingling to both arms and back pain x 8 days     20-year-old female presents to ED complaining of atraumatic thoracic back pain, lumbar back pain.  No urinary complaints.  No flank pain.  No chest pain.  No shortness of breath.  No fever.  He states recently when she eats she notices pins and needle sensation to her in her upper arms, which spontaneously resolves.  Denies weakness or arms.  No associated headache.  No history of TIA or CVA.  No lower extremity complaints.        Review of patient's allergies indicates:   Allergen Reactions    Ibuprofen Hives     Past Medical History:   Diagnosis Date    ADHD (attention deficit hyperactivity disorder)     Anxiety     Constipation     Depression     GERD (gastroesophageal reflux disease)     IBS (irritable bowel syndrome)     Ovarian cyst     Schizophrenia in children      History reviewed. No pertinent surgical history.  Family History   Problem Relation Age of Onset    Asthma Mother     Diabetes Father      Social History     Tobacco Use    Smoking status: Passive Smoke Exposure - Never Smoker    Smokeless tobacco: Never Used   Substance Use Topics    Alcohol use: No    Drug use: Never     Review of Systems   Constitutional: Negative for fever.   Respiratory: Negative for shortness of breath.    Cardiovascular: Negative for chest pain.   Gastrointestinal: Negative for abdominal pain, nausea and vomiting.   Genitourinary: Negative for dysuria, flank pain and frequency.   Musculoskeletal: Positive for back pain and neck pain. Negative for myalgias and neck stiffness.   Skin: Negative for rash.   Neurological: Negative for weakness, light-headedness and headaches.        Paresthesia       Physical Exam     Initial Vitals [11/20/20 2033]   BP Pulse Resp Temp SpO2   136/87 97 18 98.8 °F (37.1 °C) 99 %      MAP       --         Physical Exam    Nursing  note and vitals reviewed.  Constitutional: She appears well-developed and well-nourished. She is not diaphoretic. No distress.   Well-appearing nontoxic.  Resting upright on exam table.   HENT:   Head: Normocephalic and atraumatic.   Eyes: EOM are normal.   Neck: Normal range of motion. Neck supple.   Pulmonary/Chest: No respiratory distress.   Musculoskeletal: Normal range of motion.      Comments: TTP thoracic paraspinal musculature, lumbar paraspinal musculature.  No midline spinal tenderness.   Neurological: She is alert and oriented to person, place, and time. GCS score is 15. GCS eye subscore is 4. GCS verbal subscore is 5. GCS motor subscore is 6.   No focal neurologic deficit   Skin: Skin is warm. Capillary refill takes less than 2 seconds.   Psychiatric: Thought content normal.   Anxious         ED Course   Procedures  Labs Reviewed   URINALYSIS, REFLEX TO URINE CULTURE    Narrative:     Specimen Source->Urine   POCT URINE PREGNANCY          Imaging Results    None          Medical Decision Making:   Initial Assessment:   18-year-old female with neck and back pain  Differential Diagnosis:   Chronic pain, anxiety, cervical strain, lumbar strain, UTI, cervical radiculopathy, TIA  Clinical Tests:   Lab Tests: Ordered and Reviewed  ED Management:  Low suspicion for emergent process.  Symptoms are chronic.  Vitals reassuring.  No focal neurologic deficit.  Complaints not consistent with CVA.  Supportive measures, PCP follow up, ED return precautions given.                             Clinical Impression:       ICD-10-CM ICD-9-CM   1. Neck pain  M54.2 723.1   2. Acute midline low back pain without sciatica  M54.5 724.2   3. Paresthesia  R20.2 782.0                      Disposition:   Disposition: Discharged  Condition: Stable     ED Disposition Condition    Discharge Stable        ED Prescriptions     Medication Sig Dispense Start Date End Date Auth. Provider    methocarbamoL (ROBAXIN) 500 MG Tab Take 2 tablets  (1,000 mg total) by mouth 3 (three) times daily as needed (Muscle stiffness/soreness). 20 tablet 11/20/2020 11/25/2020 Juice Trejo PA-C        Follow-up Information     Follow up With Specialties Details Why Contact Info    Deacon Aguiar MD Family Medicine Schedule an appointment as soon as possible for a visit  For reevaluation, If symptoms persist [address] [facility] [id number] [phone]      [facility] - Neurology/Ms/Stroke Neurology Schedule an appointment as soon as possible for a visit  For reevaluation [address] [facility] [address]  [phone]      [facility] - Neurology Neurology  For reevaluation [address] [facility] [address]  [phone]                                         Juice Trejo PA-C  11/21/20 0252

## 2021-09-11 ENCOUNTER — HOSPITAL ENCOUNTER (EMERGENCY)
Facility: HOSPITAL | Age: 21
Discharge: HOME | End: 2021-09-11
Attending: EMERGENCY MEDICINE
Payer: MEDICAID

## 2021-09-11 VITALS
HEART RATE: 104 BPM | WEIGHT: 290 LBS | DIASTOLIC BLOOD PRESSURE: 94 MMHG | BODY MASS INDEX: 51.38 KG/M2 | RESPIRATION RATE: 20 BRPM | SYSTOLIC BLOOD PRESSURE: 140 MMHG | TEMPERATURE: 98 F | HEIGHT: 63 IN | OXYGEN SATURATION: 100 %

## 2021-09-11 DIAGNOSIS — J40 BRONCHITIS: Primary | ICD-10-CM

## 2021-09-11 LAB
CTP QC/QA: YES
SARS-COV-2 RDRP RESP QL NAA+PROBE: NEGATIVE

## 2021-09-11 PROCEDURE — U0002 COVID-19 LAB TEST NON-CDC: HCPCS | Mod: ER | Performed by: EMERGENCY MEDICINE

## 2021-09-11 PROCEDURE — 99284 EMERGENCY DEPT VISIT MOD MDM: CPT | Mod: 25,ER

## 2021-09-11 RX ORDER — PREDNISONE 10 MG/1
10 TABLET ORAL DAILY
Qty: 5 TABLET | Refills: 0 | Status: SHIPPED | OUTPATIENT
Start: 2021-09-11 | End: 2021-09-16

## 2021-09-11 RX ORDER — ALBUTEROL SULFATE 90 UG/1
2 AEROSOL, METERED RESPIRATORY (INHALATION) EVERY 6 HOURS PRN
Qty: 6.7 G | Refills: 0 | Status: SHIPPED | OUTPATIENT
Start: 2021-09-11

## 2022-01-20 ENCOUNTER — OFFICE VISIT (OUTPATIENT)
Dept: GASTROENTEROLOGY | Facility: CLINIC | Age: 22
End: 2022-01-20
Payer: MEDICAID

## 2022-01-20 ENCOUNTER — TELEPHONE (OUTPATIENT)
Dept: GASTROENTEROLOGY | Facility: CLINIC | Age: 22
End: 2022-01-20

## 2022-01-20 VITALS
HEART RATE: 88 BPM | BODY MASS INDEX: 51.91 KG/M2 | OXYGEN SATURATION: 97 % | HEIGHT: 63 IN | SYSTOLIC BLOOD PRESSURE: 118 MMHG | DIASTOLIC BLOOD PRESSURE: 78 MMHG | WEIGHT: 293 LBS

## 2022-01-20 DIAGNOSIS — K21.9 GASTROESOPHAGEAL REFLUX DISEASE, UNSPECIFIED WHETHER ESOPHAGITIS PRESENT: ICD-10-CM

## 2022-01-20 DIAGNOSIS — K59.04 CHRONIC IDIOPATHIC CONSTIPATION: ICD-10-CM

## 2022-01-20 DIAGNOSIS — R10.13 EPIGASTRIC PAIN: Primary | ICD-10-CM

## 2022-01-20 DIAGNOSIS — E66.01 CLASS 3 SEVERE OBESITY DUE TO EXCESS CALORIES WITH BODY MASS INDEX (BMI) OF 50.0 TO 59.9 IN ADULT, UNSPECIFIED WHETHER SERIOUS COMORBIDITY PRESENT: ICD-10-CM

## 2022-01-20 PROBLEM — E66.813 CLASS 3 SEVERE OBESITY DUE TO EXCESS CALORIES WITH BODY MASS INDEX (BMI) OF 50.0 TO 59.9 IN ADULT: Status: ACTIVE | Noted: 2022-01-20

## 2022-01-20 PROCEDURE — 3078F DIAST BP <80 MM HG: CPT | Mod: CPTII,,, | Performed by: NURSE PRACTITIONER

## 2022-01-20 PROCEDURE — 99999 PR PBB SHADOW E&M-EST. PATIENT-LVL IV: CPT | Mod: PBBFAC,,, | Performed by: NURSE PRACTITIONER

## 2022-01-20 PROCEDURE — 3008F PR BODY MASS INDEX (BMI) DOCUMENTED: ICD-10-PCS | Mod: CPTII,,, | Performed by: NURSE PRACTITIONER

## 2022-01-20 PROCEDURE — 1160F PR REVIEW ALL MEDS BY PRESCRIBER/CLIN PHARMACIST DOCUMENTED: ICD-10-PCS | Mod: CPTII,,, | Performed by: NURSE PRACTITIONER

## 2022-01-20 PROCEDURE — 99999 PR PBB SHADOW E&M-EST. PATIENT-LVL IV: ICD-10-PCS | Mod: PBBFAC,,, | Performed by: NURSE PRACTITIONER

## 2022-01-20 PROCEDURE — 3074F SYST BP LT 130 MM HG: CPT | Mod: CPTII,,, | Performed by: NURSE PRACTITIONER

## 2022-01-20 PROCEDURE — 1160F RVW MEDS BY RX/DR IN RCRD: CPT | Mod: CPTII,,, | Performed by: NURSE PRACTITIONER

## 2022-01-20 PROCEDURE — 3078F PR MOST RECENT DIASTOLIC BLOOD PRESSURE < 80 MM HG: ICD-10-PCS | Mod: CPTII,,, | Performed by: NURSE PRACTITIONER

## 2022-01-20 PROCEDURE — 99214 OFFICE O/P EST MOD 30 MIN: CPT | Mod: PBBFAC,PO | Performed by: NURSE PRACTITIONER

## 2022-01-20 PROCEDURE — 1159F MED LIST DOCD IN RCRD: CPT | Mod: CPTII,,, | Performed by: NURSE PRACTITIONER

## 2022-01-20 PROCEDURE — 3074F PR MOST RECENT SYSTOLIC BLOOD PRESSURE < 130 MM HG: ICD-10-PCS | Mod: CPTII,,, | Performed by: NURSE PRACTITIONER

## 2022-01-20 PROCEDURE — 99203 OFFICE O/P NEW LOW 30 MIN: CPT | Mod: S$PBB,,, | Performed by: NURSE PRACTITIONER

## 2022-01-20 PROCEDURE — 3008F BODY MASS INDEX DOCD: CPT | Mod: CPTII,,, | Performed by: NURSE PRACTITIONER

## 2022-01-20 PROCEDURE — 1159F PR MEDICATION LIST DOCUMENTED IN MEDICAL RECORD: ICD-10-PCS | Mod: CPTII,,, | Performed by: NURSE PRACTITIONER

## 2022-01-20 PROCEDURE — 99203 PR OFFICE/OUTPT VISIT, NEW, LEVL III, 30-44 MIN: ICD-10-PCS | Mod: S$PBB,,, | Performed by: NURSE PRACTITIONER

## 2022-01-20 RX ORDER — SERTRALINE HYDROCHLORIDE 100 MG/1
TABLET, FILM COATED ORAL
COMMUNITY
Start: 2021-08-02 | End: 2022-01-20

## 2022-01-20 RX ORDER — OMEPRAZOLE 40 MG/1
40 CAPSULE, DELAYED RELEASE ORAL DAILY
Qty: 30 CAPSULE | Refills: 2 | Status: SHIPPED | OUTPATIENT
Start: 2022-01-20 | End: 2022-05-03

## 2022-01-20 RX ORDER — DEXTROAMPHETAMINE SACCHARATE, AMPHETAMINE ASPARTATE, DEXTROAMPHETAMINE SULFATE AND AMPHETAMINE SULFATE 5; 5; 5; 5 MG/1; MG/1; MG/1; MG/1
1 TABLET ORAL DAILY
COMMUNITY
Start: 2021-08-03 | End: 2022-01-20

## 2022-01-20 RX ORDER — POLYETHYLENE GLYCOL 3350 17 G/17G
17 POWDER, FOR SOLUTION ORAL DAILY
Qty: 510 G | Refills: 11 | Status: SHIPPED | OUTPATIENT
Start: 2022-01-20

## 2022-01-20 NOTE — PROGRESS NOTES
Subjective:       Patient ID: Felicity Cohen is a 21 y.o. female.    Chief Complaint: Heartburn and Abdominal Pain    22 y/o female referred by PCP for GERD and abdominal pain.     Gastroesophageal Reflux  She complains of abdominal pain, belching, globus sensation, heartburn, nausea and water brash. She reports no chest pain, no choking, no coughing, no dysphagia or no early satiety. (+) constipation. This is a recurrent problem. The current episode started more than 1 month ago. The problem occurs frequently. The problem has been waxing and waning. The heartburn duration is several minutes. The heartburn is located in the substernum. The heartburn is of moderate intensity. The heartburn does not wake her from sleep. The heartburn does not limit her activity. The heartburn doesn't change with position. The symptoms are aggravated by certain foods. Pertinent negatives include no anemia, fatigue, melena, orthopnea or weight loss. Risk factors include obesity. She has tried nothing for the symptoms. Past procedures do not include an EGD.       Past Medical History:   Diagnosis Date    ADHD (attention deficit hyperactivity disorder)     Anxiety     Constipation     Depression     GERD (gastroesophageal reflux disease)     IBS (irritable bowel syndrome)     Ovarian cyst     Schizophrenia in children        History reviewed. No pertinent surgical history.    Family History   Problem Relation Age of Onset    Asthma Mother     Diabetes Father        Social History     Socioeconomic History    Marital status: Single   Tobacco Use    Smoking status: Passive Smoke Exposure - Never Smoker    Smokeless tobacco: Never Used   Substance and Sexual Activity    Alcohol use: No    Drug use: Never    Sexual activity: Yes     Partners: Male       Review of Systems   Constitutional: Negative for appetite change, fatigue, unexpected weight change and weight loss.   HENT: Negative for trouble swallowing.    Respiratory:  "Negative for cough and choking.    Cardiovascular: Negative for chest pain.   Gastrointestinal: Positive for abdominal pain, constipation, heartburn and nausea. Negative for blood in stool, dysphagia and melena.   Genitourinary: Negative for dysuria.   Neurological: Negative for dizziness and weakness.   Hematological: Negative for adenopathy. Does not bruise/bleed easily.   Psychiatric/Behavioral: Negative for dysphoric mood.         Objective:     Vitals:    01/20/22 1022   BP: 118/78   BP Location: Left arm   Patient Position: Sitting   BP Method: Large (Manual)   Pulse: 88   SpO2: 97%   Weight: (!) 141.2 kg (311 lb 4.6 oz)   Height: 5' 3" (1.6 m)          Physical Exam  Constitutional:       General: She is not in acute distress.     Appearance: Normal appearance. She is not ill-appearing.   HENT:      Head: Normocephalic.   Eyes:      Conjunctiva/sclera: Conjunctivae normal.      Pupils: Pupils are equal, round, and reactive to light.   Pulmonary:      Effort: Pulmonary effort is normal. No respiratory distress.   Musculoskeletal:         General: Normal range of motion.      Cervical back: Normal range of motion.   Skin:     General: Skin is warm and dry.      Coloration: Skin is not jaundiced or pale.   Neurological:      Mental Status: She is alert and oriented to person, place, and time.   Psychiatric:         Mood and Affect: Mood normal.         Behavior: Behavior normal.               Assessment:         ICD-10-CM ICD-9-CM   1. Epigastric pain  R10.13 789.06   2. Gastroesophageal reflux disease, unspecified whether esophagitis present  K21.9 530.81   3. Chronic idiopathic constipation  K59.04 564.00   4. Class 3 severe obesity due to excess calories with body mass index (BMI) of 50.0 to 59.9 in adult, unspecified whether serious comorbidity present  E66.01 278.01    Z68.43 V85.43       Plan:       Epigastric pain  -     X-Ray Abdomen Flat And Erect; Future; Expected date: 01/20/2022  -     Pregnancy, " urine rapid; Future  -     omeprazole (PRILOSEC) 40 MG capsule; Take 1 capsule (40 mg total) by mouth once daily.  Dispense: 30 capsule; Refill: 2  -     Case Request Endoscopy: EGD (ESOPHAGOGASTRODUODENOSCOPY)    Gastroesophageal reflux disease, unspecified whether esophagitis present  -     omeprazole (PRILOSEC) 40 MG capsule; Take 1 capsule (40 mg total) by mouth once daily.  Dispense: 30 capsule; Refill: 2  -     Case Request Endoscopy: EGD (ESOPHAGOGASTRODUODENOSCOPY)    Chronic idiopathic constipation  -     Recommend Miralax and fiber supplement daily  -     polyethylene glycol (GLYCOLAX) 17 gram/dose powder; Take 17 g by mouth once daily.  Dispense: 510 g; Refill: 11    Class 3 severe obesity due to excess calories with body mass index (BMI) of 50.0 to 59.9 in adult, unspecified whether serious comorbidity present        -      Weight loss advised. Dietary and exercise counseling done.      Follow up if symptoms worsen or fail to improve.     Patient's Medications   New Prescriptions    OMEPRAZOLE (PRILOSEC) 40 MG CAPSULE    Take 1 capsule (40 mg total) by mouth once daily.    POLYETHYLENE GLYCOL (GLYCOLAX) 17 GRAM/DOSE POWDER    Take 17 g by mouth once daily.   Previous Medications    ALBUTEROL (PROVENTIL/VENTOLIN HFA) 90 MCG/ACTUATION INHALER    Inhale 2 puffs into the lungs every 6 (six) hours as needed for Wheezing. Rescue    CLONAZEPAM (KLONOPIN) 0.5 MG TABLET    Take 1 tablet (0.5 mg total) by mouth every evening.    CYCLOBENZAPRINE (FLEXERIL) 10 MG TABLET    Take 1 tablet (10 mg total) by mouth every evening.    DICLOFENAC SODIUM (VOLTAREN) 1 % GEL    Apply 2 g topically 4 (four) times daily. for 10 days    ERGOCALCIFEROL (ERGOCALCIFEROL) 50,000 UNIT CAP    Take 1 capsule (50,000 Units total) by mouth every 7 days.    ERGOCALCIFEROL (ERGOCALCIFEROL) 50,000 UNIT CAP    Take 1 capsule (50,000 Units total) by mouth every 7 days.   Modified Medications    No medications on file   Discontinued Medications     ACETAMINOPHEN (TYLENOL) 325 MG TABLET    Take 2 tablets (650 mg total) by mouth every 6 (six) hours as needed.    ACETAMINOPHEN (TYLENOL) 500 MG CAP    Take 1 capsule (500 mg total) by mouth 2 (two) times a day.    DEXTROAMPHETAMINE-AMPHETAMINE (ADDERALL) 20 MG TABLET    Take 1 tablet by mouth once daily.    DOCUSATE SODIUM (COLACE) 100 MG CAPSULE    Take 1 capsule (100 mg total) by mouth 2 (two) times daily.    NAPROXEN (NAPROSYN) 500 MG TABLET    Take 1 tablet (500 mg total) by mouth 2 (two) times daily with meals.    NORGESTIMATE-ETHINYL ESTRADIOL (ORTHO TRI-CYCLEN LO) 0.18/0.215/0.25 MG-25 MCG TABLET    Take 1 tablet by mouth once daily.    ONDANSETRON (ZOFRAN) 4 MG TABLET    Take 1 tablet (4 mg total) by mouth every 6 (six) hours.    SERTRALINE (ZOLOFT) 100 MG TABLET

## 2022-01-20 NOTE — PATIENT INSTRUCTIONS
EGD Prep Instructions    [facility] [address] [place]  [address]    You are scheduled for an EGD with Dr. Mckenna on 2/8/2022 at [facility].  You will enter through the [facility] entrance and check in at Same Day Surgery.    Nothing to eat or drink after midnight before the procedure.  You MAY brush your teeth.    You MAY take your blood pressure, heart, and seizure medication on the morning of the procedure, with a SIP of water.  Hold ALL other medications until after the procedure.    You must have someone with you to DRIVE YOU HOME since you will be receiving IV sedation for the procedure.    If you are on blood thinners THAT YOU HAVE BEEN INSTRUCTED TO HOLD BY YOUR DOCTOR FOR THIS PROCEDURE, then do NOT take this the morning of your EGD.  Do NOT stop these medications on your own, they must be approved to be held by your doctor.  Your EGD can NOT be done if you are on these medications.  Examples of blood thinners include: Coumadin, Aggrenox, Plavix, Pradaxa, Reapro, Pletal, Xarelto, Ticagrelor, Brilinta, Eliquis, and high dose aspirin (325 mg).  You do not have to stop baby aspirin 81 mg.    You will receive a call a few days before your EGD to tell you the time to arrive.  If you have not received a call by the day before your procedure, call the Pre-op Coordinator at [phone].

## 2022-02-01 ENCOUNTER — TELEPHONE (OUTPATIENT)
Dept: GASTROENTEROLOGY | Facility: CLINIC | Age: 22
End: 2022-02-01
Payer: MEDICAID

## 2022-02-01 DIAGNOSIS — Z01.818 PRE-OP TESTING: ICD-10-CM

## 2022-02-10 ENCOUNTER — TELEPHONE (OUTPATIENT)
Dept: GASTROENTEROLOGY | Facility: CLINIC | Age: 22
End: 2022-02-10
Payer: MEDICAID

## 2022-02-10 NOTE — TELEPHONE ENCOUNTER
----- Message from Maira Champion sent at 2/10/2022  1:46 PM CST -----  Type:  Patient Requesting call back    Who Called:Felicity mendoza  Would the patient rather a call back or a response via [facility]? Call back  Best Call Back Number:[phone]  Additional Information:  Patient Requesting call back regarding gastro.

## 2022-02-16 ENCOUNTER — TELEPHONE (OUTPATIENT)
Dept: GASTROENTEROLOGY | Facility: CLINIC | Age: 22
End: 2022-02-16
Payer: MEDICAID

## 2022-02-16 DIAGNOSIS — K29.70 HELICOBACTER PYLORI GASTRITIS: Primary | ICD-10-CM

## 2022-02-16 DIAGNOSIS — B96.81 HELICOBACTER PYLORI GASTRITIS: Primary | ICD-10-CM

## 2022-02-16 RX ORDER — AMOXICILLIN 500 MG/1
CAPSULE ORAL
Qty: 56 CAPSULE | Refills: 0 | OUTPATIENT
Start: 2022-02-16 | End: 2022-08-27

## 2022-02-16 RX ORDER — CLARITHROMYCIN 500 MG/1
500 TABLET, FILM COATED ORAL 2 TIMES DAILY
Qty: 28 TABLET | Refills: 0 | Status: SHIPPED | OUTPATIENT
Start: 2022-02-16 | End: 2022-03-02

## 2022-02-16 NOTE — TELEPHONE ENCOUNTER
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  Meds sent to pharmacy, instruct to take all 4 meds together and complete all 3 meds as prescribed.  She will need to take existing PPI in bid dosing while on therapy, and then once therapy complete resume once daily dosing.    I did not send Pepto due to her ibuprofen allergy.  RTC with SG as needed

## 2022-02-16 NOTE — TELEPHONE ENCOUNTER
Informed patient of results. Explained to patient how to take medication. Return to the office as needed.

## 2022-05-17 ENCOUNTER — HOSPITAL ENCOUNTER (EMERGENCY)
Facility: HOSPITAL | Age: 22
Discharge: HOME | End: 2022-05-17
Attending: EMERGENCY MEDICINE
Payer: MEDICAID

## 2022-05-17 VITALS
BODY MASS INDEX: 51.91 KG/M2 | DIASTOLIC BLOOD PRESSURE: 88 MMHG | RESPIRATION RATE: 18 BRPM | TEMPERATURE: 99 F | OXYGEN SATURATION: 97 % | WEIGHT: 293 LBS | HEART RATE: 95 BPM | HEIGHT: 63 IN | SYSTOLIC BLOOD PRESSURE: 123 MMHG

## 2022-05-17 DIAGNOSIS — H60.92 OTITIS EXTERNA OF LEFT EAR, UNSPECIFIED CHRONICITY, UNSPECIFIED TYPE: Primary | ICD-10-CM

## 2022-05-17 PROCEDURE — 99283 EMERGENCY DEPT VISIT LOW MDM: CPT | Mod: ER

## 2022-05-17 RX ORDER — CIPROFLOXACIN AND DEXAMETHASONE 3; 1 MG/ML; MG/ML
4 SUSPENSION/ DROPS AURICULAR (OTIC) 2 TIMES DAILY
Qty: 7.5 ML | Refills: 0 | OUTPATIENT
Start: 2022-05-17 | End: 2022-08-27

## 2022-05-17 NOTE — DISCHARGE INSTRUCTIONS
Your symptoms appear to related to otitis externa.  Use the antibiotic ear drops prescribed.  Attend your scheduled with ENT follow-up tomorrow.  Return to the emergency department for any new, worsening or significantly concerning symptoms.    Thank you for coming to our Emergency Department today. It is important to remember that some problems are difficult to diagnose and may not be found during your first visit. Be sure to follow up with your primary care doctor and review any labs/imaging that was performed with them. If you do not have a primary care doctor, you may contact the one listed on your discharge paperwork or you may also call the [facility] Appointment Desk at [phone] to schedule an appointment with one.     All medications may potentially have side effects and it is impossible to predict which medications may give you side effects. If you feel that you are having a negative effect of any medication you should immediately stop taking them and seek medical attention.    Return to the ER with any questions/concerns, new/concerning symptoms, worsening or failure to improve. Do not drive or make any important decisions for 24 hours if you have received any pain medications, sedatives or mood altering drugs during your ER visit.

## 2022-05-17 NOTE — FIRST PROVIDER EVALUATION
Emergency Department TeleTriage Encounter Note      CHIEF COMPLAINT    Chief Complaint   Patient presents with    Otalgia     Pt states she started having left ear ache yesterday and then this morning noticed pus in ear and hearing is muffled       VITAL SIGNS   Initial Vitals [05/17/22 1008]   BP Pulse Resp Temp SpO2   123/88 95 18 98.8 °F (37.1 °C) 97 %      MAP       --            ALLERGIES    Review of patient's allergies indicates:   Allergen Reactions    Ibuprofen Hives       PROVIDER TRIAGE NOTE  This is a teletriage evaluation of a 21 y.o. female presenting to the ED complaining of left earache. Pain started yesterday. She has muffled hearing.     Initial orders will be placed and care will be transferred to an alternate provider when patient is roomed for a full evaluation. Any additional orders and the final disposition will be determined by that provider.         ORDERS  Labs Reviewed   POCT URINE PREGNANCY       ED Orders (720h ago, onward)    Start Ordered     Status Ordering Provider    05/17/22 1011 05/17/22 1010  POCT urine pregnancy  Once         Ordered SAL VENEGAS            Virtual Visit Note: The provider triage portion of this emergency department evaluation and documentation was performed via [employer], a HIPAA-compliant telemedicine application, in concert with a tele-presenter in the room. A face to face patient evaluation with one of my colleagues will occur once the patient is placed in an emergency department room.      DISCLAIMER: This note was prepared with [employer] voice recognition transcription software. Garbled syntax, mangled pronouns, and other bizarre constructions may be attributed to that software system.

## 2022-05-17 NOTE — ED PROVIDER NOTES
Encounter Date: 5/17/2022    SCRIBE #1 NOTE: I, Eden Johns, am scribing for, and in the presence of,  Bruce Fernandez MD. I have scribed the following portions of the note - Other sections scribed: HPI, ROS, PE.       History     Chief Complaint   Patient presents with    Otalgia     Pt states she started having left ear ache yesterday and then this morning noticed pus in ear and hearing is muffled     21 y.o. female with no pertinent medical history who presents to the ED with chief complaint of acute left sided ear pain since yesterday with purulent discharge noted this morning. Patient reports last month she saw an ENT for Q-tip removal in the same ear. Denies right sided ear pain or discharge. No further complaints at this time.     The history is provided by the patient. No  was used.     Review of patient's allergies indicates:   Allergen Reactions    Ibuprofen Hives     Past Medical History:   Diagnosis Date    ADHD (attention deficit hyperactivity disorder)     Anxiety     Constipation     Depression     GERD (gastroesophageal reflux disease)     IBS (irritable bowel syndrome)     Ovarian cyst     Schizophrenia in children      Past Surgical History:   Procedure Laterality Date    ESOPHAGOGASTRODUODENOSCOPY N/A 2/8/2022    Procedure: EGD (ESOPHAGOGASTRODUODENOSCOPY);  Surgeon: Berkley Mckenna MD;  Location: [facility];  Service: Endoscopy;  Laterality: N/A;    WISDOM TOOTH EXTRACTION       Family History   Problem Relation Age of Onset    Asthma Mother     Diabetes Father      Social History     Tobacco Use    Smoking status: Passive Smoke Exposure - Never Smoker    Smokeless tobacco: Never Used   Substance Use Topics    Alcohol use: No    Drug use: Never     Review of Systems   Constitutional: Negative for fever.   HENT: Positive for ear discharge (L) and ear pain (L). Negative for sore throat.    Eyes: Negative for pain.   Respiratory: Negative for shortness of  breath.    Cardiovascular: Negative for chest pain.   Gastrointestinal: Negative for abdominal pain and nausea.   Genitourinary: Negative for dysuria.   Musculoskeletal: Negative for back pain.   Skin: Negative for rash.   Neurological: Negative for weakness.   All other systems reviewed and are negative.      Physical Exam     Initial Vitals [05/17/22 1008]   BP Pulse Resp Temp SpO2   123/88 95 18 98.8 °F (37.1 °C) 97 %      MAP       --         Physical Exam    Nursing note and vitals reviewed.  Constitutional: She is not diaphoretic. No distress.   HENT:   Head: Normocephalic and atraumatic.   Mouth/Throat: Oropharynx is clear and moist.   Left sided external auditory canal erythema. Scant purulent drainage. Possible foreign body.   Eyes: Conjunctivae and EOM are normal. No scleral icterus.   Neck: Neck supple. No JVD present.   Normal range of motion.  Cardiovascular: Normal rate, regular rhythm and intact distal pulses.   Pulmonary/Chest: Breath sounds normal. No stridor. No respiratory distress.   Abdominal: Abdomen is soft. Bowel sounds are normal. She exhibits no distension. There is no abdominal tenderness.   Musculoskeletal:         General: No tenderness or edema. Normal range of motion.      Cervical back: Normal range of motion and neck supple.     Neurological: She is alert. She has normal strength. No cranial nerve deficit or sensory deficit. GCS score is 15. GCS eye subscore is 4. GCS verbal subscore is 5. GCS motor subscore is 6.   Skin: Skin is warm and dry.   Psychiatric: She has a normal mood and affect.         ED Course   Procedures  Labs Reviewed - No data to display       Imaging Results    None          Medications - No data to display  Medical Decision Making:   History:   Old Medical Records: I decided to obtain old medical records.  Differential Diagnosis:   Includes but is not limited to:  Clinical Tests:   Lab Tests: Ordered and Reviewed          Scribe Attestation:   Scribe #1: I  performed the above scribed service and the documentation accurately describes the services I performed. I attest to the accuracy of the note.        ED Course as of 05/17/22 1820   Tue May 17, 2022   1115 Patient is afebrile and in no acute distress.  She has left external ear inflammation with scant purulence and exudates.  Potential foreign body attempted to be removed in appears to just be exudate.  Patient reports she has an ENT appointment scheduled for tomorrow.  She has been prescribed Ciprodex otic drops.  She is stable for discharge to follow up with ENT tomorrow [SG]      ED Course User Index  [SG] Bruce Fernandez MD        This chart was completed using dictation software, as a result there may be some transcription errors.      I, Bruce Fernandez , personally performed the services described in this documentation.  All medical record entries made by the scribe were at my direction and in my presence.  I have reviewed the chart and agree that the record reflects my personal performance and is accurate and complete.  Clinical Impression:   Final diagnoses:  [H60.92] Otitis externa of left ear, unspecified chronicity, unspecified type (Primary)          ED Disposition Condition    Discharge Stable        ED Prescriptions     Medication Sig Dispense Start Date End Date Auth. Provider    ciprofloxacin-dexamethasone 0.3-0.1% (CIPRODEX) 0.3-0.1 % DrpS Place 4 drops into the left ear 2 (two) times daily. 7.5 mL 5/17/2022  Bruce Fernandez MD        Follow-up Information    None          Bruce Fernandez MD  05/17/22 1820

## 2022-08-27 ENCOUNTER — HOSPITAL ENCOUNTER (EMERGENCY)
Facility: HOSPITAL | Age: 22
Discharge: HOME | End: 2022-08-27
Attending: EMERGENCY MEDICINE
Payer: MEDICAID

## 2022-08-27 VITALS
HEART RATE: 99 BPM | TEMPERATURE: 99 F | BODY MASS INDEX: 51.91 KG/M2 | RESPIRATION RATE: 16 BRPM | OXYGEN SATURATION: 97 % | WEIGHT: 293 LBS | HEIGHT: 63 IN | DIASTOLIC BLOOD PRESSURE: 76 MMHG | SYSTOLIC BLOOD PRESSURE: 126 MMHG

## 2022-08-27 DIAGNOSIS — U07.1 COVID-19: Primary | ICD-10-CM

## 2022-08-27 LAB
B-HCG UR QL: NEGATIVE
CTP QC/QA: YES
CTP QC/QA: YES
INFLUENZA A ANTIGEN, POC: NEGATIVE
INFLUENZA B ANTIGEN, POC: NEGATIVE
POC RAPID STREP A: NEGATIVE
SARS-COV-2 RDRP RESP QL NAA+PROBE: POSITIVE

## 2022-08-27 PROCEDURE — 99284 EMERGENCY DEPT VISIT MOD MDM: CPT | Mod: 25,ER

## 2022-08-27 PROCEDURE — 81025 URINE PREGNANCY TEST: CPT | Mod: ER | Performed by: NURSE PRACTITIONER

## 2022-08-27 PROCEDURE — U0002 COVID-19 LAB TEST NON-CDC: HCPCS | Mod: ER | Performed by: NURSE PRACTITIONER

## 2022-08-27 PROCEDURE — 87804 INFLUENZA ASSAY W/OPTIC: CPT | Mod: ER

## 2022-08-27 RX ORDER — PHENOL 1.4 %
AEROSOL, SPRAY (ML) MUCOUS MEMBRANE
Qty: 177 ML | Refills: 0 | Status: SHIPPED | OUTPATIENT
Start: 2022-08-27

## 2022-08-27 RX ORDER — BENZONATATE 100 MG/1
100 CAPSULE ORAL 3 TIMES DAILY PRN
Qty: 20 CAPSULE | Refills: 0 | Status: SHIPPED | OUTPATIENT
Start: 2022-08-27 | End: 2022-09-06

## 2022-08-27 RX ORDER — ACETAMINOPHEN 500 MG
1000 TABLET ORAL EVERY 6 HOURS PRN
Qty: 20 TABLET | Refills: 0 | Status: SHIPPED | OUTPATIENT
Start: 2022-08-27

## 2022-08-27 NOTE — ED PROVIDER NOTES
"Encounter Date: 8/27/2022    SCRIBE #1 NOTE: I, Abi Carty, am scribing for, and in the presence of,  Azul Mendosa NP. I have scribed the following portions of the note - Other sections scribed: HPI, ROS, PE.     History     Chief Complaint   Patient presents with    Sore Throat    Cough     Felicity Cohen 22 y.o. female, with PMHx of GERD, presents to the ED with sore throat and productive coughs onset 2 days ago. Patient states that her symptoms worsened yesterday. Patient reports that her "left tonsil hurts." Patient reports that she has been exposed to COVID-19. Patient denies any other associated symptoms. No known alleviating or aggravating factors. Patient denies taking antibiotics or steroids recently. Patient is allergic to Ibuprofen.    The history is provided by the patient. No  was used.   Review of patient's allergies indicates:   Allergen Reactions    Ibuprofen Hives     Past Medical History:   Diagnosis Date    ADHD (attention deficit hyperactivity disorder)     Anxiety     Constipation     Depression     GERD (gastroesophageal reflux disease)     IBS (irritable bowel syndrome)     Ovarian cyst     Schizophrenia in children      Past Surgical History:   Procedure Laterality Date    ESOPHAGOGASTRODUODENOSCOPY N/A 2/8/2022    Procedure: EGD (ESOPHAGOGASTRODUODENOSCOPY);  Surgeon: Berkley Mckenna MD;  Location: [facility];  Service: Endoscopy;  Laterality: N/A;    WISDOM TOOTH EXTRACTION       Family History   Problem Relation Age of Onset    Asthma Mother     Diabetes Father      Social History     Tobacco Use    Smoking status: Passive Smoke Exposure - Never Smoker    Smokeless tobacco: Never   Substance Use Topics    Alcohol use: No    Drug use: Never     Review of Systems   Constitutional:  Negative for fever.   HENT:  Positive for sore throat.    Eyes:  Negative for photophobia.   Respiratory:  Positive for cough.    Cardiovascular:  Negative for chest pain. "   Gastrointestinal:  Negative for vomiting.   Genitourinary:  Negative for dysuria.   Musculoskeletal:  Negative for neck pain.   Skin:  Negative for rash.   Neurological:  Negative for headaches.   All other systems reviewed and are negative.    Physical Exam     Initial Vitals [08/27/22 0936]   BP Pulse Resp Temp SpO2   133/79 93 17 98.7 °F (37.1 °C) 98 %      MAP       --         Physical Exam    Constitutional: She appears well-developed and well-nourished. She is not diaphoretic. No distress.   HENT:   Head: Normocephalic and atraumatic.   Right Ear: Hearing, tympanic membrane, external ear and ear canal normal.   Left Ear: Hearing, tympanic membrane, external ear and ear canal normal.   Nose: Mucosal edema and rhinorrhea present.   Mouth/Throat: Posterior oropharyngeal erythema present. No oropharyngeal exudate.   Eyes: Conjunctivae and EOM are normal. Pupils are equal, round, and reactive to light. Right eye exhibits no discharge. Left eye exhibits no discharge.   Neck: Neck supple.   Normal range of motion.  Cardiovascular:  Normal rate, regular rhythm, normal heart sounds and intact distal pulses.           Pulmonary/Chest: Breath sounds normal. No respiratory distress.   Abdominal: Abdomen is soft. Bowel sounds are normal. There is no abdominal tenderness. No hernia. There is no rigidity, no rebound, no guarding, no tenderness at McBurney's point and negative Swenson's sign.   Musculoskeletal:         General: Normal range of motion.      Cervical back: Normal range of motion and neck supple.     Neurological: She is alert and oriented to person, place, and time.   Skin: Skin is warm and dry.   Psychiatric: She has a normal mood and affect. Her behavior is normal.       ED Course   Procedures  Labs Reviewed   SARS-COV-2 RDRP GENE - Abnormal; Notable for the following components:       Result Value    POC Rapid COVID Positive (*)     All other components within normal limits    Narrative:     This test  utilizes isothermal nucleic acid amplification   technology to detect the SARS-CoV-2 RdRp nucleic acid segment.   The analytical sensitivity (limit of detection) is 125 genome   equivalents/mL.   A POSITIVE result implies infection with the SARS-CoV-2 virus;   the patient is presumed to be contagious.     A NEGATIVE result means that SARS-CoV-2 nucleic acids are not   present above the limit of detection. A NEGATIVE result should be   treated as presumptive. It does not rule out the possibility of   COVID-19 and should not be the sole basis for treatment decisions.   If COVID-19 is strongly suspected based on clinical and exposure   history, re-testing using an alternate molecular assay should be   considered.   This test is only for use under the Food and Drug   Administration s Emergency Use Authorization (EUA).   Commercial kits are provided by [employer].   Performance characteristics of the EUA have been independently   verified by [facility] Department of   Pathology and Laboratory Medicine.   _________________________________________________________________   The authorized Fact Sheet for Healthcare Providers and the authorized Fact   Sheet for Patients of the ID NOW COVID-19 are available on the FDA   website:     https://www.fda.gov/media/788900/download  https://www.fda.gov/media/290042/download          POCT URINE PREGNANCY   POCT INFLUENZA A/B MOLECULAR   POCT STREP A MOLECULAR   POCT STREP A, RAPID   POCT RAPID INFLUENZA A/B          Imaging Results    None          Medications - No data to display  Medical Decision Making:   History:   Old Medical Records: I decided to obtain old medical records.  Clinical Tests:   Lab Tests: Ordered and Reviewed  ED Management:  This is an evaluation of a 22 y.o. female that presents to the Emergency Department for  URI symptoms. The patient is a non-toxic, afebrile, and well appearing female. On physical exam ears and pharynx are without evidence  of infection. Appears well hydrated with moist mucus membranes. Neck soft and supple with no meningeal signs or cervical lymphadenopathy. Breath sounds are clear and equal bilaterally with no adventitious breath sounds, tachypnea or respiratory distress with room air pulse ox of 98% and no evidence of hypoxia.     Vital Signs Are Reassuring.  RESULTS:   UPT negative.  Flu negative.  Strep negative.    COVID positive.    My overall impression is COVID 19. I considered, but at this time, do not suspect OM, OE, strep pharyngitis, meningitis, pneumonia, or acute bacterial sinusitis.    The diagnosis, treatment plan, instructions for follow-up and reevaluation with PCP as well as ED return precautions were discussed and understanding was verbalized. All questions or concerns have been addressed.         Scribe Attestation:   Scribe #1: I performed the above scribed service and the documentation accurately describes the services I performed. I attest to the accuracy of the note.              Scribe attestation: I, ELZA Mendosa, personally performed the services described in this documentation. All medical record entries made by the scribe were at my direction and in my presence.  I have reviewed the chart and agree that the record reflects my personal performance and is accurate and complete.   Clinical Impression:   Final diagnoses:  [U07.1] COVID-19 (Primary)      ED Disposition Condition    Discharge Stable          ED Prescriptions       Medication Sig Dispense Start Date End Date Auth. Provider    acetaminophen (TYLENOL) 500 MG tablet Take 2 tablets (1,000 mg total) by mouth every 6 (six) hours as needed for Pain or Temperature greater than (100.4). 20 tablet 8/27/2022 -- Azul Mendosa NP    benzonatate (TESSALON) 100 MG capsule Take 1 capsule (100 mg total) by mouth 3 (three) times daily as needed for Cough. 20 capsule 8/27/2022 9/6/2022 Azul Mendosa NP    phenoL (THROAT SPRAY) 1.4 % SprA by Mucous Membrane  route every 2 (two) hours as needed (sore throat). 177 mL 8/27/2022 -- Azul Mendosa NP          Follow-up Information       Follow up With Specialties Details Why Contact Info    Deacon Aguiar MD Family Medicine Schedule an appointment as soon as possible for a visit  For follow-up [address] [place] [id number] [phone]      [facility] ED Emergency Medicine Go to  If symptoms worsen [address] [facility] [address] [phone]             Azul Mendosa NP  08/27/22 3060

## 2022-08-27 NOTE — DISCHARGE INSTRUCTIONS
Thank you for coming to our Emergency Department today. It is important to remember that some problems or medical conditions are difficult to diagnose and may not be found during your Emergency Department visit.     Be sure to follow up with your primary care doctor and review all labs/imaging/tests that were performed during your ER visit with them. Some labs/tests may be outside of the normal range and require non-emergent follow-up and further investigation to help diagnose/exclude/prevent complications or other potentially serious medical conditions that were not addressed during your ER visit.    If you do not have a primary care doctor, you may contact the one listed on your discharge paperwork or you may also call the [facility] Appointment Desk at [phone] to schedule an appointment and establish care with one. It is important to your health that you have a primary care doctor.    Please take all medications as directed. All medications may potentially have side-effects and it is impossible to predict which medications may give you side-effects or what side-effects (if any) they will give you.. If you feel that you are having a negative effect or side-effect of any medication you should immediately stop taking them and seek medical attention. If you feel that you are having a life-threatening reaction call 911.    Return to the ER with any questions/concerns, new/concerning symptoms, worsening or failure to improve.     Do not drive, swim, climb to height, take a bath, operate heavy machinery, drink alcohol or take potentially sedating medications, sign any legal documents or make any important decisions for 24 hours if you have received any pain medications, sedatives or mood altering drugs during your ER visit or within 24 hours of taking them if they have been prescribed to you.     You can find additional resources for Dentists, hearing aids, durable medical equipment, low cost pharmacies and  other resources at https://[facility].org    BELOW THIS LINE ONLY APPLIES IF YOU HAVE A COVID TEST PENDING OR IF YOU HAVE BEEN DIAGNOSED WITH COVID:  Please access [facility] to review the results of your test. Until the results of your COVID test return, you should isolate yourself so as not to potentially spread illness to others.   If your COVID test returns positive, you should isolate yourself so as not to spread illness to others. After five full days, if you are feeling better and you have not had fever for 24 hours, you can return to your typical daily activities, but you must wear a mask around others for an additional 5 days.   If your COVID test returns negative and you are either unvaccinated or more than six months out from your two-dose vaccine and are not yet boosted, you should still quarantine for 5 full days followed by strict mask use for an additional 5 full days.   If your COVID test returns negative and you have received your 2-dose initial vaccine as well as a booster, you should continue strict mask use for 10 full days after the exposure.  For all those exposed, best practice includes a test at day 5 after the exposure. This can be a home test or a test through one of the many testing centers throughout our community.   Masking is always advised to limit the spread of COVID. Cdc.gov is an excellent site to obtain the latest up to date recommendations regarding COVID and COVID testing.     CDC Testing and Quarantine Guidelines for patients with exposure to a known-positive COVID-19 person:  A close exposure is defined as anyone who has had an exposure (masked or unmasked) to a known COVID -19 positive person within 6 feet of someone for a cumulative total of 15 minutes or more over a 24-hour period.   Vaccinated and/or if you recently had a positive covid test within 90 days do NOT need to quarantine after contact with someone who had COVID-19 unless you develop symptoms.   Fully vaccinated  people who have not had a positive test within 90 days, should get tested 3-5 days after their exposure, even if they don't have symptoms and wear a mask indoors in public for 14 days following exposure or until their test result is negative.      Unvaccinated and/or NOT had a positive test within 90 days and meet close exposure  You are required by CDC guidelines to quarantine for at least 5 days from time of exposure followed by 5 days of strict masking. It is recommended, but not required to test after 5 days, unless you develop symptoms, in which case you should test at that time.  If you get tested after 5 days and your test is positive, your 5 day period of isolation starts the day of the positive test.    If your exposure does not meet the above definition, you can return to your normal daily activities to include social distancing, wearing a mask and frequent handwashing.      Here is a link to guidance from the CDC:  https://www.cdc.gov/media/releases/2021/s1227-isolation-quarantine-guidance.html      [place] Dept Of Health Testing Sites:  https://ldh.la.gov/page/3934      [facility] website with testing locations and guidance:  https://[email or web address].[email or web address]

## 2022-08-27 NOTE — Clinical Note
"Radumarcelina OTTO Cohen (Deranika) was seen and treated in our emergency department on 8/27/2022.     COVID-19 is present in our communities across the state. There is limited testing for COVID at this time, so not all patients can be tested. In this situation, your employee meets the following criteria:    Felicity Cohen has met the criteria for COVID-19 testing and has a POSITIVE result. She can return to work once they are asymptomatic for 24 hours without the use of fever reducing medications AND at least five days from the first positive result. A mask is recommended for 5 days post quarantine.     If you have any questions or concerns, or if I can be of further assistance, please do not hesitate to contact me.    Sincerely,             Azul Mendosa NP"

## 2022-08-28 ENCOUNTER — NURSE TRIAGE (OUTPATIENT)
Dept: ADMINISTRATIVE | Facility: CLINIC | Age: 22
End: 2022-08-28
Payer: MEDICAID

## 2022-08-28 NOTE — TELEPHONE ENCOUNTER
"Pt escalated in [facility] queue. Tested positive for Covid yesterday. Pt is having dizziness when walking. Only happens sometimes. Comes and goes. Having nasal congestion. Denies severe dizziness, still able to perform daily activities. Denies fever, sob. Dispo is home care. Gave home care advice. Advised to call back with further concerns.    Reason for Disposition   Dizziness caused by poor fluid intake (all triage questions negative)    Additional Information   Negative: Severe difficulty breathing (e.g., struggling for each breath, speaks in single words)   Negative: [1] Difficulty breathing or swallowing AND [2] started suddenly after medicine, an allergic food or bee sting   Negative: Shock suspected (e.g., cold/pale/clammy skin, too weak to stand)   Negative: Difficult to awaken or acting confused  (e.g., disoriented, slurred speech)   Negative: [1] Weakness (i.e., paralysis, loss of muscle strength) of the face, arm or leg on one side of the body AND [2] sudden onset AND [3] present now   Negative: [1] Numbness (i.e., loss of sensation) of the face, arm or leg on one side of the body AND [2] sudden onset AND [3] present now   Negative: [1] Loss of speech or garbled speech AND [2] sudden onset AND [3] present now   Negative: Overdose (accidental or intentional) of medications   Negative: [1] Fainted > 15 minutes ago AND [2] still feels too weak or dizzy to stand   Negative: Heart beating < 50 beats per minute OR > 140 beats per minute   Negative: Sounds like a life-threatening emergency to the triager   Negative: Difficulty breathing   Negative: SEVERE dizziness (e.g., unable to stand, requires support to walk, feels like passing out now)   Negative: Extra heart beats OR irregular heart beating  (i.e., "palpitations")   Negative: [1] Drinking very little AND [2] dehydration suspected (e.g., no urine > 12 hours, very dry mouth, very lightheaded)   Negative: Patient sounds very sick or weak to the triager   Negative: " [1] Dizziness caused by heat exposure, sudden standing, or poor fluid intake AND [2] no improvement after 2 hours of rest and fluids   Negative: [1] Fever > 103 F (39.4 C) AND [2] not able to get the fever down using Fever Care Advice   Negative: [1] Fever > 101 F (38.3 C) AND [2] age > 60   Negative: [1] Fever > 101 F (38.3 C) AND [2] bedridden (e.g.,  nursing home patient, CVA, chronic illness, recovering from surgery)   Negative: [1] Fever > 100.5 F (38.1 C) AND [2] diabetes mellitus or immunocompromised (e.g., HIV positive, cancer chemo, splenectomy, organ transplant, chronic steroids)   Negative: [1] MODERATE dizziness (e.g., interferes with normal activities) AND [2] has NOT been evaluated by physician for this  (EXCEPTION: dizziness caused by heat exposure, sudden standing, or poor fluid intake)   Negative: Fever present > 3 days (72 hours)   Negative: Taking a medicine that could cause dizziness (e.g., blood pressure medications, diuretics)   Negative: [1] MODERATE dizziness (e.g., interferes with normal activities) AND [2] has been evaluated by physician for this   Negative: [1] MILD dizziness (e.g., walking normally) AND [2] has NOT been evaluated by physician for this  (EXCEPTION: dizziness caused by heat exposure, sudden standing, or poor fluid intake)   Negative: Known or suspected alcohol or drug abuse   Negative: [1] MILD dizziness (e.g., walking normally) AND [2] has been evaluated by physician for this   Negative: Dizziness is a chronic symptom (recurrent or ongoing AND lasting > 4 weeks)   Negative: Dizziness caused by recent heat exposure (all triage questions negative)   Negative: Dizziness caused by sudden or prolonged standing (all triage questions negative)    Protocols used: Dizziness-A-AH

## 2022-11-16 ENCOUNTER — HOSPITAL ENCOUNTER (EMERGENCY)
Facility: HOSPITAL | Age: 22
Discharge: HOME | End: 2022-11-16
Attending: INTERNAL MEDICINE
Payer: MEDICAID

## 2022-11-16 VITALS
WEIGHT: 270 LBS | TEMPERATURE: 98 F | RESPIRATION RATE: 18 BRPM | OXYGEN SATURATION: 100 % | DIASTOLIC BLOOD PRESSURE: 65 MMHG | SYSTOLIC BLOOD PRESSURE: 125 MMHG | HEART RATE: 83 BPM | BODY MASS INDEX: 44.98 KG/M2 | HEIGHT: 65 IN

## 2022-11-16 DIAGNOSIS — N30.01 ACUTE CYSTITIS WITH HEMATURIA: Primary | ICD-10-CM

## 2022-11-16 LAB
ALBUMIN SERPL-MCNC: 3.8 G/DL (ref 3.3–5.5)
ALP SERPL-CCNC: 70 U/L (ref 42–141)
B-HCG UR QL: NEGATIVE
BILIRUB SERPL-MCNC: 0.5 MG/DL (ref 0.2–1.6)
BILIRUBIN, POC UA: NEGATIVE
BLOOD, POC UA: ABNORMAL
BUN SERPL-MCNC: 12 MG/DL (ref 7–22)
CALCIUM SERPL-MCNC: 9.9 MG/DL (ref 8–10.3)
CHLORIDE SERPL-SCNC: 105 MMOL/L (ref 98–108)
CLARITY, POC UA: CLEAR
COLOR, POC UA: YELLOW
CREAT SERPL-MCNC: 0.5 MG/DL (ref 0.6–1.2)
CTP QC/QA: YES
GLUCOSE SERPL-MCNC: 88 MG/DL (ref 73–118)
GLUCOSE, POC UA: NEGATIVE
KETONES, POC UA: NEGATIVE
LEUKOCYTE EST, POC UA: ABNORMAL
NITRITE, POC UA: NEGATIVE
PH UR STRIP: 5.5 [PH]
POC ALT (SGPT): 24 U/L (ref 10–47)
POC AST (SGOT): 27 U/L (ref 11–38)
POC TCO2: 27 MMOL/L (ref 18–33)
POTASSIUM BLD-SCNC: 4 MMOL/L (ref 3.6–5.1)
PROTEIN, POC UA: NEGATIVE
PROTEIN, POC: 7.4 G/DL (ref 6.4–8.1)
SODIUM BLD-SCNC: 144 MMOL/L (ref 128–145)
SPECIFIC GRAVITY, POC UA: 1.02
UROBILINOGEN, POC UA: 0.2 E.U./DL

## 2022-11-16 PROCEDURE — 81025 URINE PREGNANCY TEST: CPT | Mod: ER | Performed by: INTERNAL MEDICINE

## 2022-11-16 PROCEDURE — 96375 TX/PRO/DX INJ NEW DRUG ADDON: CPT | Mod: ER

## 2022-11-16 PROCEDURE — 63600175 PHARM REV CODE 636 W HCPCS: Mod: ER | Performed by: INTERNAL MEDICINE

## 2022-11-16 PROCEDURE — 80053 COMPREHEN METABOLIC PANEL: CPT | Mod: ER

## 2022-11-16 PROCEDURE — 96374 THER/PROPH/DIAG INJ IV PUSH: CPT | Mod: ER

## 2022-11-16 PROCEDURE — 99285 EMERGENCY DEPT VISIT HI MDM: CPT | Mod: 25,ER

## 2022-11-16 PROCEDURE — 25000003 PHARM REV CODE 250: Mod: ER | Performed by: INTERNAL MEDICINE

## 2022-11-16 PROCEDURE — 81003 URINALYSIS AUTO W/O SCOPE: CPT | Mod: ER

## 2022-11-16 PROCEDURE — 85025 COMPLETE CBC W/AUTO DIFF WBC: CPT | Mod: ER

## 2022-11-16 RX ORDER — NITROFURANTOIN 25; 75 MG/1; MG/1
100 CAPSULE ORAL EVERY 12 HOURS
Status: DISCONTINUED | OUTPATIENT
Start: 2022-11-16 | End: 2022-11-17 | Stop reason: HOSPADM

## 2022-11-16 RX ORDER — ONDANSETRON 2 MG/ML
8 INJECTION INTRAMUSCULAR; INTRAVENOUS
Status: COMPLETED | OUTPATIENT
Start: 2022-11-16 | End: 2022-11-16

## 2022-11-16 RX ORDER — ACETAMINOPHEN 500 MG
1000 TABLET ORAL
Status: COMPLETED | OUTPATIENT
Start: 2022-11-16 | End: 2022-11-16

## 2022-11-16 RX ORDER — FAMOTIDINE 10 MG/ML
20 INJECTION INTRAVENOUS
Status: COMPLETED | OUTPATIENT
Start: 2022-11-16 | End: 2022-11-16

## 2022-11-16 RX ORDER — NITROFURANTOIN 25; 75 MG/1; MG/1
100 CAPSULE ORAL 2 TIMES DAILY
Qty: 14 CAPSULE | Refills: 0 | Status: SHIPPED | OUTPATIENT
Start: 2022-11-16 | End: 2022-11-23

## 2022-11-16 RX ADMIN — ONDANSETRON 8 MG: 2 INJECTION INTRAMUSCULAR; INTRAVENOUS at 09:11

## 2022-11-16 RX ADMIN — FAMOTIDINE 20 MG: 10 INJECTION INTRAVENOUS at 09:11

## 2022-11-16 RX ADMIN — NITROFURANTOIN (MONOHYDRATE/MACROCRYSTALS) 100 MG: 75; 25 CAPSULE ORAL at 11:11

## 2022-11-16 RX ADMIN — ACETAMINOPHEN 1000 MG: 500 TABLET ORAL at 09:11

## 2022-11-17 NOTE — ED PROVIDER NOTES
Encounter Date: 11/16/2022    SCRIBE #1 NOTE: I, Mat Perla, am scribing for, and in the presence of,  Hemanth Lance MD. I have scribed the following portions of the note - Other sections scribed: HPI, ROS, PE.     History     Chief Complaint   Patient presents with    Abdominal Pain     Right-sided abdominal pain pt describes is constant with a burning sensation when she touches the area x3 days; Pt reports nausea after eating; Denies V/D, denies fever; LMP 11/12/2022     Felicity Cohen is a 22 y.o. female, with a PMHx of GERD, IBS, and ovarian cyst, who presents to the ED with right- sided abdominal pain. Patient reports becoming nauseous after eating with no vomiting. She notes that pain is temporarily relieved when resting. Denies pain with palpation, diarrhea, fever, cough, rhinorrhea, shortness of breath, constipation or other associated symptoms.      The history is provided by the patient.   Review of patient's allergies indicates:   Allergen Reactions    Ibuprofen Hives     Past Medical History:   Diagnosis Date    ADHD (attention deficit hyperactivity disorder)     Anxiety     Constipation     Depression     GERD (gastroesophageal reflux disease)     IBS (irritable bowel syndrome)     Ovarian cyst     Schizophrenia in children      Past Surgical History:   Procedure Laterality Date    ESOPHAGOGASTRODUODENOSCOPY N/A 2/8/2022    Procedure: EGD (ESOPHAGOGASTRODUODENOSCOPY);  Surgeon: Berkley Mckenna MD;  [facility];  Service: Endoscopy;  Laterality: N/A;    WISDOM TOOTH EXTRACTION       Family History   Problem Relation Age of Onset    Asthma Mother     Diabetes Father      Social History     Tobacco Use    Smoking status: Passive Smoke Exposure - Never Smoker    Smokeless tobacco: Never   Substance Use Topics    Alcohol use: No    Drug use: Never     Review of Systems   Constitutional:  Negative for fever.   HENT:  Negative for congestion, rhinorrhea and sore throat.    Respiratory:   Negative for cough and shortness of breath.    Cardiovascular:  Negative for chest pain.   Gastrointestinal:  Positive for abdominal pain and nausea. Negative for constipation, diarrhea and vomiting.   Genitourinary:  Negative for dysuria.   Musculoskeletal:  Negative for back pain.   Skin:  Negative for rash.   Neurological:  Negative for weakness and headaches.   Psychiatric/Behavioral:  Negative for behavioral problems.    All other systems reviewed and are negative.    Physical Exam     Initial Vitals [11/16/22 1920]   BP Pulse Resp Temp SpO2   (!) 132/90 94 18 98.2 °F (36.8 °C) 99 %      MAP       --         Physical Exam    Nursing note and vitals reviewed.  Constitutional: She appears well-developed and well-nourished. She is not diaphoretic. She is Obese . No distress.   HENT:   Head: Normocephalic and atraumatic.   Eyes: Conjunctivae are normal. Pupils are equal, round, and reactive to light.   Neck: Neck supple.   Normal range of motion.  Cardiovascular:  Normal rate and intact distal pulses.           Pulmonary/Chest: No accessory muscle usage. No tachypnea. No respiratory distress.   Abdominal: She exhibits no distension. There is abdominal tenderness in the right lower quadrant.   RLQ abd slight tenderness w/ palpations. No peritoneal signs.    Musculoskeletal:         General: No tenderness. Normal range of motion.      Cervical back: Normal range of motion and neck supple.     Neurological: She is alert and oriented to person, place, and time. She has normal strength. Gait normal. GCS eye subscore is 4. GCS verbal subscore is 5. GCS motor subscore is 6.   Skin: Skin is warm. Capillary refill takes less than 2 seconds.   Psychiatric: She has a normal mood and affect.       ED Course   Procedures  Labs Reviewed   POCT URINALYSIS W/O SCOPE - Abnormal; Notable for the following components:       Result Value    Blood, UA 3+ (*)     Leukocytes, UA 2+ (*)     All other components within normal limits   POCT  CMP - Abnormal; Notable for the following components:    POC Creatinine 0.5 (*)     All other components within normal limits   POCT URINE PREGNANCY   POCT CBC   POCT URINALYSIS W/O SCOPE   POCT CMP            Imaging Results              CT Renal Stone Study ABD Pelvis WO (Final result)  Result time 11/16/22 22:52:47      Final result by Lizandro Mercedes MD (11/16/22 22:52:47)                   Impression:      1. No acute abnormality.  2. Diverticulosis of the sigmoid colon.  3. Possible constipation.      Electronically signed by: Lizandro Mercedes  Date:    11/16/2022  Time:    22:52               Narrative:    EXAMINATION:  CT RENAL STONE STUDY ABD PELVIS WO    CLINICAL HISTORY:  Flank pain, kidney stone suspected;    TECHNIQUE:  Low dose axial images, sagittal and coronal reformations were obtained from the lung bases to the pubic symphysis.  Contrast was not administered.    COMPARISON:  09/07/2020    FINDINGS:  Heart: Normal in size. No pericardial effusion.    Lung Bases: Well aerated, without consolidation or pleural fluid.    Liver: Normal in size and attenuation, with no focal hepatic lesions.    Gallbladder: No calcified gallstones.    Bile Ducts: No evidence of dilated ducts.    Pancreas: No mass or peripancreatic fat stranding.    Spleen: Unremarkable.    Adrenals: Unremarkable.    Kidneys/ Ureters: No stone, mass, hydronephrosis or hydroureter bilaterally.    Bladder: No evidence of wall thickening.    Reproductive organs: The uterus is within normal limits.    GI Tract/Mesentery: No evidence of bowel obstruction or inflammation.  Mild diverticulosis of the sigmoid colon.  No evidence of acute diverticulitis.    Moderate retained feces in the colon.    Peritoneal Space: No ascites. No free air.    Retroperitoneum: No significant adenopathy.    Abdominal wall: Unremarkable.    Vasculature: No significant atherosclerosis or aneurysm.    Bones: No acute fracture.    The appendix is within normal limits.                                        Medications   nitrofurantoin (macrocrystal-monohydrate) 100 MG capsule 100 mg (has no administration in time range)   ondansetron injection 8 mg (8 mg Intravenous Given 11/16/22 2121)   acetaminophen tablet 1,000 mg (1,000 mg Oral Given 11/16/22 2121)   famotidine (PF) injection 20 mg (20 mg Intravenous Given 11/16/22 2121)     Medical Decision Making:   History:   Old Medical Records: I decided to obtain old medical records.  Initial Assessment:   Felicity Cohen is a 22 y.o. female, with a PMHx of GERD, IBS, and ovarian cyst, who presents to the ED with right- sided abdominal pain. Patient reports becoming nauseous after eating with no vomiting. She notes that pain is temporarily relieved when resting. Denies pain with palpation, diarrhea, fever, cough, rhinorrhea, shortness of breath, congestion or other associated symptoms.    Clinical Tests:   Lab Tests: Ordered and Reviewed  ED Management:  Urinalysis reveals signs of infection.  CT of abdomen and pelvis using renal stone protocol reveals no evidence of obstructive renal stone.  Macrobid was given in the emergency department as well as a prescription for Macrobid.  Patient was advised to follow-up with her primary care physician within the next week for re-evaluation/return to the emergency department if condition worsens.  Instructions for acute cystitis were given prior to discharge.        Scribe Attestation:   Scribe #1: I performed the above scribed service and the documentation accurately describes the services I performed. I attest to the accuracy of the note.             This document was produced by a scribe under my direction and in my presence. I agree with the content of the note and have made any necessary edits.     Dr. Lance    11/16/2022 11:35 PM         Clinical Impression:   Final diagnoses:  [N30.01] Acute cystitis with hematuria (Primary)      ED Disposition Condition    Discharge Stable          ED  Prescriptions       Medication Sig Dispense Start Date End Date Auth. Provider    nitrofurantoin, macrocrystal-monohydrate, (MACROBID) 100 MG capsule Take 1 capsule (100 mg total) by mouth 2 (two) times daily. for 7 days 14 capsule 11/16/2022 11/23/2022 Hemanth Lance MD          Follow-up Information       Follow up With Specialties Details Why Contact Info    Brennen Ryan MD Family Medicine Schedule an appointment as soon as possible for a visit in 1 week For reevaluation [address] [facility] [address] [phone]               Hemanth Lance MD  11/16/22 1156

## 2023-03-17 ENCOUNTER — HOSPITAL ENCOUNTER (EMERGENCY)
Facility: HOSPITAL | Age: 23
Discharge: LEFT BEFORE DISCHARGE | End: 2023-03-17
Payer: MEDICAID

## 2023-03-17 VITALS
DIASTOLIC BLOOD PRESSURE: 81 MMHG | HEIGHT: 64 IN | OXYGEN SATURATION: 97 % | HEART RATE: 87 BPM | SYSTOLIC BLOOD PRESSURE: 127 MMHG | RESPIRATION RATE: 16 BRPM | BODY MASS INDEX: 49.51 KG/M2 | WEIGHT: 290 LBS | TEMPERATURE: 99 F

## 2023-03-17 LAB
B-HCG UR QL: NEGATIVE
CTP QC/QA: YES

## 2023-03-17 PROCEDURE — 99282 EMERGENCY DEPT VISIT SF MDM: CPT | Mod: ER

## 2023-03-17 PROCEDURE — 81025 URINE PREGNANCY TEST: CPT | Mod: ER | Performed by: NURSE PRACTITIONER

## 2023-08-23 ENCOUNTER — HOSPITAL ENCOUNTER (EMERGENCY)
Facility: HOSPITAL | Age: 23
Discharge: HOME | End: 2023-08-23
Attending: EMERGENCY MEDICINE
Payer: MEDICAID

## 2023-08-23 VITALS
WEIGHT: 293 LBS | HEART RATE: 95 BPM | SYSTOLIC BLOOD PRESSURE: 128 MMHG | HEIGHT: 65 IN | TEMPERATURE: 99 F | BODY MASS INDEX: 48.82 KG/M2 | DIASTOLIC BLOOD PRESSURE: 98 MMHG | OXYGEN SATURATION: 97 % | RESPIRATION RATE: 18 BRPM

## 2023-08-23 DIAGNOSIS — L50.9 URTICARIA: Primary | ICD-10-CM

## 2023-08-23 PROCEDURE — 25000003 PHARM REV CODE 250: Mod: ER | Performed by: EMERGENCY MEDICINE

## 2023-08-23 PROCEDURE — 99283 EMERGENCY DEPT VISIT LOW MDM: CPT | Mod: ER

## 2023-08-23 PROCEDURE — 63600175 PHARM REV CODE 636 W HCPCS: Mod: ER | Performed by: EMERGENCY MEDICINE

## 2023-08-23 RX ORDER — FAMOTIDINE 20 MG/1
20 TABLET, FILM COATED ORAL
Status: COMPLETED | OUTPATIENT
Start: 2023-08-23 | End: 2023-08-23

## 2023-08-23 RX ORDER — PREDNISONE 20 MG/1
40 TABLET ORAL DAILY
Qty: 10 TABLET | Refills: 0 | Status: SHIPPED | OUTPATIENT
Start: 2023-08-23 | End: 2023-08-28

## 2023-08-23 RX ADMIN — FAMOTIDINE 20 MG: 20 TABLET, FILM COATED ORAL at 09:08

## 2023-08-23 RX ADMIN — PREDNISONE 50 MG: 20 TABLET ORAL at 09:08

## 2023-08-24 NOTE — ED PROVIDER NOTES
Encounter Date: 8/23/2023       History     Chief Complaint   Patient presents with    Rash     Reports intermittent urticaria to generalized body for over 1 weeks. States intermittent areas(face yesterday and stomach today) developing rash over last 24 hours.      This patient presents with a diffuse urticarial rash.  No shortness of breath no secretions no fever.  Patient unknown exposure.  It has been going on for 1 week now.    The history is provided by the patient.     Review of patient's allergies indicates:   Allergen Reactions    Ibuprofen Hives     Past Medical History:   Diagnosis Date    ADHD (attention deficit hyperactivity disorder)     Anxiety     Constipation     Depression     GERD (gastroesophageal reflux disease)     IBS (irritable bowel syndrome)     Ovarian cyst     Schizophrenia in children      Past Surgical History:   Procedure Laterality Date    ESOPHAGOGASTRODUODENOSCOPY N/A 2/8/2022    Procedure: EGD (ESOPHAGOGASTRODUODENOSCOPY);  Surgeon: Berkley Mckenna MD;  Location: [facility];  Service: Endoscopy;  Laterality: N/A;    WISDOM TOOTH EXTRACTION       Family History   Problem Relation Age of Onset    Asthma Mother     Diabetes Father      Social History     Tobacco Use    Smoking status: Never     Passive exposure: Yes    Smokeless tobacco: Never   Substance Use Topics    Alcohol use: No    Drug use: Never     Review of Systems   Constitutional: Negative.    HENT: Negative.     Eyes: Negative.    Respiratory: Negative.  Negative for wheezing and stridor.    Cardiovascular: Negative.    Gastrointestinal: Negative.    Endocrine: Negative.    Genitourinary: Negative.    Musculoskeletal: Negative.    Skin:  Positive for rash.   Allergic/Immunologic: Negative.    Neurological: Negative.    Hematological: Negative.    Psychiatric/Behavioral: Negative.     All other systems reviewed and are negative.      Physical Exam     Initial Vitals [08/23/23 2046]   BP Pulse Resp Temp SpO2   (!) 128/98  95 18 98.6 °F (37 °C) 97 %      MAP       --         Physical Exam    Nursing note and vitals reviewed.  Constitutional: Vital signs are normal. She is Obese . She is active and cooperative.   HENT:   Head: Normocephalic and atraumatic.   Mouth/Throat: Uvula is midline and oropharynx is clear and moist.   Eyes: Conjunctivae, EOM and lids are normal. Pupils are equal, round, and reactive to light.   Neck: Trachea normal and phonation normal. Neck supple. No thyroid mass present. No stridor present.   Normal range of motion.   Full passive range of motion without pain.     Cardiovascular:  Normal rate, regular rhythm, S1 normal, S2 normal, normal heart sounds, intact distal pulses and normal pulses.           Pulmonary/Chest: Effort normal and breath sounds normal.   Abdominal: Abdomen is soft and protuberant. Bowel sounds are normal.   Musculoskeletal:         General: Normal range of motion.      Cervical back: Full passive range of motion without pain, normal range of motion and neck supple.     Lymphadenopathy:     She has no axillary adenopathy.   Neurological: She is alert and oriented to person, place, and time.   Skin: Skin is warm, dry and intact.   Psychiatric: She has a normal mood and affect. Her speech is normal and behavior is normal. Judgment and thought content normal. Cognition and memory are normal.         ED Course   Procedures  Labs Reviewed - No data to display       Imaging Results    None          Medications   predniSONE tablet 50 mg (50 mg Oral Given 8/23/23 2131)   famotidine tablet 20 mg (20 mg Oral Given 8/23/23 2131)     Medical Decision Making  Urticarial rash for 1 week with unknown etiology.  Patient has no signs of anaphylaxis or respiratory distress.  Patient is given appropriate medications here in the emergency department subsequently discharged home with instructions on managing an urticarial rash on therefore allergic reaction.                               Clinical Impression:    Final diagnoses:  [L50.9] Urticaria (Primary)        ED Disposition Condition    Discharge Stable          ED Prescriptions       Medication Sig Dispense Start Date End Date Auth. Provider    predniSONE (DELTASONE) 20 MG tablet Take 2 tablets (40 mg total) by mouth once daily. for 5 days 10 tablet 8/23/2023 8/28/2023 Rock Howard MD          Follow-up Information       Follow up With Specialties Details Why Contact Info    Brennen Ryan MD Family Medicine Schedule an appointment as soon as possible for a visit in 1 week  [address] [facility] [address] [phone]               Rock Howard MD  08/23/23 2110

## 2024-05-09 ENCOUNTER — TELEPHONE (OUTPATIENT)
Dept: GASTROENTEROLOGY | Facility: CLINIC | Age: 24
End: 2024-05-09
Payer: MEDICAID

## 2024-05-09 NOTE — TELEPHONE ENCOUNTER
Patient asked if we can sent a gastro referral to [facility] because it is closer to her home. I informed patient that unfortunately due to her insurance, there isn't any GI providers within [facility] that are currently seeing medicaid patients and that is why she was scheduled at this location. Patient expressed verbal understanding.    ----- Message from Keerthi Birch sent at 5/9/2024 11:43 AM CDT -----  Type:  Patient Requesting Referral    Who Called: Pt   Does the patient already have the specialty appointment scheduled?: Yes   If yes, what is the date of that appointment?: 07/01 at 10AM   Referral to What Specialty: Gastro   Reason for Referral: Changes in stool   Does the patient want the referral with a specific physician?: Yes, [facility]   [facility]   Patient Requesting a Response?: Yes   Would the patient rather a call back or a response via [facility]? Call back   Best Call Back Number: [phone]  Additional Information: Please be advised, pt states if she can have a referral sent to [facility] location since it is closer, also pt is former pt of Dr. Mckenna's

## 2024-07-01 ENCOUNTER — OFFICE VISIT (OUTPATIENT)
Dept: GASTROENTEROLOGY | Facility: CLINIC | Age: 24
End: 2024-07-01
Payer: MEDICAID

## 2024-07-01 VITALS
BODY MASS INDEX: 48.82 KG/M2 | DIASTOLIC BLOOD PRESSURE: 87 MMHG | HEART RATE: 90 BPM | SYSTOLIC BLOOD PRESSURE: 129 MMHG | WEIGHT: 293 LBS | HEIGHT: 65 IN

## 2024-07-01 DIAGNOSIS — K59.04 CHRONIC IDIOPATHIC CONSTIPATION: Primary | ICD-10-CM

## 2024-07-01 DIAGNOSIS — E66.01 CLASS 3 SEVERE OBESITY DUE TO EXCESS CALORIES WITH BODY MASS INDEX (BMI) OF 50.0 TO 59.9 IN ADULT, UNSPECIFIED WHETHER SERIOUS COMORBIDITY PRESENT: ICD-10-CM

## 2024-07-01 PROCEDURE — 3008F BODY MASS INDEX DOCD: CPT | Mod: CPTII,,, | Performed by: NURSE PRACTITIONER

## 2024-07-01 PROCEDURE — 99214 OFFICE O/P EST MOD 30 MIN: CPT | Mod: PBBFAC,PN | Performed by: NURSE PRACTITIONER

## 2024-07-01 PROCEDURE — 99999 PR PBB SHADOW E&M-EST. PATIENT-LVL IV: CPT | Mod: PBBFAC,,, | Performed by: NURSE PRACTITIONER

## 2024-07-01 PROCEDURE — 1159F MED LIST DOCD IN RCRD: CPT | Mod: CPTII,,, | Performed by: NURSE PRACTITIONER

## 2024-07-01 PROCEDURE — 3074F SYST BP LT 130 MM HG: CPT | Mod: CPTII,,, | Performed by: NURSE PRACTITIONER

## 2024-07-01 PROCEDURE — 99214 OFFICE O/P EST MOD 30 MIN: CPT | Mod: S$PBB,,, | Performed by: NURSE PRACTITIONER

## 2024-07-01 PROCEDURE — 3079F DIAST BP 80-89 MM HG: CPT | Mod: CPTII,,, | Performed by: NURSE PRACTITIONER

## 2024-07-01 PROCEDURE — 1160F RVW MEDS BY RX/DR IN RCRD: CPT | Mod: CPTII,,, | Performed by: NURSE PRACTITIONER

## 2024-07-01 RX ORDER — POLYETHYLENE GLYCOL 3350 17 G/17G
17 POWDER, FOR SOLUTION ORAL DAILY
Qty: 510 G | Refills: 11 | Status: SHIPPED | OUTPATIENT
Start: 2024-07-01

## 2024-07-01 NOTE — PROGRESS NOTES
Subjective:       Patient ID: Felicity Cohen is a 24 y.o. female.    Chief Complaint: stool changes ((Mucus in stool))    23 y/o female with hx of GERD and constipation presents to clinic for follow up. Last seen in GI clinic 2022. Today she reports constipation.    Constipation  This is a chronic problem. The current episode started more than 1 year ago. The problem has been waxing and waning since onset. Her stool frequency is 2 to 3 times per week. The stool is described as firm and pellet like. The patient is not on a high fiber diet. She Does not exercise regularly. There has Not been adequate water intake. Associated symptoms include bloating. Pertinent negatives include no abdominal pain, back pain, diarrhea, fecal incontinence, hematochezia, hemorrhoids, melena, nausea, vomiting or weight loss. Associated symptoms comments: +mucus. Risk factors include obesity. She has tried nothing for the symptoms.       Past Medical History:   Diagnosis Date    ADHD (attention deficit hyperactivity disorder)     Anxiety     Constipation     Depression     GERD (gastroesophageal reflux disease)     IBS (irritable bowel syndrome)     Ovarian cyst     Schizophrenia in children        Past Surgical History:   Procedure Laterality Date    ESOPHAGOGASTRODUODENOSCOPY N/A 2/8/2022    Procedure: EGD (ESOPHAGOGASTRODUODENOSCOPY);  Surgeon: Berkley Mckenna MD;  Location: [facility];  Service: Endoscopy;  Laterality: N/A;    WISDOM TOOTH EXTRACTION         Family History   Problem Relation Name Age of Onset    Asthma Mother      Diabetes Father         Social History     Socioeconomic History    Marital status: Single   Tobacco Use    Smoking status: Never     Passive exposure: Yes    Smokeless tobacco: Never   Substance and Sexual Activity    Alcohol use: No    Drug use: Never    Sexual activity: Yes     Partners: Male       Review of Systems   Constitutional:  Negative for appetite change, unexpected weight change and weight  "loss.   HENT:  Negative for trouble swallowing.    Respiratory:  Negative for shortness of breath.    Cardiovascular:  Negative for chest pain.   Gastrointestinal:  Positive for bloating and constipation. Negative for abdominal pain, diarrhea, hematochezia, hemorrhoids, melena, nausea and vomiting.   Musculoskeletal:  Negative for back pain.   Hematological:  Negative for adenopathy. Does not bruise/bleed easily.   Psychiatric/Behavioral:  Negative for dysphoric mood.          Objective:     Vitals:    07/01/24 1006   BP: 129/87   BP Location: Left arm   Patient Position: Sitting   BP Method: Large (Automatic)   Pulse: 90   Weight: (!) 154.2 kg (340 lb)   Height: 5' 5" (1.651 m)          Physical Exam  Constitutional:       General: She is not in acute distress.     Appearance: She is obese.   HENT:      Head: Normocephalic.   Eyes:      Conjunctiva/sclera: Conjunctivae normal.   Pulmonary:      Effort: Pulmonary effort is normal. No respiratory distress.   Musculoskeletal:         General: Normal range of motion.      Cervical back: Normal range of motion.   Skin:     General: Skin is warm and dry.   Neurological:      Mental Status: She is alert and oriented to person, place, and time.   Psychiatric:         Mood and Affect: Mood normal.         Behavior: Behavior normal.     Xray of abdomen was independently visualized and reviewed by me and showed moderate amount of stool throughout the colon.            Assessment:         ICD-10-CM ICD-9-CM   1. Chronic idiopathic constipation  K59.04 564.00   2. Class 3 severe obesity due to excess calories with body mass index (BMI) of 50.0 to 59.9 in adult, unspecified whether serious comorbidity present  E66.01 278.01    Z68.43 V85.43       Plan:         Chronic idiopathic constipation  -     X-Ray Abdomen Flat And Erect; Future; Expected date: 07/01/2024  -     Resume Miralax and fiber supplement daily  -     polyethylene glycol (GLYCOLAX) 17 gram/dose powder; Take 17 g " by mouth once daily.  Dispense: 510 g; Refill: 11    Class 3 severe obesity due to excess calories with body mass index (BMI) of 50.0 to 59.9 in adult, unspecified whether serious comorbidity present         -    Weight loss advised. Dietary and          exercise counseling done.    Follow up if symptoms worsen or fail to improve.     Patient's Medications   New Prescriptions    No medications on file   Previous Medications    ACETAMINOPHEN (TYLENOL) 500 MG TABLET    Take 2 tablets (1,000 mg total) by mouth every 6 (six) hours as needed for Pain or Temperature greater than (100.4).    ALBUTEROL (PROVENTIL/VENTOLIN HFA) 90 MCG/ACTUATION INHALER    Inhale 2 puffs into the lungs every 6 (six) hours as needed for Wheezing. Rescue    BIOTIN 1 MG TABLET    Take 1,000 mcg by mouth twice a week.    ERGOCALCIFEROL (ERGOCALCIFEROL) 50,000 UNIT CAP    TAKE ONE CAPSULE BY MOUTH EVERY 7 DAYS    ERGOCALCIFEROL (ERGOCALCIFEROL) 50,000 UNIT CAP    Take 1 capsule (50,000 Units total) by mouth every 7 days.    FAMOTIDINE (PEPCID) 20 MG TABLET    Take 1 tablet (20 mg total) by mouth 2 (two) times daily.    LACTOBACILLUS RHAMNOSUS GG (CULTURELLE) 10 BILLION CELL CAPSULE    Take 1 capsule by mouth once daily.    OMEGA-3/DHA/EPA/ALA/VITAMIN D3 (OMEGA-3-DHA-EPA-ALA-VIT D3 ORAL)    Take by mouth.    OMEPRAZOLE (PRILOSEC) 40 MG CAPSULE    TAKE 1 CAPSULE(40 MG) BY MOUTH EVERY DAY    PHENOL (THROAT SPRAY) 1.4 % SPRA    by Mucous Membrane route every 2 (two) hours as needed (sore throat).    TRIAMCINOLONE ACETONIDE 0.1% (KENALOG) 0.1 % OINTMENT    Apply topically 2 (two) times daily. To affected area   Modified Medications    Modified Medication Previous Medication    POLYETHYLENE GLYCOL (GLYCOLAX) 17 GRAM/DOSE POWDER polyethylene glycol (GLYCOLAX) 17 gram/dose powder       Take 17 g by mouth once daily.    Take 17 g by mouth once daily.   Discontinued Medications    No medications on file

## 2024-07-03 ENCOUNTER — HOSPITAL ENCOUNTER (EMERGENCY)
Facility: HOSPITAL | Age: 24
Discharge: HOME | End: 2024-07-03
Attending: EMERGENCY MEDICINE
Payer: MEDICAID

## 2024-07-03 VITALS
BODY MASS INDEX: 48.82 KG/M2 | HEART RATE: 96 BPM | OXYGEN SATURATION: 99 % | TEMPERATURE: 97 F | RESPIRATION RATE: 18 BRPM | HEIGHT: 65 IN | SYSTOLIC BLOOD PRESSURE: 132 MMHG | DIASTOLIC BLOOD PRESSURE: 80 MMHG | WEIGHT: 293 LBS

## 2024-07-03 DIAGNOSIS — R10.2 SUPRAPUBIC ABDOMINAL PAIN: Primary | ICD-10-CM

## 2024-07-03 DIAGNOSIS — N30.00 ACUTE CYSTITIS WITHOUT HEMATURIA: ICD-10-CM

## 2024-07-03 LAB
B-HCG UR QL: NEGATIVE
BILIRUBIN, POC UA: NEGATIVE
BLOOD, POC UA: NEGATIVE
CLARITY, POC UA: CLEAR
COLOR, POC UA: YELLOW
CTP QC/QA: YES
GLUCOSE, POC UA: NEGATIVE
KETONES, POC UA: NEGATIVE
LEUKOCYTE EST, POC UA: ABNORMAL
NITRITE, POC UA: NEGATIVE
PH UR STRIP: 5.5 [PH]
PROTEIN, POC UA: NEGATIVE
SPECIFIC GRAVITY, POC UA: >=1.03
UROBILINOGEN, POC UA: 0.2 E.U./DL

## 2024-07-03 PROCEDURE — 81025 URINE PREGNANCY TEST: CPT | Mod: ER | Performed by: EMERGENCY MEDICINE

## 2024-07-03 PROCEDURE — 99284 EMERGENCY DEPT VISIT MOD MDM: CPT | Mod: 25,ER

## 2024-07-03 PROCEDURE — 87086 URINE CULTURE/COLONY COUNT: CPT | Performed by: EMERGENCY MEDICINE

## 2024-07-03 RX ORDER — SYRING-NEEDL,DISP,INSUL,0.3 ML 29 G X1/2"
296 SYRINGE, EMPTY DISPOSABLE MISCELLANEOUS ONCE
Qty: 296 ML | Refills: 0 | Status: SHIPPED | OUTPATIENT
Start: 2024-07-03 | End: 2024-07-03

## 2024-07-03 RX ORDER — NITROFURANTOIN 25; 75 MG/1; MG/1
100 CAPSULE ORAL 2 TIMES DAILY
Qty: 10 CAPSULE | Refills: 0 | Status: SHIPPED | OUTPATIENT
Start: 2024-07-03 | End: 2024-07-08

## 2024-07-03 NOTE — ED PROVIDER NOTES
"Encounter Date: 7/3/2024       History     Chief Complaint   Patient presents with    Abdominal Pain     Lower abd pain for several days. States pain worse on right side radiating to central abd. Pain worse when bladder is full but denies any dysuria/hematuria. Denies N/V.     24 y.o. female w with GERD, anxiety, ovarian cysts, and others presents to the emergency department complaining of acute, intermittent, suprapubic, "aching" abdominal pain that began three days ago she states pain is worse with sitting down.  She endorses nausea but denies vomiting, diarrhea, dysuria, melena, bright red blood per rectum, frequency, hematuria, oliguria, appetite change, exacerbation of pain with meals, back pain, vaginal discharge shortness of breath, chest pain or fever.  She reports taking over-the-counter Midol twice daily over the last two days without improvement.  Denies any other acute complain  Last menstrual period 2024  Denies prior abdominal surgery.  [id number]    The history is provided by the patient.     Review of patient's allergies indicates:   Allergen Reactions    Ibuprofen Hives     Past Medical History:   Diagnosis Date    ADHD (attention deficit hyperactivity disorder)     Anxiety     Constipation     Depression     GERD (gastroesophageal reflux disease)     IBS (irritable bowel syndrome)     Ovarian cyst     Schizophrenia in children      Past Surgical History:   Procedure Laterality Date    ESOPHAGOGASTRODUODENOSCOPY N/A 2022    Procedure: EGD (ESOPHAGOGASTRODUODENOSCOPY);  Surgeon: Berkley Mckenna MD;  Location: [facility];  Service: Endoscopy;  Laterality: N/A;    WISDOM TOOTH EXTRACTION       Family History   Problem Relation Name Age of Onset    Asthma Mother      Diabetes Father       Social History     Tobacco Use    Smoking status: Never     Passive exposure: Yes    Smokeless tobacco: Never   Substance Use Topics    Alcohol use: No    Drug use: Never     Review of Systems "   Constitutional:  Negative for activity change, appetite change and fever.   Respiratory:  Negative for cough and shortness of breath.    Cardiovascular:  Negative for chest pain.   Gastrointestinal:  Negative for abdominal pain, blood in stool, constipation, diarrhea, nausea and vomiting.   Genitourinary:  Negative for decreased urine volume, difficulty urinating, dysuria, frequency, hematuria, menstrual problem, vaginal bleeding and vaginal discharge.       Physical Exam     Initial Vitals [07/03/24 0114]   BP Pulse Resp Temp SpO2   136/87 92 18 97.4 °F (36.3 °C) 98 %      MAP       --         Physical Exam    Nursing note and vitals reviewed.  Constitutional: She appears well-developed and well-nourished. She is not diaphoretic. She is Obese . She is cooperative.  Non-toxic appearance. No distress.   HENT:   Head: Normocephalic and atraumatic.   Eyes: Conjunctivae are normal. Pupils are equal, round, and reactive to light.   Neck: Neck supple.   Normal range of motion.  Cardiovascular:  Normal rate and intact distal pulses.           Pulmonary/Chest: No accessory muscle usage. No tachypnea. No respiratory distress.   Abdominal: Abdomen is soft. Bowel sounds are normal. She exhibits no distension. There is abdominal tenderness in the right lower quadrant and suprapubic area.   No right CVA tenderness.  No left CVA tenderness. There is no rebound and no guarding.   Musculoskeletal:         General: No tenderness. Normal range of motion.      Cervical back: Normal range of motion and neck supple.     Neurological: She is alert and oriented to person, place, and time. She has normal strength. Gait normal. GCS eye subscore is 4. GCS verbal subscore is 5. GCS motor subscore is 6.   Skin: Skin is warm. Capillary refill takes less than 2 seconds.   Psychiatric: She has a normal mood and affect.         ED Course   Procedures  Labs Reviewed   POCT URINALYSIS W/O SCOPE - Abnormal; Notable for the following components:        Result Value    Spec Grav UA >=1.030 (*)     Leukocytes, UA Trace (*)     All other components within normal limits   CULTURE, URINE    Narrative:     Indicated criteria for high risk culture:->Other  Other (specify):->UTI   POCT URINALYSIS W/O SCOPE   POCT URINE PREGNANCY          Imaging Results              CT Renal Stone Study ABD Pelvis WO (Final result)  Result time 07/03/24 06:40:36      Final result by RADIOLOGISTDENIS (07/03/24 06:40:36)                   Impression:      No acute findings.      Electronically signed by: Denis Radiologist  Date:    07/03/2024  Time:    06:40               Narrative:    EXAMINATION:  Exam: CT Abdomen And Pelvis Without Contrast Exam date and time: 7/3/2024 2:34 AM    CLINICAL HISTORY:  Age: 24 years old Clinical indication: Abdominal pain; Localized; Lower    TECHNIQUE:  Imaging protocol: Computed tomography of the abdomen and pelvis without contrast. Radiation optimization: All CT scans at this facility use at least one of these dose optimization techniques: automated exposure control; mA and/or kV adjustment per patient size (includes targeted exams where dose is matched to clinical indication); or iterative reconstruction.    COMPARISON:  CT RENAL STONE STUDY ABD PELVIS WO 11/16/2022 10:17 PM    FINDINGS:  Liver: Unremarkable. Gallbladder and biliary ducts: No calcified stones. No ductal dilation. Pancreas: No ductal dilation. Spleen: No splenomegaly. Adrenal glands: Normal. No mass. Kidneys and ureters: No hydronephrosis. Stomach and bowel: No obstruction. No mucosal thickening. Appendix: No evidence of appendicitis. Intraperitoneal space: No free air. No significant fluid collection. Vasculature: No abdominal aortic aneurysm. Lymph nodes: No enlarged lymph nodes. Urinary bladder: Unremarkable as visualized. Reproductive: Unremarkable as visualized. Bones/joints: Unremarkable. No acute fracture. Soft tissues: Unremarkable.                                        Medications - No data to display  Medical Decision Making  Amount and/or Complexity of Data Reviewed  Labs: ordered.  Radiology: ordered.    Risk  OTC drugs.  Prescription drug management.                          Medical Decision Making:   Differential Diagnosis:   Diverticulitis, urinary tract infection, pyelonephritis, ureterolithiasis, renal colic, interstitial cystitis, peritonitis, small-bowel obstruction, colitis, ovarian torsion, ruptured ovarian cyst, PID, tubo-ovarian abscess, cervicitis, others   Clinical Tests:   Lab Tests: Reviewed and Ordered  Radiological Study: Ordered and Reviewed  ED Management:  UA with trace leukocyte esterase. Urine culture pending at time of ED disposition. CT abd/pelvis negative for acute abnormality. Patient treated empiricallly for UTI. Test results, imaging results, outpatient management plan, outpatient PCP follow up and ED return precautions discussed with patient with understanding and agreement.        Labs Reviewed    Admission on 07/03/2024, Discharged on 07/03/2024   Component Date Value Ref Range Status    POC Preg Test, Ur 07/03/2024 Negative  Negative Final     Acceptable 07/03/2024 Yes   Final    Glucose, UA 07/03/2024 Negative   Final    Bilirubin, UA 07/03/2024 Negative   Final    Ketones, UA 07/03/2024 Negative   Final    Spec Grav UA 07/03/2024 >=1.030 (>)   Final    Blood, UA 07/03/2024 Negative   Final    PH, UA 07/03/2024 5.5   Final    Protein, UA 07/03/2024 Negative   Final    Urobilinogen, UA 07/03/2024 0.2  E.U./dL Final    Nitrite, UA 07/03/2024 Negative   Final    Leukocytes, UA 07/03/2024 Trace (A)   Final    Color, UA 07/03/2024 Yellow   Final    Clarity, UA 07/03/2024 Clear   Final    Urine Culture, Routine 07/03/2024 No significant growth   Final        Imaging Reviewed    Imaging Results              CT Renal Stone Study ABD Pelvis WO (Final result)  Result time 07/03/24 06:40:36      Final result by RADIOLOGIST, VIRTUAL  (07/03/24 06:40:36)                   Impression:      No acute findings.      Electronically signed by: Irma Radiologist  Date:    07/03/2024  Time:    06:40               Narrative:    EXAMINATION:  Exam: CT Abdomen And Pelvis Without Contrast Exam date and time: 7/3/2024 2:34 AM    CLINICAL HISTORY:  Age: 24 years old Clinical indication: Abdominal pain; Localized; Lower    TECHNIQUE:  Imaging protocol: Computed tomography of the abdomen and pelvis without contrast. Radiation optimization: All CT scans at this facility use at least one of these dose optimization techniques: automated exposure control; mA and/or kV adjustment per patient size (includes targeted exams where dose is matched to clinical indication); or iterative reconstruction.    COMPARISON:  CT RENAL STONE STUDY ABD PELVIS WO 11/16/2022 10:17 PM    FINDINGS:  Liver: Unremarkable. Gallbladder and biliary ducts: No calcified stones. No ductal dilation. Pancreas: No ductal dilation. Spleen: No splenomegaly. Adrenal glands: Normal. No mass. Kidneys and ureters: No hydronephrosis. Stomach and bowel: No obstruction. No mucosal thickening. Appendix: No evidence of appendicitis. Intraperitoneal space: No free air. No significant fluid collection. Vasculature: No abdominal aortic aneurysm. Lymph nodes: No enlarged lymph nodes. Urinary bladder: Unremarkable as visualized. Reproductive: Unremarkable as visualized. Bones/joints: Unremarkable. No acute fracture. Soft tissues: Unremarkable.                                      Medications given in ED    Medications - No data to display    Note was created using voice recognition software. Note may have occasional typographical errors that may not have been identified and edited despite good chito initial review prior to signing.          Clinical Impression:  Final diagnoses:  [R10.2] Suprapubic abdominal pain (Primary)  [N30.00] Acute cystitis without hematuria          ED Disposition Condition    Discharge  Stable          ED Prescriptions       Medication Sig Dispense Start Date End Date Auth. Provider    nitrofurantoin, macrocrystal-monohydrate, (MACROBID) 100 MG capsule () Take 1 capsule (100 mg total) by mouth 2 (two) times daily. for 5 days 10 capsule 7/3/2024 2024 Lobo Cordova MD    magnesium citrate solution () Take 296 mLs by mouth once. for 1 dose 296 mL 7/3/2024 7/3/2024 Lobo Cordova MD          Follow-up Information       Follow up With Specialties Details Why Contact Info    The nearest emergency department.  Go to  As needed, If symptoms worsen     Your PCP  Call  As needed, for ongoing care              Lobo Cordova MD  24 [phone]

## 2024-07-03 NOTE — Clinical Note
"Felicity Banerjee (Deranika)er was seen and treated in our emergency department on 7/3/2024.  She may return to work on 07/04/2024.       If you have any questions or concerns, please don't hesitate to call.       RN    "

## 2024-07-05 LAB — BACTERIA UR CULT: NORMAL

## 2024-07-10 ENCOUNTER — HOSPITAL ENCOUNTER (EMERGENCY)
Facility: HOSPITAL | Age: 24
Discharge: HOME | End: 2024-07-10
Attending: EMERGENCY MEDICINE
Payer: MEDICAID

## 2024-07-10 VITALS
DIASTOLIC BLOOD PRESSURE: 71 MMHG | SYSTOLIC BLOOD PRESSURE: 123 MMHG | RESPIRATION RATE: 20 BRPM | OXYGEN SATURATION: 98 % | BODY MASS INDEX: 48.82 KG/M2 | HEART RATE: 89 BPM | WEIGHT: 293 LBS | TEMPERATURE: 99 F | HEIGHT: 65 IN

## 2024-07-10 DIAGNOSIS — R30.0 DYSURIA: Primary | ICD-10-CM

## 2024-07-10 LAB
B-HCG UR QL: NEGATIVE
BACTERIA #/AREA URNS HPF: ABNORMAL /HPF
BACTERIA GENITAL QL WET PREP: ABNORMAL
BILIRUB UR QL STRIP: NEGATIVE
CLARITY UR: CLEAR
CLUE CELLS VAG QL WET PREP: ABNORMAL
COLOR UR: YELLOW
CTP QC/QA: YES
FILAMENT FUNGI VAG WET PREP-#/AREA: ABNORMAL
GLUCOSE UR QL STRIP: NEGATIVE
HGB UR QL STRIP: NEGATIVE
KETONES UR QL STRIP: NEGATIVE
LEUKOCYTE ESTERASE UR QL STRIP: ABNORMAL
MICROSCOPIC COMMENT: ABNORMAL
NITRITE UR QL STRIP: NEGATIVE
PH UR STRIP: 6 [PH] (ref 5–8)
PROT UR QL STRIP: NEGATIVE
SP GR UR STRIP: 1.01 (ref 1–1.03)
SPECIMEN SOURCE: ABNORMAL
SQUAMOUS #/AREA URNS HPF: 3 /HPF
T VAGINALIS GENITAL QL WET PREP: ABNORMAL
URN SPEC COLLECT METH UR: ABNORMAL
UROBILINOGEN UR STRIP-ACNC: NEGATIVE EU/DL
WBC #/AREA URNS HPF: 2 /HPF (ref 0–5)
WBC #/AREA VAG WET PREP: ABNORMAL
YEAST GENITAL QL WET PREP: ABNORMAL

## 2024-07-10 PROCEDURE — 81025 URINE PREGNANCY TEST: CPT

## 2024-07-10 PROCEDURE — 81000 URINALYSIS NONAUTO W/SCOPE: CPT

## 2024-07-10 PROCEDURE — 87591 N.GONORRHOEAE DNA AMP PROB: CPT

## 2024-07-10 PROCEDURE — 87210 SMEAR WET MOUNT SALINE/INK: CPT | Performed by: PHYSICIAN ASSISTANT

## 2024-07-10 PROCEDURE — 87086 URINE CULTURE/COLONY COUNT: CPT

## 2024-07-10 PROCEDURE — 99283 EMERGENCY DEPT VISIT LOW MDM: CPT

## 2024-07-10 RX ORDER — PHENAZOPYRIDINE HYDROCHLORIDE 200 MG/1
200 TABLET, FILM COATED ORAL 3 TIMES DAILY
Qty: 15 TABLET | Refills: 0 | Status: SHIPPED | OUTPATIENT
Start: 2024-07-10 | End: 2024-07-15

## 2024-07-10 NOTE — ED PROVIDER NOTES
Encounter Date: 7/10/2024       History     Chief Complaint   Patient presents with    Dysuria     Pt c/o pain when urinating x1 week.      A 24 year old female with no past medical history presents to the ED complaining of dysuria for the past week. She visited the ED on 7/3/24 where she was prescribed Macrobid. She has been taking it 2x per day as prescribed. She states her symptoms have improved but have not resolved completely. Urine culture from that day later resulted negative for any bacterial growth. Today she also complains of white, cloudy vaginal discharge that started 3 days ago. She is not currently sexually active and states she is not concerned for STDs. Denies vaginal bleeding, vaginal itching, vaginal ulcers/lesions, hematuria, diarrhea, constipation, N/V, CP, SOB, syncope, fever. Allergic to ibuprofen.    The history is provided by the patient. No  was used.     Review of patient's allergies indicates:   Allergen Reactions    Ibuprofen Hives     Past Medical History:   Diagnosis Date    ADHD (attention deficit hyperactivity disorder)     Anxiety     Constipation     Depression     GERD (gastroesophageal reflux disease)     IBS (irritable bowel syndrome)     Ovarian cyst     Schizophrenia in children      Past Surgical History:   Procedure Laterality Date    ESOPHAGOGASTRODUODENOSCOPY N/A 2/8/2022    Procedure: EGD (ESOPHAGOGASTRODUODENOSCOPY);  Surgeon: Berkley Mckenna MD;  Location: [facility];  Service: Endoscopy;  Laterality: N/A;    WISDOM TOOTH EXTRACTION       Family History   Problem Relation Name Age of Onset    Asthma Mother      Diabetes Father       Social History     Tobacco Use    Smoking status: Never     Passive exposure: Yes    Smokeless tobacco: Never   Substance Use Topics    Alcohol use: No    Drug use: Never     Review of Systems   Constitutional:  Negative for chills, fatigue and fever.   HENT:  Negative for congestion, sneezing and sore throat.    Eyes:   Negative for visual disturbance.   Respiratory:  Negative for cough and shortness of breath.    Cardiovascular:  Negative for chest pain.   Gastrointestinal:  Negative for abdominal distention, abdominal pain, constipation, diarrhea, nausea and vomiting.   Genitourinary:  Positive for dysuria, pelvic pain (R sided) and vaginal discharge. Negative for flank pain, frequency, genital sores, hematuria, vaginal bleeding and vaginal pain.   Musculoskeletal:  Negative for back pain.   Skin:  Negative for rash.   Neurological:  Negative for syncope and weakness.   Hematological:  Does not bruise/bleed easily.       Physical Exam     Initial Vitals [07/10/24 1720]   BP Pulse Resp Temp SpO2   138/72 100 20 98.8 °F (37.1 °C) 99 %      MAP       --         Physical Exam    Nursing note and vitals reviewed.  Constitutional: She appears well-developed and well-nourished. She is not diaphoretic. No distress.   HENT:   Head: Normocephalic and atraumatic.   Right Ear: External ear normal.   Left Ear: External ear normal.   Eyes: Conjunctivae are normal.   Neck:   Normal range of motion.  Cardiovascular:  Normal rate, regular rhythm and intact distal pulses.           Pulmonary/Chest: Breath sounds normal. No respiratory distress.   Abdominal: Abdomen is soft. Bowel sounds are normal. She exhibits no distension. There is abdominal tenderness in the suprapubic area.   No right CVA tenderness.  No left CVA tenderness. There is no rebound, no guarding, no tenderness at McBurney's point and negative Swenson's sign.   Genitourinary:    Genitourinary Comments: Pain upon palpation of R pelvic area.      Musculoskeletal:      Cervical back: Normal range of motion.     Neurological: She is alert and oriented to person, place, and time. GCS score is 15. GCS eye subscore is 4. GCS verbal subscore is 5. GCS motor subscore is 6.   Skin: Skin is warm and dry.   Psychiatric: She has a normal mood and affect. Her behavior is normal.         ED Course    Procedures  Labs Reviewed   VAGINAL SCREEN - Abnormal; Notable for the following components:       Result Value    Bacteria - Vaginal Screen Few (*)     All other components within normal limits    Narrative:     Can self swab  Release to patient->Immediate   URINALYSIS, REFLEX TO URINE CULTURE - Abnormal; Notable for the following components:    Leukocytes, UA Trace (*)     All other components within normal limits    Narrative:     Specimen Source->Urine   URINALYSIS MICROSCOPIC - Abnormal; Notable for the following components:    Bacteria Few (*)     All other components within normal limits    Narrative:     Specimen Source->Urine   CULTURE, URINE   CULTURE, URINE   C. TRACHOMATIS/N. GONORRHOEAE BY AMP DNA   POCT URINE PREGNANCY          Imaging Results    None          Medications - No data to display  Medical Decision Making  Encounter Date: 7/10/2024    A 24 year old female with no past medical history presents to the ED complaining of dysuria for the past week. She visited the ED on 7/3/24 where she was prescribed Macrobid. She has been taking it 2x per day as prescribed. She states her symptoms have improved but have not resolved completely. Urine culture from that day later resulted negative for any bacterial growth. Today she also complains of white, cloudy vaginal discharge that started 3 days ago. She is not currently sexually active and states she is not concerned for STDs. Denies vaginal bleeding, vaginal itching, vaginal ulcers/lesions, hematuria, diarrhea, constipation, N/V, CP, SOB, syncope, fever. Allergic to ibuprofen..  Hemodynamically stable. Afebrile. Phonating and protecting the airway spontaneously. No clinical evidence for cardiovascular instability or impending airway compromise.  Remainder of physical exam as above. Notable for mild suprapubic and right pelvic tenderness to palpation.  Remainder of abdomen is soft and nontender with no guarding, distention or rebound tenderness.   Patient offered pelvic exam but declined, stated she was to self swab.  Patient reports she has seen her OBGYN for pelvic pain in the past, and he advised her that he does not wish to order a pelvic ultrasound at this time.  Prior medical records reviewed. PMD note reviewed. Current co-morbidities considered that will impact clinical decision making include as above.   Vitals range:   Temp:  [98.7 °F (37.1 °C)-98.8 °F (37.1 °C)] 98.7 °F (37.1 °C)  Pulse:  [] 89  Resp:  [20] 20  SpO2:  [98 %-99 %] 98 %  BP: (123-138)/(71-72) 123/71    Differential diagnoses includes but is not limited to:   UTI, interstitial cystitis, vaginitis    ED workup initiated with urinalysis, vaginal swab, and gonorrhea/chlamydia culture.  UA is not overly concerning for UTI.  Culture pending.  Vaginal swab negative for yeast, BV and Trichomonas.  Gonorrhea/chlamydia culture is currently pending.    Clinical picture today most consistent with dysuria.   Doubt alternate pathology as listed in the differential above.  Given her last urine culture from last week resulted negative and she was recently treated with antibiotics, I recommend to hold on any further antibiotics at this time pending culture results.  We discussed a plan to start her on Pyridium for symptomatic relief of dysuria and provide her with patient education material regarding bladder irritants to avoid.  I will place an ambulatory referral to Urology for further evaluation.  Patient voiced understanding and is agreeable with this plan.    ED MEDS GIVEN:  Medications - No data to display    Clinical Impression:  Final diagnoses:  [R30.0] Dysuria (Primary)         I see no indication of an emergent process beyond that addressed during our encounter but have duly counseled the patient/family regarding the need for prompt follow-up as well as the indications that should prompt immediate return to the emergency room should new or worrisome developments occur.  The  patient/family has been provided with verbal and printed direction regarding our final diagnosis(es) as well as instructions regarding use of OTC and/or Rx medications intended to manage the patient's conditions.     The patient/family communicates understanding of all this information and all remaining questions and concerns were addressed at this time.    DISCLAIMER: This note was prepared with [employer] voice recognition transcription software. Garbled syntax, mangled pronouns, and other bizarre constructions may be attributed to that software system.      Risk  Prescription drug management.                                      Clinical Impression:  Final diagnoses:  [R30.0] Dysuria (Primary)          ED Disposition Condition    Discharge Stable          ED Prescriptions       Medication Sig Dispense Start Date End Date Auth. Provider    phenazopyridine (PYRIDIUM) 200 MG tablet Take 1 tablet (200 mg total) by mouth 3 (three) times daily. for 5 days 15 tablet 7/10/2024 7/15/2024 Rosalind Purdy PA-C          Follow-up Information       Follow up With Specialties Details Why Contact Info    Armando Pereyra MD Urology Schedule an appointment as soon as possible for a visit in 1 week For follow up [address]   [facility] [address]  [phone]      [facility] Emergency Medicine Go to  As needed, If symptoms worsen [id number]  [facility] [place] [address]  [phone]             Rosalind Purdy PA-C  07/10/24 [id number]

## 2024-07-11 NOTE — ED TRIAGE NOTES
Felicity Banerjeeer, a 24 y.o. female presents to the ED w/ complaint of pain when urinating and pelvic pain x 1 week. Reports recently being diagnosed with UTI. States she finished her course of abx but still having same symptoms. Denies any other complaints.

## 2024-07-11 NOTE — DISCHARGE INSTRUCTIONS
Please take the prescribed medications according to the directions on the bottle.  Please note that this medication may cause your urine to turn orange in color.  Read the patient education material regarding bladder irritants.  Our facility will contact you if your culture results positive for an infection.  If your symptoms worsen you may return to the ED for reevaluation. Otherwise, please follow up with the urology clinic listed below for further evaluation into your symptoms.

## 2024-07-12 LAB — BACTERIA UR CULT: NORMAL

## 2024-07-13 LAB
C TRACH DNA SPEC QL NAA+PROBE: NOT DETECTED
N GONORRHOEA DNA SPEC QL NAA+PROBE: NOT DETECTED

## 2024-08-16 ENCOUNTER — HOSPITAL ENCOUNTER (EMERGENCY)
Facility: HOSPITAL | Age: 24
Discharge: HOME | End: 2024-08-16
Attending: EMERGENCY MEDICINE
Payer: MEDICAID

## 2024-08-16 VITALS
RESPIRATION RATE: 16 BRPM | TEMPERATURE: 98 F | SYSTOLIC BLOOD PRESSURE: 140 MMHG | OXYGEN SATURATION: 98 % | BODY MASS INDEX: 48.82 KG/M2 | HEIGHT: 65 IN | HEART RATE: 89 BPM | WEIGHT: 293 LBS | DIASTOLIC BLOOD PRESSURE: 85 MMHG

## 2024-08-16 DIAGNOSIS — S61.412A LACERATION OF LEFT HAND WITHOUT FOREIGN BODY, INITIAL ENCOUNTER: Primary | ICD-10-CM

## 2024-08-16 LAB
BILIRUBIN, POC UA: NEGATIVE
BLOOD, POC UA: ABNORMAL
CLARITY, UA POC: CLEAR
COLOR, UA POC: ABNORMAL
GLUCOSE, POC UA: NEGATIVE
KETONES, POC UA: NEGATIVE
LEUKOCYTE EST, POC UA: NEGATIVE
NITRITE, POC UA: NEGATIVE
PH UR STRIP: 5.5 [PH]
PROTEIN, POC UA: NEGATIVE
SPECIFIC GRAVITY, POC UA: >=1.03
UROBILINOGEN, POC UA: 0.2 E.U./DL

## 2024-08-16 PROCEDURE — 25000003 PHARM REV CODE 250: Mod: ER

## 2024-08-16 PROCEDURE — 12002 RPR S/N/AX/GEN/TRNK2.6-7.5CM: CPT | Mod: ER

## 2024-08-16 PROCEDURE — 99283 EMERGENCY DEPT VISIT LOW MDM: CPT | Mod: 25,ER

## 2024-08-16 RX ORDER — MUPIROCIN 20 MG/G
OINTMENT TOPICAL 3 TIMES DAILY
Qty: 15 G | Refills: 0 | Status: SHIPPED | OUTPATIENT
Start: 2024-08-16

## 2024-08-16 RX ORDER — LIDOCAINE HYDROCHLORIDE 10 MG/ML
10 INJECTION, SOLUTION INFILTRATION; PERINEURAL
Status: COMPLETED | OUTPATIENT
Start: 2024-08-16 | End: 2024-08-16

## 2024-08-16 RX ADMIN — LIDOCAINE HYDROCHLORIDE 10 ML: 10 INJECTION, SOLUTION INFILTRATION; PERINEURAL at 04:08

## 2024-08-16 NOTE — Clinical Note
"Felicity Man" Noel was seen and treated in our emergency department on 8/16/2024.  She may return to school on 08/19/2024.      If you have any questions or concerns, please don't hesitate to call.      Srinath Nolan PA-C"

## 2024-08-16 NOTE — ED PROVIDER NOTES
Encounter Date: 8/16/2024    SCRIBE #1 NOTE: I, Abelardo Hamilton, am scribing for, and in the presence of,  Srinath Nolan PA-C. I have scribed the following portions of the note - Other sections scribed: HPI,ROS,PE.       History     Chief Complaint   Patient presents with    Laceration     Laceration to 2nd and 3rd finger of left hand      Patient is a 24 y.o. female with a past medical history of GERD, IBS who presents to the Emergency Department for evaluation of  laceration to ring and middle finger of left hand x PTA.  She reports attempting to open a package while cooking and accidentally cutting her hand in the process with a new knife. She describes the pain as aching/burning and has not taken any medications for the symptoms. 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 She denies fever.  Denies nausea or vomiting.  Denies numbness or tingling.      The history is provided by the patient. No  was used.     Review of patient's allergies indicates:   Allergen Reactions    Ibuprofen Hives     Past Medical History:   Diagnosis Date    ADHD (attention deficit hyperactivity disorder)     Anxiety     Constipation     Depression     GERD (gastroesophageal reflux disease)     IBS (irritable bowel syndrome)     Ovarian cyst     Schizophrenia in children      Past Surgical History:   Procedure Laterality Date    ESOPHAGOGASTRODUODENOSCOPY N/A 2/8/2022    Procedure: EGD (ESOPHAGOGASTRODUODENOSCOPY);  Surgeon: Berkley Mckenna MD;  Location: [facility];  Service: Endoscopy;  Laterality: N/A;    WISDOM TOOTH EXTRACTION       Family History   Problem Relation Name Age of Onset    Asthma Mother      Diabetes Father       Social History     Tobacco Use    Smoking status: Never     Passive exposure: Yes    Smokeless tobacco: Never   Substance Use Topics    Alcohol use: No    Drug  use: Never     Review of Systems   Constitutional:  Negative for chills and fever.   Gastrointestinal:  Negative for nausea and vomiting.   Musculoskeletal:  Negative for neck pain and neck stiffness.   Skin:  Positive for wound.   Neurological:  Negative for numbness.       Physical Exam     Initial Vitals [08/16/24 1643]   BP Pulse Resp Temp SpO2   (!) 122/90 98 17 98.2 °F (36.8 °C) 100 %      MAP       --         Physical Exam    Nursing note and vitals reviewed.  Constitutional: She appears well-developed and well-nourished.   HENT:   Head: Normocephalic and atraumatic.   Right Ear: External ear normal.   Left Ear: External ear normal.   Neck: Carotid bruit is not present.   Normal range of motion.  Cardiovascular:  Normal rate, regular rhythm, normal heart sounds and intact distal pulses.     Exam reveals no gallop and no friction rub.       No murmur heard.  Pulmonary/Chest: Breath sounds normal. No respiratory distress. She has no wheezes. She has no rhonchi. She has no rales.   Abdominal: Abdomen is soft. Bowel sounds are normal. She exhibits no distension. There is no abdominal tenderness. There is no rebound and no guarding.   Musculoskeletal:         General: Normal range of motion.      Cervical back: Normal range of motion.     Neurological: She is alert and oriented to person, place, and time. GCS score is 15. GCS eye subscore is 4. GCS verbal subscore is 5. GCS motor subscore is 6.   Skin: Laceration noted.   There is an approximately 2 cm laceration to left distal middle finger on the cullen aspect and to the left ring finger on the dorsal aspect.  Normal strength.  Neurovascularly intact.  Normal capillary refill.   Psychiatric: She has a normal mood and affect.         ED Course   Lac Repair    Date/Time: 8/16/2024 5:20 PM    Performed by: Srinath Nolan PA-C  Authorized by: Iron Sinha MD    Consent:     Consent obtained:  Verbal    Consent given by:  Patient    Risks discussed:   Infection, poor cosmetic result, pain and poor wound healing    Alternatives discussed:  No treatment  Universal protocol:     Procedure explained and questions answered to patient or proxy's satisfaction: yes      Relevant documents present and verified: yes      Test results available: yes      Imaging studies available: yes      Required blood products, implants, devices, and special equipment available: yes      Patient identity confirmed:  Verbally with patient and arm band  Anesthesia:     Anesthesia method:  Local infiltration    Local anesthetic:  Lidocaine 1% w/o epi  Laceration details:     Location:  Finger    Finger location:  L long finger    Length (cm):  2  Pre-procedure details:     Preparation:  Patient was prepped and draped in usual sterile fashion  Exploration:     Limited defect created (wound extended): no      Contaminated: no    Treatment:     Area cleansed with:  Povidone-iodine    Amount of cleaning:  Extensive  Skin repair:     Repair method:  Sutures    Suture size:  5-0    Suture material:  Nylon    Suture technique:  Simple interrupted    Number of sutures:  3  Approximation:     Approximation:  Close  Repair type:     Repair type:  Simple  Post-procedure details:     Dressing:  Non-adherent dressing    Procedure completion:  Tolerated  Lac Repair    Date/Time: 8/16/2024 6:25 PM    Performed by: Srinath Nolan PA-C  Authorized by: Iron Sinha MD    Consent:     Consent obtained:  Verbal    Consent given by:  Patient    Risks discussed:  Infection, pain, poor cosmetic result and poor wound healing  Universal protocol:     Patient identity confirmed:  Verbally with patient and arm band  Anesthesia:     Anesthesia method:  Local infiltration    Local anesthetic:  Lidocaine 1% w/o epi  Laceration details:     Location:  Finger    Finger location:  L ring finger    Length (cm):  2  Treatment:     Area cleansed with:  Povidone-iodine    Amount of cleaning:  Standard    Irrigation  solution:  Sterile saline  Skin repair:     Repair method:  Sutures    Suture size:  5-0    Suture material:  Nylon    Suture technique:  Simple interrupted    Number of sutures:  4  Approximation:     Approximation:  Close  Repair type:     Repair type:  Simple  Post-procedure details:     Dressing:  Non-adherent dressing    Procedure completion:  Tolerated    Labs Reviewed   POCT URINALYSIS W/O SCOPE - Abnormal       Result Value    Glucose, UA Negative      Bilirubin, UA Negative      Ketones, UA Negative      Spec Grav UA >=1.030 (*)     Blood, UA Trace-intact (*)     PH, UA 5.5      Protein, UA Negative      Urobilinogen, UA 0.2      Nitrite, UA Negative      Leukocytes, UA Negative      Color, UA POC Dark yellow      Clarity, UA, POC Clear     POCT URINALYSIS W/O SCOPE          Imaging Results    None          Medications   LIDOcaine HCL 10 mg/ml (1%) injection 10 mL (10 mLs Infiltration Given by Other 8/16/24 9237)     Medical Decision Making  This is an emergent evaluation of a 24-year-old female who presents to the emergency department for evaluation of lacerations to left ring finger, left middle finger that occurred prior to arrival.    Patient looks well clinically. There is an approximately 2 cm laceration to left distal middle finger on the cullen aspect and to the left ring finger on the dorsal aspect.  Normal strength.  Neurovascularly intact.  Normal capillary refill. Regular rate rhythm without murmurs.  No carotid bruits appreciated on exam. Lungs are clear to auscultation bilaterally.  Abdomen is soft, nontender, non distended, with normal bowel sounds.     Differential diagnosis includes but is not limited to laceration, cellulitis, foreign body, other infection.    Workup initiated with UPT.  Ordered laceration kit.  Vital signs, chart, labs, and/or imaging were all reviewed.  See ED course below and interpretations above. My overall impression is laceration.  Sutures were placed.  Patient  tolerated procedure well.  Instructed her to return in 1-2 weeks for removal.  Wound care instructions provided.  Will discharge home with mupirocin. Patient is very well appearing, and in no acute distress. Vital signs are reassuring here in the emergency department, patient is afebrile, breathing comfortable, satting 98 % on room air. Patient/Caregiver is stable for discharge at this time.  Patient/Caregiver was informed of results and plan of care. Patient/Caregiver verbalized understanding of care plan. All questions and concerns were addressed. Discussed strict return precautions with the patient/caregiver. Instructed follow up with primary care provider within 1 week.      Srinath Nolan PA-C    DISCLAIMER: This note was prepared with [employer] voice recognition transcription software. Garbled syntax, mangled pronouns, and other bizarre constructions may be attributed to that software system.       Amount and/or Complexity of Data Reviewed  Labs: ordered. Decision-making details documented in ED Course.    Risk  Prescription drug management.            Scribe Attestation:   Scribe #1: I performed the above scribed service and the documentation accurately describes the services I performed. I attest to the accuracy of the note.        ED Course as of 08/16/24 1825   Fri Aug 16, 2024   1750 POCT URINALYSIS W/O SCOPE(!)  Urinalysis showing no signs of infection, negative for nitrites or leukocytes. [TM]   1750 Four sutures placed to left ring finger.  Three sutures placed to left middle finger. [TM]      ED Course User Index  [TM] Srinath Nolan PA-C          ISrinath PA-C, personally performed the services described in this documentation. All medical record entries made by the scribe were at my direction and in my presence. I have reviewed the chart and agree that the record reflects my personal performance and is accurate and complete.      DISCLAIMER: This note was prepared with [employer] voice  recognition transcription software. Garbled syntax, mangled pronouns, and other bizarre constructions may be attributed to that software system.                   Clinical Impression:  Final diagnoses:  [S6412A] Laceration of left hand without foreign body, initial encounter (Primary)          ED Disposition Condition    Discharge Stable          ED Prescriptions       Medication Sig Dispense Start Date End Date Auth. Provider    mupirocin (BACTROBAN) 2 % ointment Apply topically 3 (three) times daily. 15 g 8/16/2024 -- Srinath Nolan PA-C          Follow-up Information       Follow up With Specialties Details Why Contact Info    [facility] ED Emergency Medicine Go to  As needed, If symptoms worsen, or new symptoms develop [id number] [facility] [address] [phone]    Primary care doctor  Schedule an appointment as soon as possible for a visit in 3 days               Srinath Nolan PA-C  08/16/24 4802

## 2024-08-16 NOTE — DISCHARGE INSTRUCTIONS
You were seen in the emergency department today for laceration.  Return in 1-2 weeks for removal..  Please take all medications as prescribed and as we discussed.  Follow-up with specialist if instructed to do so.  It is important to remember that some problems are difficult to diagnose and may not be found during your Emergency Department visit. Be sure to follow up with your primary care doctor and review all labs/imaging/tests that were performed during this visit with them. Some labs/tests may be outside of the normal range and require non-emergent follow-up and further investigation to help diagnose/exclude/prevent complications or other medical conditions. Return to the emergency department for any new or worsening symptoms. Thank you for allowing me to care for you today, it was my pleasure. I hope you get to feeling better soon!

## 2024-08-23 ENCOUNTER — HOSPITAL ENCOUNTER (EMERGENCY)
Facility: HOSPITAL | Age: 24
Discharge: HOME | End: 2024-08-23
Attending: INTERNAL MEDICINE
Payer: MEDICAID

## 2024-08-23 VITALS
BODY MASS INDEX: 48.82 KG/M2 | TEMPERATURE: 99 F | HEART RATE: 100 BPM | SYSTOLIC BLOOD PRESSURE: 135 MMHG | WEIGHT: 293 LBS | RESPIRATION RATE: 18 BRPM | OXYGEN SATURATION: 98 % | HEIGHT: 65 IN | DIASTOLIC BLOOD PRESSURE: 92 MMHG

## 2024-08-23 DIAGNOSIS — Z48.02 VISIT FOR SUTURE REMOVAL: Primary | ICD-10-CM

## 2024-08-23 PROCEDURE — 99284 EMERGENCY DEPT VISIT MOD MDM: CPT | Mod: ER

## 2024-08-23 RX ORDER — FLUCONAZOLE 150 MG/1
150 TABLET ORAL DAILY
Qty: 1 TABLET | Refills: 0 | Status: SHIPPED | OUTPATIENT
Start: 2024-08-23 | End: 2024-08-24

## 2024-08-23 RX ORDER — CEPHALEXIN 500 MG/1
500 CAPSULE ORAL 2 TIMES DAILY
Qty: 14 CAPSULE | Refills: 0 | Status: SHIPPED | OUTPATIENT
Start: 2024-08-23 | End: 2024-08-30

## 2024-08-23 NOTE — DISCHARGE INSTRUCTIONS
You were seen in the emergency department today for suture removal.  Please take all medications as prescribed and as we discussed.  Follow-up with specialist if instructed to do so.  It is important to remember that some problems are difficult to diagnose and may not be found during your Emergency Department visit. Be sure to follow up with your primary care doctor and review all labs/imaging/tests that were performed during this visit with them. Some labs/tests may be outside of the normal range and require non-emergent follow-up and further investigation to help diagnose/exclude/prevent complications or other medical conditions. Return to the emergency department for any new or worsening symptoms. Thank you for allowing me to care for you today, it was my pleasure. I hope you get to feeling better soon!

## 2024-08-23 NOTE — ED NOTES
Pt requested Diflucan for prophylaxis of yeast infection due to Keflex prescription. PA notified.

## 2024-08-24 NOTE — ED PROVIDER NOTES
Encounter Date: 8/23/2024       History     Chief Complaint   Patient presents with    Suture / Staple Removal     To ed requesting suture removal to L 2nd finger. States sutures were placed here one week ago. Has no complaints      Patient is a 24-year-old female who presents to the emergency department for evaluation of suture removal.  She reports sutures were placed 1 week ago.  Denies fever, vomiting.  Denies numbness or tingling.  Denies any worsening pain.  Denies drainage from sites.  No other complaints.    The history is provided by the patient.     Review of patient's allergies indicates:   Allergen Reactions    Ibuprofen Hives     Past Medical History:   Diagnosis Date    ADHD (attention deficit hyperactivity disorder)     Anxiety     Constipation     Depression     GERD (gastroesophageal reflux disease)     IBS (irritable bowel syndrome)     Ovarian cyst     Schizophrenia in children      Past Surgical History:   Procedure Laterality Date    ESOPHAGOGASTRODUODENOSCOPY N/A 2/8/2022    Procedure: EGD (ESOPHAGOGASTRODUODENOSCOPY);  Surgeon: Berkley Mckenna MD;  Location: [facility];  Service: Endoscopy;  Laterality: N/A;    WISDOM TOOTH EXTRACTION       Family History   Problem Relation Name Age of Onset    Asthma Mother      Diabetes Father       Social History     Tobacco Use    Smoking status: Never     Passive exposure: Yes    Smokeless tobacco: Never   Substance Use Topics    Alcohol use: No    Drug use: Never     Review of Systems   Constitutional:  Negative for chills and fever.   Gastrointestinal:  Negative for rectal pain and vomiting.   Skin:  Positive for wound. Negative for color change.   Neurological:  Negative for numbness and headaches.       Physical Exam     Initial Vitals [08/23/24 1743]   BP Pulse Resp Temp SpO2   (!) 135/92 100 18 98.7 °F (37.1 °C) 98 %      MAP       --         Physical Exam    Nursing note and vitals reviewed.  Constitutional: She appears well-developed and  well-nourished.   HENT:   Head: Normocephalic and atraumatic.   Right Ear: External ear normal.   Left Ear: External ear normal.   Neck: Carotid bruit is not present.   Normal range of motion.  Cardiovascular:  Normal rate, regular rhythm, normal heart sounds and intact distal pulses.     Exam reveals no gallop and no friction rub.       No murmur heard.  Pulmonary/Chest: Breath sounds normal. No respiratory distress. She has no wheezes. She has no rhonchi. She has no rales.   Abdominal: Abdomen is soft. Bowel sounds are normal. She exhibits no distension. There is no abdominal tenderness. There is no rebound and no guarding.   Musculoskeletal:         General: Normal range of motion.      Cervical back: Normal range of motion.     Neurological: She is alert and oriented to person, place, and time. GCS score is 15. GCS eye subscore is 4. GCS verbal subscore is 5. GCS motor subscore is 6.   Skin:   The left long finger has 3 sutures in place.  Wound appears well healed however there is slight erythema surrounding the wound.  No drainage.  It is not tender to touch.  Left ring finger is well healed.  No sutures in place.  Patient reports they fell out on their own.   Psychiatric: She has a normal mood and affect.         ED Course   Suture Removal    Date/Time: 8/23/2024 7:53 PM  Location procedure was performed: [facility] EMERGENCY DEPARTMENT    Performed by: Srinath Nolan PA-C  Authorized by: Hemanth Lance MD  Body area: upper extremity  Location details: left long finger  Wound Appearance: clean, well healed, erythematous, no drainage and nontender  Sutures Removed: 3  Complications: No  Specimens: No  Implants: No        Labs Reviewed - No data to display       Imaging Results    None          Medications - No data to display  Medical Decision Making  This is an emergent evaluation of a 24-year-old female who presents to the emergency department for evaluation of suture removal.    Patient looks well  clinically. The left long finger has 3 sutures in place.  Wound appears well healed however there is slight erythema surrounding the wound.  No drainage.  It is not tender to touch.  Left ring finger is well healed.  No sutures in place.  Patient reports they fell out on their own. Regular rate rhythm without murmurs.  No carotid bruits appreciated on exam. Lungs are clear to auscultation bilaterally.  Abdomen is soft, nontender, non distended, with normal bowel sounds.     Vital signs, chart, labs, and/or imaging were all reviewed.  See ED course below and interpretations above.  Sutures were removed.  Given increased erythema, we will go ahead and discharge home with Keflex.  We will also provide Diflucan tablet for yeast infection that patient states she gets after antibiotics.  Patient is very well appearing, and in no acute distress. Vital signs are reassuring here in the emergency department, patient is afebrile, breathing comfortable, satting 98 % on room air. Patient/Caregiver is stable for discharge at this time.  Patient/Caregiver was informed of results and plan of care. Patient/Caregiver verbalized understanding of care plan. All questions and concerns were addressed. Discussed strict return precautions with the patient/caregiver. Instructed follow up with primary care provider within 1 week.      Srinath Nolan PA-C    DISCLAIMER: This note was prepared with [employer] voice recognition transcription software. Garbled syntax, mangled pronouns, and other bizarre constructions may be attributed to that software system.       Risk  Prescription drug management.               ED Course as of 08/23/24 1954   Fri Aug 23, 2024   1855 Requesting Diflucan tablet for after antibiotics.  Sent into pharmacy. [TM]      ED Course User Index  [TM] Srinath Nolan PA-C                           Clinical Impression:  Final diagnoses:  [Z48.02] Visit for suture removal (Primary)          ED Disposition Condition     Discharge Stable          ED Prescriptions       Medication Sig Dispense Start Date End Date Auth. Provider    cephALEXin (KEFLEX) 500 MG capsule Take 1 capsule (500 mg total) by mouth 2 (two) times a day. for 7 days 14 capsule 8/23/2024 8/30/2024 Srinath Nolan PA-C    fluconazole (DIFLUCAN) 150 MG Tab Take 1 tablet (150 mg total) by mouth once daily. for 1 day 1 tablet 8/23/2024 8/24/2024 Srinath Nolan PA-C          Follow-up Information       Follow up With Specialties Details Why Contact Info    [facility] ED Emergency Medicine Go to  As needed, If symptoms worsen, or new symptoms develop [address] [facility] [address] [phone]    Primary care doctor  Schedule an appointment as soon as possible for a visit in 3 days               Srinath Nolan PA-C  08/23/24 7044

## 2024-09-10 ENCOUNTER — HOSPITAL ENCOUNTER (EMERGENCY)
Facility: HOSPITAL | Age: 24
Discharge: HOME | End: 2024-09-10
Attending: EMERGENCY MEDICINE
Payer: MEDICAID

## 2024-09-10 VITALS
OXYGEN SATURATION: 96 % | HEART RATE: 89 BPM | HEIGHT: 65 IN | SYSTOLIC BLOOD PRESSURE: 129 MMHG | WEIGHT: 293 LBS | BODY MASS INDEX: 48.82 KG/M2 | TEMPERATURE: 98 F | RESPIRATION RATE: 18 BRPM | DIASTOLIC BLOOD PRESSURE: 76 MMHG

## 2024-09-10 DIAGNOSIS — M25.569 KNEE PAIN: ICD-10-CM

## 2024-09-10 DIAGNOSIS — S83.92XA SPRAIN OF LEFT KNEE, UNSPECIFIED LIGAMENT, INITIAL ENCOUNTER: Primary | ICD-10-CM

## 2024-09-10 LAB
B-HCG UR QL: NEGATIVE
CTP QC/QA: YES

## 2024-09-10 PROCEDURE — 25000003 PHARM REV CODE 250: Performed by: PHYSICIAN ASSISTANT

## 2024-09-10 PROCEDURE — 99284 EMERGENCY DEPT VISIT MOD MDM: CPT | Mod: 25

## 2024-09-10 PROCEDURE — 81025 URINE PREGNANCY TEST: CPT | Performed by: PHYSICIAN ASSISTANT

## 2024-09-10 PROCEDURE — 29505 APPLICATION LONG LEG SPLINT: CPT | Mod: LT

## 2024-09-10 RX ORDER — OXYCODONE AND ACETAMINOPHEN 5; 325 MG/1; MG/1
1 TABLET ORAL
Status: COMPLETED | OUTPATIENT
Start: 2024-09-10 | End: 2024-09-10

## 2024-09-10 RX ORDER — OXYCODONE HYDROCHLORIDE 5 MG/1
5 TABLET ORAL EVERY 4 HOURS PRN
Qty: 12 TABLET | Refills: 0 | Status: SHIPPED | OUTPATIENT
Start: 2024-09-10 | End: 2024-09-13

## 2024-09-10 RX ORDER — ACETAMINOPHEN 500 MG
500 TABLET ORAL EVERY 4 HOURS PRN
Qty: 20 TABLET | Refills: 0 | Status: SHIPPED | OUTPATIENT
Start: 2024-09-10 | End: 2024-09-15

## 2024-09-10 RX ADMIN — OXYCODONE HYDROCHLORIDE AND ACETAMINOPHEN 1 TABLET: 5; 325 TABLET ORAL at 10:09

## 2024-09-10 NOTE — Clinical Note
"Felicity Man" Noel was seen and treated in our emergency department on 9/10/2024.  She may return to work on 09/17/2024.       If you have any questions or concerns, please don't hesitate to call.      Sherri Nelson PA-C"

## 2024-09-10 NOTE — ED PROVIDER NOTES
Encounter Date: 9/10/2024       History     Chief Complaint   Patient presents with    Fall     Pt reports to ED following a fall after she lost balance causing her to fall and injure her left knee. States she fell forwards and knee was stuck and locked in a certain position, unable to apply pressure per pt.      Chief complaint: Fall    HPI:     24-year-old female with history of ADHD, anxiety, constipation, depression, IBS, GERD, ovarian cyst, schizophrenia presenting status post fall that occurred today at 7am patient complaining of left knee pain. Reports that she slipped inside of her house while leaving for school causing her left knee to lock and fall forward onto her left side. Denies head injury, neck pain, loc, headache, back pain, sob. Pt states that experienced numbness and tingling in her left knee immediately after fall for approximately 30 mins but has since regained sensation. She is now unable to bear weight to left knee. Due to pain,.       The history is provided by the patient. No  was used.     Review of patient's allergies indicates:   Allergen Reactions    Ibuprofen Hives     Past Medical History:   Diagnosis Date    ADHD (attention deficit hyperactivity disorder)     Anxiety     Constipation     Depression     GERD (gastroesophageal reflux disease)     IBS (irritable bowel syndrome)     Ovarian cyst     Schizophrenia in children      Past Surgical History:   Procedure Laterality Date    ESOPHAGOGASTRODUODENOSCOPY N/A 2/8/2022    Procedure: EGD (ESOPHAGOGASTRODUODENOSCOPY);  Surgeon: Berkley Mckenna MD;  Location: [facility];  Service: Endoscopy;  Laterality: N/A;    WISDOM TOOTH EXTRACTION       Family History   Problem Relation Name Age of Onset    Asthma Mother      Diabetes Father       Social History     Tobacco Use    Smoking status: Never     Passive exposure: Yes    Smokeless tobacco: Never   Substance Use Topics    Alcohol use: No    Drug use: Never     Review  of Systems   Constitutional:  Negative for fever.   HENT:  Negative for sore throat.    Respiratory:  Negative for shortness of breath.    Cardiovascular:  Negative for chest pain.   Gastrointestinal:  Negative for nausea.   Genitourinary:  Negative for dysuria.   Musculoskeletal:  Positive for arthralgias. Negative for back pain.   Skin:  Negative for rash.   Neurological:  Positive for numbness. Negative for weakness.   Hematological:  Does not bruise/bleed easily.       Physical Exam     Initial Vitals [09/10/24 0813]   BP Pulse Resp Temp SpO2   118/63 76 18 98.1 °F (36.7 °C) 98 %      MAP       --         Physical Exam    Nursing note and vitals reviewed.  Constitutional: She appears well-developed and well-nourished. No distress.   HENT:   Head: Normocephalic.   Right Ear: External ear normal.   Left Ear: External ear normal.   Eyes: Conjunctivae are normal. Right eye exhibits no discharge. Left eye exhibits no discharge. No scleral icterus.   Neck: No tracheal deviation present.   Cardiovascular:            Pulses:       Popliteal pulses are 2+ on the left side.        Dorsalis pedis pulses are 2+ on the right side and 2+ on the left side.        Posterior tibial pulses are 2+ on the right side and 2+ on the left side.   Pulmonary/Chest: No stridor. No respiratory distress.   Musculoskeletal:         General: Normal range of motion.      Right knee: Normal.      Left knee: Swelling present. Normal range of motion. Tenderness present.      Comments:   to bear weight to left knee due to pain.    Pain to the left popliteal region with anterior drawer test.  No ligamentous laxity appreciated.  Worsening pain with valgus stress so of the left knee       Neurological: She is alert. No sensory deficit.   Skin: Skin is warm and dry. Capillary refill takes less than 2 seconds. No rash noted. No erythema.   Psychiatric: She has a normal mood and affect. Her behavior is normal. Judgment and thought content normal.          ED Course   Procedures  Labs Reviewed   POCT URINE PREGNANCY       Result Value    POC Preg Test, Ur Negative       Acceptable Yes            Imaging Results              X-Ray Knee 3 View Left (Final result)  Result time 09/10/24 08:51:01      Final result by Jon East III, MD (09/10/24 08:51:01)                   Narrative:    EXAMINATION:  XR KNEE 3 VIEW LEFT    CLINICAL HISTORY:  Pain in unspecified knee    FINDINGS:  Knee three views left.    No fracture dislocation bone destruction or OCD seen.      Electronically signed by: Jon East MD  Date:    09/10/2024  Time:    08:51                                     Medications   oxyCODONE-acetaminophen 5-325 mg per tablet 1 tablet (1 tablet Oral Given 9/10/24 1002)     Medical Decision Making  24-year-old female presenting for evaluation of left knee pain.  Exam above.  No neurovascular deficits.  Exam concerning for possible knee sprain.  No ligamentous laxity appreciated.  Xray independtly interpreted and negative for fracture or dislocation. Knee immobilizer placed for knee sprain. Toradol IM given in the ED for pain.  UPT negative.  Will refer to Orthopedics for further evaluation and management. Possible ACL injury. Considered but doubt vascular injury as pt is neurovascularly intact with normal pulses and sensation    Amount and/or Complexity of Data Reviewed  Labs: ordered.  Radiology: ordered.    Risk  OTC drugs.  Prescription drug management.                                      Clinical Impression:  Final diagnoses:  [M25.569] Knee pain  [S83.92XA] Sprain of left knee, unspecified ligament, initial encounter (Primary)          ED Disposition Condition    Discharge Stable          ED Prescriptions       Medication Sig Dispense Start Date End Date Auth. Provider    acetaminophen (TYLENOL) 500 MG tablet Take 1 tablet (500 mg total) by mouth every 4 (four) hours as needed. 20 tablet 9/10/2024 9/15/2024 Sherri Nelson  CHLOE THOMPSON    oxyCODONE (ROXICODONE) 5 MG immediate release tablet Take 1 tablet (5 mg total) by mouth every 4 (four) hours as needed for Pain. 12 tablet 9/10/2024 9/13/2024 Sherri Nelson PA-C          Follow-up Information       Follow up With Specialties Details Why Contact Info    Deacon Aguiar MD Family Medicine Schedule an appointment as soon as possible for a visit in 2 days for follow up [address] [facility] [place] [id number] [phone]      [facility] Emergency Dept Emergency Medicine Go to  As needed, If symptoms worsen [address] [facility] [place] [address] [phone]    [facility] Orthopedics Schedule an appointment as soon as possible for a visit in 2 days for follow up [address] [facility] [place] [address] [phone]    Robert Wakefield MD Orthopedic Surgery Schedule an appointment as soon as possible for a visit  for follow up with orthopedics [address]  [facility] [id number] [phone]      Luis Carlos Rudd MD Orthopedic Surgery, Hand Surgery Schedule an appointment as soon as possible for a visit  for orthopedics follow up [address]  [facility] [id number] [phone]      Chi Ferro MD Orthopedic Surgery Schedule an appointment as soon as possible for a visit in 2 days for follow up [address] [facility]  [address]  [facility] [address] [phone]               Sherri Nelson PA-C  09/10/24 [id number]

## 2024-09-10 NOTE — DISCHARGE INSTRUCTIONS

## 2024-09-17 ENCOUNTER — NURSE TRIAGE (OUTPATIENT)
Dept: ADMINISTRATIVE | Facility: CLINIC | Age: 24
End: 2024-09-17
Payer: MEDICAID

## 2024-09-17 NOTE — TELEPHONE ENCOUNTER
Pt states she injured her knee last week. She refused Triage and wants to speak with her PCP. Pt would like a referral to see Ortho. Advised pt this message will go to the PCP and Office nurse now to contact her directly.

## 2024-09-19 ENCOUNTER — OFFICE VISIT (OUTPATIENT)
Dept: ORTHOPEDICS | Facility: CLINIC | Age: 24
End: 2024-09-19
Payer: MEDICAID

## 2024-09-19 DIAGNOSIS — M25.562 ACUTE PAIN OF LEFT KNEE: Primary | ICD-10-CM

## 2024-09-19 DIAGNOSIS — S83.92XA SPRAIN OF LEFT KNEE, UNSPECIFIED LIGAMENT, INITIAL ENCOUNTER: ICD-10-CM

## 2024-09-19 DIAGNOSIS — M25.462 EFFUSION OF LEFT KNEE: ICD-10-CM

## 2024-09-19 PROCEDURE — 99999 PR PBB SHADOW E&M-EST. PATIENT-LVL III: CPT | Mod: PBBFAC,,, | Performed by: NURSE PRACTITIONER

## 2024-09-19 PROCEDURE — 99204 OFFICE O/P NEW MOD 45 MIN: CPT | Mod: S$PBB,,, | Performed by: NURSE PRACTITIONER

## 2024-09-19 PROCEDURE — 99213 OFFICE O/P EST LOW 20 MIN: CPT | Mod: PBBFAC | Performed by: NURSE PRACTITIONER

## 2024-09-19 PROCEDURE — 1159F MED LIST DOCD IN RCRD: CPT | Mod: CPTII,,, | Performed by: NURSE PRACTITIONER

## 2024-09-19 PROCEDURE — 1160F RVW MEDS BY RX/DR IN RCRD: CPT | Mod: CPTII,,, | Performed by: NURSE PRACTITIONER

## 2024-09-19 RX ORDER — METHYLPREDNISOLONE 4 MG/1
TABLET ORAL
Qty: 21 EACH | Refills: 0 | Status: SHIPPED | OUTPATIENT
Start: 2024-09-19 | End: 2024-10-10

## 2024-09-19 NOTE — LETTER
September 19, 2024      [place] [facility]  [address]   [address]  [facility] [address]  Phone: [phone]  Fax: [phone]       Patient: Felicity Cohen   YOB: 2000  Date of Visit: 09/19/2024    To Whom It May Concern:    Shannon Cohen  was at [facility] on 09/19/2024. The patient will be on no work duty x 2 weeks. If you have any questions or concerns, or if I can be of further assistance, please do not hesitate to contact me.    Sincerely,    Narinder Vidal NP

## 2024-09-19 NOTE — PROGRESS NOTES
New patient visit: Left Knee injury:     Patient ID: Felicity Cohen is a 24 y.o. female here for an injury to the left knee. She is referred by [facility].     Date of Injury: 09/10/24     Chief Complaint: Lt knee pain, swelling and instability     Location: LT knee     HPI:  Patient presents for an injury involving the left knee. She reports DOI was 09/10/24. She states that she was at home and loss her balance. She states that she fell and left knee buckled/twisted upon falling. She had immediate pain in the left knee and the left lower leg felt numb. She presented to the [facility] ER post injury. She states that radiographs were negative. She states that she was placed in an immobilizer, pain medication and crutches. She was referred to orthopedics for suspected internal derangement.      She presents and rates her pain as 7/10. Pain is diffuse over the lateral and anterior aspect of the left knee. She is having difficulty bearing weight. She reports knee instability and weakness with weight bearing. She states that the pain has been constant since the fall. She is unable to extend of fully flex the knee. Pain is aggravated by any weight bearing and ROM. She denies locking. She is noted with a marked limp.      Medical History:  Past Medical History:   Diagnosis Date    ADHD (attention deficit hyperactivity disorder)     Anxiety     Constipation     Depression     GERD (gastroesophageal reflux disease)     IBS (irritable bowel syndrome)     Ovarian cyst     Schizophrenia in children        Surgical History:  Past Surgical History:   Procedure Laterality Date    ESOPHAGOGASTRODUODENOSCOPY N/A 2/8/2022    Procedure: EGD (ESOPHAGOGASTRODUODENOSCOPY);  Surgeon: Berkley Mckenna MD;  Location: [facility];  Service: Endoscopy;  Laterality: N/A;    WISDOM TOOTH EXTRACTION         Family History:  Family History   Problem Relation Name Age of Onset    Asthma Mother      Diabetes Father         Allergies:    Review of patient's allergies indicates:   Allergen Reactions    Ibuprofen Hives           Review of Systems  Respiratory: Negative for shortness of breath.    Cardiovascular: Negative for chest pain.  Gastrointestinal: Negative for abdominal pain.  Neurological: Negative for headaches.   Musculoskeletal ROS: positive for - RT knee joint pain, effusion  All other systems reviewed and are negative.        Objective:      Antalgic gait.      Physical Exam:  Alignment: normal     ROM:   L - Flexion - 10 degrees,    Extension - 95 degrees  pain with forced flexion  R - Flexion - 0 degrees,    Extension - 125 degrees  L- Strength:  Extension - 4/5      Flexion - 4/5  R - Strength:  Extension - 5/5      Flexion - 5/5  Effusion: 0-1+ LT knee     Pat/Fem:   Crepitus - negative  Patellar grind test - negative   Pat tendon tenderness - negative  Tibial tubercle tenderness - negative  Apprehension - negative  Illiotibial band tenderness - negative   Anserine bursa tenderness - negative   Quad lift intact.     Ligaments: (LT Knee)  MCL: Medial opening @ 0:negative      @ 30: negative  Pain with palpation - guarded  LCL: Lateral opening @0: negative      @ 30: negative  Pain with palpation - present     Ant Drawer - guarded  PCL:  Sag - negative           Post Drawer - negative     Meniscus:  LT Knee  JL tenderness - lateral joint line  Heaven - pain is present with testing  Pain with forced flexion      Popliteal mass - negative B/L     General appearance: NAD  Peripheral pulses: normal  Skin examination: no erythema   Reflexes: normal  Mood and Effect: normal  Palpation of lymph nodes: normal  Coordination: normal  Sensation: normal     Xray:  I reviewed LT Knee series done 09/10/24  No fracture dislocation bone destruction or OCD seen.     Assessment:      LT knee pain, effusion and instability     Plan:      The total face-to-face encounter time with this patient was 30 mins and greater than 50% of of the encounter time  was spent counseling the patient, coordinating care, and education regarding the pathology and natural history of her diagnosis. I made the decision to review previous ER records of the patient including previous notes and imaging. I independently reviewed the prior radiographs done with no acute fracture seen.      MRI LT Knee was ordered to evaluate for suspected internal derangement. She had a recent injury. She is has an effusion, decreased ROM (10 deg ext lag), joint line tenderness and joint instability.  Ice, ACE compression and PWB as directed with crutches  Rx- medrol dose chiqui as directed. She is allergic to Nsaids.  Reviewed dosing and side effects.   She will start physician directed PROM exercises for the left knee to prevent stiffness or contracture of the joint.  RTC post MRI for review. Will make referral for surgical evaluation if needed pending MRI results.   6.   Work excuse given.   7.   She had no further questions.

## 2024-09-24 NOTE — PROGRESS NOTES
Subjective:       Felicity Cohen is a 24 y.o. female who is a new patient who was referred by Rosalind Purdy  for evaluation of bladder issues.      Seen in the ER previously with dysuria x 1 week. Initially given Macrobid x 5d without full relief. Then given Pyridium at next ER visit. PCP then gave Cipro x 14d. Repeat ER visit for something else, given Keflex. Symptoms have now resolved. She reports occasional mild dysuria.    CT RSS 7/24 - no stones/hydro.     PVR (bladder scan) today - 83cc    UCx:  7/3/24 - neg  7/10/24 - neg      The following portions of the patient's history were reviewed and updated as appropriate: allergies, current medications, past family history, past medical history, past social history, past surgical history and problem list.    Review of Systems  12 point review of systems completed. Pertinent positive and negatives listed in HPI        Objective:    Vitals: Wt (!) 155.9 kg (343 lb 11.2 oz)   BMI 57.19 kg/m²     Physical Exam   General: well developed, well nourished in no acute distress  Head: normocephalic, atraumatic  Neck: no obvious enlargement of thyroid  HEENT: EOMI, mucus membranes moist, sclera anicteric, no hearing impairment  Lungs: symmetric expansion, non-labored breathing  Neuro: alert and oriented x 3, no gross deficits  Psych: normal judgment and insight, normal mood/affect and non-anxious  Genitourinary:   deferred      Lab Review   Urine analysis today in clinic shows positive for trace red blood cells (menstruating)    Lab Results   Component Value Date    WBC 8.6 01/25/2022    WBC 7.8 03/10/2020    HGB 12.2 01/25/2022    HGB 12.7 03/10/2020    HCT 39.3 01/25/2022    HCT 40.1 03/10/2020    MCV 87 01/25/2022    MCV 85 03/10/2020     01/25/2022     03/10/2020     Lab Results   Component Value Date    CREATININE 0.62 01/25/2022    BUN 8 01/25/2022       Imaging  Images and reports were personally reviewed by me and discussed with patient  CT RSS  reviewed       Assessment/Plan:      1. Dysuria    - Recent UTI symptoms. Given multiple rounds of abx. Initial UCx negative.   - Symptoms have now resolved   - UA clear   - No further workup necessary at this time         Follow up PRN

## 2024-09-26 ENCOUNTER — OFFICE VISIT (OUTPATIENT)
Dept: UROLOGY | Facility: CLINIC | Age: 24
End: 2024-09-26
Payer: MEDICAID

## 2024-09-26 VITALS — WEIGHT: 293 LBS | BODY MASS INDEX: 57.19 KG/M2

## 2024-09-26 DIAGNOSIS — R30.0 DYSURIA: Primary | ICD-10-CM

## 2024-09-26 PROCEDURE — 3008F BODY MASS INDEX DOCD: CPT | Mod: CPTII,,, | Performed by: UROLOGY

## 2024-09-26 PROCEDURE — 99999 PR PBB SHADOW E&M-EST. PATIENT-LVL III: CPT | Mod: PBBFAC,,, | Performed by: UROLOGY

## 2024-09-26 PROCEDURE — 1159F MED LIST DOCD IN RCRD: CPT | Mod: CPTII,,, | Performed by: UROLOGY

## 2024-09-26 PROCEDURE — 1160F RVW MEDS BY RX/DR IN RCRD: CPT | Mod: CPTII,,, | Performed by: UROLOGY

## 2024-09-26 PROCEDURE — 99213 OFFICE O/P EST LOW 20 MIN: CPT | Mod: PBBFAC | Performed by: UROLOGY

## 2024-09-26 PROCEDURE — 99203 OFFICE O/P NEW LOW 30 MIN: CPT | Mod: S$PBB,,, | Performed by: UROLOGY

## 2024-10-01 ENCOUNTER — HOSPITAL ENCOUNTER (OUTPATIENT)
Dept: RADIOLOGY | Facility: HOSPITAL | Age: 24
Discharge: HOME | End: 2024-10-01
Attending: NURSE PRACTITIONER
Payer: MEDICAID

## 2024-10-01 DIAGNOSIS — M25.562 ACUTE PAIN OF LEFT KNEE: ICD-10-CM

## 2024-10-01 DIAGNOSIS — S83.92XA SPRAIN OF LEFT KNEE, UNSPECIFIED LIGAMENT, INITIAL ENCOUNTER: ICD-10-CM

## 2024-10-01 DIAGNOSIS — M25.462 EFFUSION OF LEFT KNEE: ICD-10-CM

## 2024-10-01 PROCEDURE — 73721 MRI JNT OF LWR EXTRE W/O DYE: CPT | Mod: 26,LT,, | Performed by: RADIOLOGY

## 2024-10-01 PROCEDURE — 73721 MRI JNT OF LWR EXTRE W/O DYE: CPT | Mod: TC,LT

## 2024-10-14 ENCOUNTER — OFFICE VISIT (OUTPATIENT)
Dept: ORTHOPEDICS | Facility: CLINIC | Age: 24
End: 2024-10-14
Payer: MEDICAID

## 2024-10-14 ENCOUNTER — TELEPHONE (OUTPATIENT)
Dept: SPORTS MEDICINE | Facility: CLINIC | Age: 24
End: 2024-10-14
Payer: MEDICAID

## 2024-10-14 DIAGNOSIS — S83.512A RUPTURE OF ANTERIOR CRUCIATE LIGAMENT OF LEFT KNEE, INITIAL ENCOUNTER: ICD-10-CM

## 2024-10-14 DIAGNOSIS — S83.422A TEAR OF LATERAL COLLATERAL LIGAMENT OF LEFT KNEE, INITIAL ENCOUNTER: ICD-10-CM

## 2024-10-14 DIAGNOSIS — M25.562 ACUTE PAIN OF LEFT KNEE: Primary | ICD-10-CM

## 2024-10-14 DIAGNOSIS — S83.8X2D ACUTE MEDIAL MENISCAL INJURY OF LEFT KNEE, SUBSEQUENT ENCOUNTER: ICD-10-CM

## 2024-10-14 PROCEDURE — 99999 PR PBB SHADOW E&M-EST. PATIENT-LVL III: CPT | Mod: PBBFAC,,, | Performed by: NURSE PRACTITIONER

## 2024-10-14 PROCEDURE — 1160F RVW MEDS BY RX/DR IN RCRD: CPT | Mod: CPTII,,, | Performed by: NURSE PRACTITIONER

## 2024-10-14 PROCEDURE — 99213 OFFICE O/P EST LOW 20 MIN: CPT | Mod: PBBFAC | Performed by: NURSE PRACTITIONER

## 2024-10-14 PROCEDURE — 99213 OFFICE O/P EST LOW 20 MIN: CPT | Mod: S$PBB,,, | Performed by: NURSE PRACTITIONER

## 2024-10-14 PROCEDURE — 1159F MED LIST DOCD IN RCRD: CPT | Mod: CPTII,,, | Performed by: NURSE PRACTITIONER

## 2024-10-14 NOTE — PROGRESS NOTES
Follow up visit: Left Knee injury:     Patient ID: Felicity Cohen is a 24 y.o. female here for left knee MRI review.      Date of Injury: 09/10/24     Chief Complaint: Lt knee pain, swelling and instability     Location: LT knee     HPI:  Patient presents for a follow up visit for an injury involving the left knee. DOI was 09/10/24. She is here for MRI review of the left knee. She presents and rates her pain as 5/10. She is using a cane for support and denies any falls. She states that the pain is over the lateral and anterior aspect of the left knee. She reports knee instability  at times with weight bearing. She is reports improved ROM. Pain is aggravated by weight bearing and ROM. She denies locking. She is noted with a limp.       Medical History:       Past Medical History:   Diagnosis Date    ADHD (attention deficit hyperactivity disorder)      Anxiety      Constipation      Depression      GERD (gastroesophageal reflux disease)      IBS (irritable bowel syndrome)      Ovarian cyst      Schizophrenia in children           Surgical History:        Past Surgical History:   Procedure Laterality Date    ESOPHAGOGASTRODUODENOSCOPY N/A 2/8/2022     Procedure: EGD (ESOPHAGOGASTRODUODENOSCOPY);  Surgeon: Berkley Mckenna MD;  Location: [facility];  Service: Endoscopy;  Laterality: N/A;    WISDOM TOOTH EXTRACTION             Family History:         Family History   Problem Relation Name Age of Onset    Asthma Mother        Diabetes Father             Allergies:        Review of patient's allergies indicates:   Allergen Reactions    Ibuprofen Hives            Review of Systems  Respiratory: Negative for shortness of breath.    Cardiovascular: Negative for chest pain.  Gastrointestinal: Negative for abdominal pain.  Neurological: Negative for headaches.   Musculoskeletal ROS: positive for - RT knee joint pain, effusion  All other systems reviewed and are negative.        Objective:      Antalgic gait.       Physical Exam:  Alignment: normal     ROM:   L - Flexion - 5 degrees,    Extension - 105 degrees    R - Flexion - 0 degrees,    Extension - 125 degrees  L- Strength:  Extension - 4/5      Flexion - 4/5  R - Strength:  Extension - 5/5      Flexion - 5/5  Effusion: trace  LT knee     Pat/Fem:   Crepitus - negative  Patellar grind test - negative   Pat tendon tenderness - negative  Tibial tubercle tenderness - negative  Apprehension - negative  Illiotibial band tenderness - negative   Anserine bursa tenderness - negative   Quad lift intact.     Ligaments: (LT Knee)  MCL: Pain with palpation - negative  LCL: Pain with palpation - present     Ant Drawer - positive  PCL:  Sag - negative           Post Drawer - negative     Meniscus:  LT Knee  JL tenderness - positive  Heaven - pain is present- joint line  Pain with forced flexion      Popliteal mass - negative B/L     General appearance: NAD  Peripheral pulses: normal  Skin examination: no erythema   Reflexes: normal  Mood and Effect: normal  Palpation of lymph nodes: normal  Coordination: normal  Sensation: normal     Xray:  I reviewed LT Knee series done 09/10/24  No fracture dislocation bone destruction or OCD seen.     LT Knee MRI was reviewed by me:  There is a complete tear of the ACL. The PCL is intact. The quadriceps and patellar tendons are unremarkable. There is a tear of the proximal LCL. The MCL is intact. There is a bone contusion of the anterior nonweightbearing aspect of the medial femoral condyle. There is a tear of the posterior horn of the medial meniscus. The lateral meniscus is unremarkable. The patella, its cartilage, and retinacula are intact. There is a small joint effusion. There may be some mild early DJD of the medial knee joint compartment. There is a small popliteal fossa Baker's cyst with inferior extension.      Assessment:      LT knee pain, ACL tear, LCL tear and medial meniscal tear     Plan:      MRI LT Knee was reviewed, consistent  with an ACL tear, LCL tear and medial meniscal tear.   Referral to orthopedic surgery for surgical evaluation.   Continue the Ice, ACE compression and PWB as directed with crutches.  She is allergic to Nsaids. Tylenol as previous.   Continue physician directed PROM exercises for the left knee to prevent stiffness or contracture of the joint.  RTC prn.  7.   She had no further questions.

## 2024-10-14 NOTE — TELEPHONE ENCOUNTER
----- Message from Kiara sent at 10/14/2024  9:47 AM CDT -----  Regarding: Referral      What is the request in detail: Please call pt to schedule Orthopedic referral.Please advise.    Can the clinic reply by [facility]? No    Best Call Back Number: [phone]        Additional Information:

## 2024-10-17 ENCOUNTER — HOSPITAL ENCOUNTER (OUTPATIENT)
Dept: RADIOLOGY | Facility: HOSPITAL | Age: 24
Discharge: HOME | End: 2024-10-17
Attending: STUDENT IN AN ORGANIZED HEALTH CARE EDUCATION/TRAINING PROGRAM
Payer: MEDICAID

## 2024-10-17 ENCOUNTER — OFFICE VISIT (OUTPATIENT)
Dept: SPORTS MEDICINE | Facility: CLINIC | Age: 24
End: 2024-10-17
Payer: MEDICAID

## 2024-10-17 VITALS
DIASTOLIC BLOOD PRESSURE: 73 MMHG | SYSTOLIC BLOOD PRESSURE: 102 MMHG | WEIGHT: 293 LBS | HEART RATE: 103 BPM | BODY MASS INDEX: 48.82 KG/M2 | HEIGHT: 65 IN

## 2024-10-17 DIAGNOSIS — S83.8X2D ACUTE MEDIAL MENISCAL INJURY OF LEFT KNEE, SUBSEQUENT ENCOUNTER: ICD-10-CM

## 2024-10-17 DIAGNOSIS — S83.422A TEAR OF LATERAL COLLATERAL LIGAMENT OF LEFT KNEE, INITIAL ENCOUNTER: ICD-10-CM

## 2024-10-17 DIAGNOSIS — S83.512A RUPTURE OF ANTERIOR CRUCIATE LIGAMENT OF LEFT KNEE, INITIAL ENCOUNTER: ICD-10-CM

## 2024-10-17 DIAGNOSIS — S83.512A RUPTURE OF ANTERIOR CRUCIATE LIGAMENT OF LEFT KNEE, INITIAL ENCOUNTER: Primary | ICD-10-CM

## 2024-10-17 PROCEDURE — 73564 X-RAY EXAM KNEE 4 OR MORE: CPT | Mod: 26,50,, | Performed by: RADIOLOGY

## 2024-10-17 PROCEDURE — 73564 X-RAY EXAM KNEE 4 OR MORE: CPT | Mod: TC,50

## 2024-10-17 PROCEDURE — 99214 OFFICE O/P EST MOD 30 MIN: CPT | Mod: PBBFAC,25 | Performed by: STUDENT IN AN ORGANIZED HEALTH CARE EDUCATION/TRAINING PROGRAM

## 2024-10-17 PROCEDURE — 99999 PR PBB SHADOW E&M-EST. PATIENT-LVL IV: CPT | Mod: PBBFAC,,, | Performed by: STUDENT IN AN ORGANIZED HEALTH CARE EDUCATION/TRAINING PROGRAM

## 2024-10-17 NOTE — PROGRESS NOTES
.  Subjective:          Chief Complaint: Felicity Cohen is a 24 y.o. female who had concerns including Pain of the Left Knee.    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  On 09/10/2024 she was moving swiftly around her home prior to a nursing exam and had a twisting injury.  She believes she felt and heard a pop.  She currently rates her pain 4/10 and it is diffuse throughout the knee.  She presents today using a cane for assistance with mobilization.  She states her knee feels very unstable when she was weight-bearing.  She has had effusions and swelling since.  She has decreased motion of the knee including sensations of locking.        Past Medical History:   Diagnosis Date    ADHD (attention deficit hyperactivity disorder)     Anxiety     Constipation     Depression     GERD (gastroesophageal reflux disease)     IBS (irritable bowel syndrome)     Ovarian cyst     Schizophrenia in children        Current Outpatient Medications on File Prior to Visit   Medication Sig Dispense Refill    omega-3/dha/epa/ala/vitamin D3 (OMEGA-3-DHA-EPA-ALA-VIT D3 ORAL) Take by mouth.      biotin 1 mg tablet Take 1,000 mcg by mouth twice a week. (Patient not taking: Reported on 10/17/2024)      ergocalciferol (ERGOCALCIFEROL) 50,000 unit Cap TAKE ONE CAPSULE BY MOUTH EVERY 7 DAYS (Patient not taking: Reported on 10/17/2024) 12 capsule 3    ergocalciferol (ERGOCALCIFEROL) 50,000 unit Cap Take 1 capsule (50,000 Units total) by mouth every 7 days. (Patient not taking: Reported on 10/17/2024) 12 capsule 3    Lactobacillus rhamnosus GG (CULTURELLE) 10 billion cell capsule Take 1 capsule by mouth once daily. (Patient not taking: Reported on 10/17/2024)      mupirocin (BACTROBAN) 2 % ointment Apply topically 3 (three) times daily. (Patient not taking: Reported on 10/17/2024) 15 g 0    polyethylene glycol (GLYCOLAX) 17 gram/dose powder Take 17 g by mouth once daily. (Patient not  taking: Reported on 10/17/2024) 510 g 11    triamcinolone acetonide 0.1% (KENALOG) 0.1 % ointment Apply topically 2 (two) times daily. To affected area (Patient not taking: Reported on 10/17/2024) 80 g 0     No current facility-administered medications on file prior to visit.       Past Surgical History:   Procedure Laterality Date    ESOPHAGOGASTRODUODENOSCOPY N/A 2/8/2022    Procedure: EGD (ESOPHAGOGASTRODUODENOSCOPY);  Surgeon: Berkley Mckenna MD;  Location: [facility];  Service: Endoscopy;  Laterality: N/A;    WISDOM TOOTH EXTRACTION         Family History   Problem Relation Name Age of Onset    Asthma Mother      Diabetes Father         Social History     Socioeconomic History    Marital status: Single   Tobacco Use    Smoking status: Never     Passive exposure: Yes    Smokeless tobacco: Never   Substance and Sexual Activity    Alcohol use: No    Drug use: Never    Sexual activity: Yes     Partners: Male      Review of Systems   Constitutional: Negative for chills and fever.   HENT:  Negative for congestion and sore throat.    Eyes:  Negative for discharge and double vision.   Cardiovascular:  Negative for chest pain, palpitations and syncope.   Respiratory:  Negative for cough and shortness of breath.    Endocrine: Negative for cold intolerance and heat intolerance.   Skin:  Negative for dry skin and rash.   Musculoskeletal:  Positive for joint pain, joint swelling and stiffness.   Gastrointestinal:  Negative for abdominal pain, nausea and vomiting.   Neurological:  Negative for focal weakness, numbness and paresthesias.            Other  How many nights a week are you awakened by your affected body part?: 7  Was the patient's HEIGHT measured or patient reported?: Patient Reported  Was the patient's WEIGHT measured or patient reported?: Measured      Objective:        General: Felicity MENJIVAR is well-developed, well-nourished, appears stated age, in no acute distress, alert and oriented to time, place and  person.     General    Nursing note and vitals reviewed.  Constitutional: She is oriented to person, place, and time. She appears well-developed and well-nourished. No distress.   HENT:   Head: Normocephalic and atraumatic.   Nose: Nose normal. Mouth/Throat: No oropharyngeal exudate.   Eyes: EOM are normal. Right eye exhibits no discharge. Left eye exhibits no discharge.   Cardiovascular:  Intact distal pulses.            Pulmonary/Chest: Effort normal and breath sounds normal. No respiratory distress.   Neurological: She is alert and oriented to person, place, and time. She has normal reflexes. No cranial nerve deficit. Coordination normal.   Psychiatric: She has a normal mood and affect. Her behavior is normal. Judgment and thought content normal.     General Musculoskeletal Exam   Gait: normal       Right Knee Exam     Inspection   Erythema: absent  Scars: absent  Swelling: absent  Effusion: absent  Deformity: absent  Bruising: absent    Tenderness   The patient is experiencing no tenderness.     Range of Motion   Extension:  -10   Flexion:  140     Tests   Meniscus   Heaven:  Medial - negative Lateral - negative  Ligament Examination   Lachman: normal (-1 to 2mm)   PCL-Posterior Drawer: normal (0 to 2mm)     MCL - Valgus: normal (0 to 2mm)  LCL - Varus: normal  Posterior Sag Test: negative  Patella   Patellar apprehension: negative  Passive Patellar Tilt: neutral  Patellar Tracking: normal  Patellar Glide (quadrants): Lateral - 2   Medial - 1  Q-Angle at 90 degrees: normal  Patellar Grind: negative    Other   Sensation: normal    Left Knee Exam     Inspection   Erythema: absent  Scars: absent  Swelling: present  Effusion: present  Deformity: absent  Bruising: absent    Tenderness   The patient tender to palpation of the medial joint line and lateral joint line.    Range of Motion   Extension:  5   Flexion:  110     Tests   Meniscus   Heaven:  Medial - positive Lateral - positive  Stability   Lachman:  abnormal  - grade II  PCL-Posterior Drawer: normal (0 to 2mm)  MCL - Valgus: normal (0 to 2mm)  LCL - Varus: abnormal - grade II  Dial Test at 30 degrees: normal (< 5 degrees)  Dial Test at 90 degrees: normal (< 5 degrees)  Posterior Sag Test: negative  Patella   Patellar apprehension: negative  Passive Patellar Tilt: neutral  Patellar Tracking: normal  Patellar Glide (Quadrants): Lateral - 1 Medial - 1  Q-Angle at 90 degrees: normal  Patellar Grind: negative    Other   Sensation: normal    Comments:  Guarded during exam    Muscle Strength   Right Lower Extremity   Quadriceps:  5/5   Hamstrin/5   Left Lower Extremity   Quadriceps:  4/5   Hamstrin/5     Vascular Exam     Right Pulses  Dorsalis Pedis:      2+  Posterior Tibial:      2+        Left Pulses  Dorsalis Pedis:      2+  Posterior Tibial:      2+        Imaging:   X-rays of the bilateral knee from 10/17/2024 personally viewed by me on that day these weight-bearing AP, PA flexion, lateral, Merchant views.  Joint spaces are maintained.  No fracture.  No bony abnormality.      MRI of the left knee from 10/01/2024 personally viewed by me 10/17/2024.  Complete rupture of the ACL.  PCL and medial complex intact.  Lateral collateral ligament appears completely ruptured from the femoral origin in his also strain and lax.  Signal suggesting tear of the posterior horn of the medial meniscus.  This is a horizontal type tear.  Lateral meniscus with a apparent radial tear in the midbody.  Cartilage preserved in all 3 compartments.          Assessment:     Felicity Cohen is a 24 y.o. female with multi ligamentous injury of the ACL, posterolateral corner, and medial and lateral menisci of the left knee.  Encounter Diagnoses   Name Primary?    Rupture of anterior cruciate ligament of left knee, initial encounter Yes    Tear of lateral collateral ligament of left knee, initial encounter     Acute medial meniscal injury of left knee, subsequent encounter            Plan:         The diagnosis treatment options were discussed with the patient all her questions were answered.  I showed her the x-rays and the MRI and reviewed the findings with her.      The treatment options for injury to the ACL were discussed at length with the patient and all of their questions were answered.  First we discussed non operative management.  I explained that this would include a course of physical therapy to work on knee, hip, core, and leg strengthening.  The goal this would be to improve the stability of the knee.  I explained that I would see the patient back for repeat evaluation to assess the stability of the knee.  If they were to have persistent instability of the knee that they do not feel like they can not live with, we would further discuss operative intervention.  We also discussed operative intervention.  I explained this would be ACL reconstruction.  Graft options including allograft and autograft options of hamstring tendon, bone patellar tendon bone, and quadriceps tendon were discussed at length and all their questions were answered.  The risks and benefits of each graft were also discussed.  After this discussion, they elected to proceed with Quadriceps Autograft.  After this discussion, she elected proceed with quadriceps tendon autograft ACL reconstruction.  Additionally, for the lateral collateral ligament and posterolateral corner, I stated this would be evaluated and we could potentially perform repair.  However, we would likely perform posterolateral corner reconstruction using allograft to allow for optimal outcomes.  Additionally, she was hyperextension on the contralateral knee and seems relatively ligamentously lax.  We will have lateral extra-articular tenodesis to enhance knee stabilization.  I explained the medial and lateral menisci would be examined thoroughly and partial meniscectomy versus repair would be performed as deemed appropriate.  The differences in the  rehabilitation protocols were discussed and all their questions were answered.      We will plan on proceeding with ACL reconstruction with Quadriceps Autograft posterolateral corner reconstruction with allograft, lateral extra-articular tenodesis, and medial and lateral meniscal repairs on 12/12/2024.  she does not need preoperative clearance from their PCP.  We will use Eliquis for DVT prophylaxis.    The surgery we will delayed until early to mid December given her school schedule as well as the need for physical therapy for prehab.  We will set up for physical therapy.  Additionally, we will have her fit for a T scope brace today.    [id number] - we performed a custom orthotic / brace adjustment, fitting and training with the patient. The patient demonstrated understanding and proper care. This was performed for 15 minutes.      The risks, benefits and alternatives to surgery were discussed with the patient at great length.  These include bleeding, infection, vessel/nerve damage, pain, numbness, tingling, complex regional pain syndrome, hardware/surgical failure, need for further surgery, graft failure, graft retear, cosmetic deformity, failure of repair of other structures, damage to surrounding neurovascular structures, persistent instability, DVT, PE, arthritis and death.  Patient states an understanding and wishes to proceed with surgery.   All questions were answered.  No guarantees were implied or stated.                   [facility] patient questionnaires have been collected today.

## 2024-10-24 ENCOUNTER — CLINICAL SUPPORT (OUTPATIENT)
Dept: REHABILITATION | Facility: HOSPITAL | Age: 24
End: 2024-10-24
Attending: STUDENT IN AN ORGANIZED HEALTH CARE EDUCATION/TRAINING PROGRAM
Payer: MEDICAID

## 2024-10-24 DIAGNOSIS — S83.8X2D ACUTE MEDIAL MENISCAL INJURY OF LEFT KNEE, SUBSEQUENT ENCOUNTER: ICD-10-CM

## 2024-10-24 DIAGNOSIS — S83.422A TEAR OF LATERAL COLLATERAL LIGAMENT OF LEFT KNEE, INITIAL ENCOUNTER: ICD-10-CM

## 2024-10-24 DIAGNOSIS — S83.512A RUPTURE OF ANTERIOR CRUCIATE LIGAMENT OF LEFT KNEE, INITIAL ENCOUNTER: ICD-10-CM

## 2024-10-24 DIAGNOSIS — M25.562 ACUTE PAIN OF LEFT KNEE: Primary | ICD-10-CM

## 2024-10-24 PROBLEM — S83.8X2A ACUTE MEDIAL MENISCAL INJURY OF LEFT KNEE: Status: ACTIVE | Noted: 2024-10-24

## 2024-10-24 PROCEDURE — 97161 PT EVAL LOW COMPLEX 20 MIN: CPT

## 2024-10-24 PROCEDURE — 97110 THERAPEUTIC EXERCISES: CPT

## 2024-10-24 NOTE — PLAN OF CARE
[facility]  Physical Therapy Initial Evaluation    Name: Felicity Cohen  Clinic Number: [id number]    Therapy Diagnosis:   Encounter Diagnoses   Name Primary?    Rupture of anterior cruciate ligament of left knee, initial encounter     Tear of lateral collateral ligament of left knee, initial encounter     Acute medial meniscal injury of left knee, subsequent encounter     Acute pain of left knee Yes     Physician: Jhony Berry MD    Physician Orders: PT Eval and Treat   Medical Diagnosis from Referral:   S83.512A (ICD-10-CM) - Rupture of anterior cruciate ligament of left knee, initial encounter   S83.422A (ICD-10-CM) - Tear of lateral collateral ligament of left knee, initial encounter   S83.8X2D (ICD-10-CM) - Acute medial meniscal injury of left knee, subsequent encounter     Evaluation Date: 10/24/2024  Authorization Period Expiration: 12/31/24  Plan of Care Expiration: 12/12/24  Visit # / Visits authorized: 1/ 20   FOTO: 1/3    Time In: 1005 am  Time Out: 1050  Total Billable Time: 45 minutes    Precautions: Standard    Subjective   Date of onset: 9/10/24  History of current condition - Felicity reports: 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 Her L knee went numb and she had tingling and had pain instantly. She first went to the ER and then followed up with ortho. She had MRI and was dx with ACL, LCL and medial meniscus tear on L knee. 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        Past Medical History:   Diagnosis Date    ADHD (attention deficit hyperactivity disorder)     Anxiety     Constipation     Depression     GERD (gastroesophageal reflux disease)     IBS (irritable bowel syndrome)     Ovarian cyst     Schizophrenia in children      Felicity Cohen  has a past surgical history that includes [place] tooth extraction and Esophagogastroduodenoscopy (N/A, 2/8/2022).    Felicity MENJIVAR has a current  "medication list which includes the following prescription(s): biotin, ergocalciferol, ergocalciferol, lactobacillus rhamnosus gg, mupirocin, omega-3/dha/epa/ala/vitamin d3, polyethylene glycol, and triamcinolone acetonide 0.1%.    Review of patient's allergies indicates:   Allergen Reactions    Ibuprofen Hives        Imaging, MRI studies    Prior Therapy: none  Social History: she lives with their family  Occupation: nursing school  Prior Level of Function: walking and lifting  Current Level of Function: limited secondary to pain and ROM    Pain:  Current 5/10, worst 8/10, best 5/10   Location: left knee   Description: Aching and Burning  Aggravating Factors: Bending, Walking, Night Time, and Extension  Easing Factors: ice    Pts goals: "to be able to work normally"    Objective     Observation: pt appears stated age, A&O x3      Posture: decreased L knee ext      Gait: 4 pt cane with antaglic gait     Range of Motion: (PROM):    Knee Left Right   Extension  (0) degrees P!  (+10 hyper) degrees   Flexion  (75) degrees P!  (110) degrees           Strength:  Hip Left Right   Flexion 5/5 5/5   Abduction 3/5 3+/5     Knee Left Right   Extension 4+/5 5/5   Flexion 3+/5 5/5         Special Tests:    Knee Left   lachmans Positive   varus positive   valgus Positive   Post. drawer Negative       Joint Mobility: WNL L patellar mobilization    Palpation: no TTP noted    Sensation: WNL BLE    Flexibility: tight quad and HS on L      Intake Outcome Measure for FOTO Knee Survey    Therapist reviewed FOTO scores for Felicity Cohen on 10/24/2024.   FOTO documents entered into [email or web address] - see Media section.         TREATMENT   Treatment Time In: 1025  Treatment Time Out: 1050  Total Treatment time separate from Evaluation: 25 minutes    Felicity received therapeutic exercises to develop education for 10 minutes including:  Quad sets 10 sec x10  Heel slides x10 with 5 sec hold  Clams rtb B x10 with 5 sec hold  FOTO  Gait training  Pt " education and HEP review    *billed per medicaid guidelines    Home Exercises and Patient Education Provided    Education provided re: HEP to Go    Written Home Exercises Provided: yes.  Exercises were reviewed and Felicity was able to demonstrate them prior to the end of the session.   Pt received a written copy of exercises to perform at home. Felicity demonstrated good  understanding of the education provided.     See EMR under patient instructions for exercises given.   Assessment   Felicity MENJIVAR is a 24 y.o. female referred to outpatient Physical Therapy with a medical diagnosis of L ACL, LCL and medial meniscus tears. Pt presents with decreased L knee ROM, strength, and limiting ADLs.    Pt prognosis is Good.   Pt will benefit from skilled outpatient Physical Therapy to address the deficits stated above and in the chart below, provide pt/family education, and to maximize pt's level of independence.     Plan of care discussed with patient: Yes  Pt's spiritual, cultural and educational needs considered and patient is agreeable to the plan of care and goals as stated below:     Anticipated Barriers for therapy: none    Medical Necessity is demonstrated by the following  History  Co-morbidities and personal factors that may impact the plan of care [x] LOW: no personal factors / co-morbidities  [] MODERATE: 1-2 personal factors / co-morbidities  [] HIGH: 3+ personal factors / co-morbidities    Moderate / High Support Documentation:   Co-morbidities affecting plan of care:     Personal Factors:   no deficits     Examination  Body Structures and Functions, activity limitations and participation restrictions that may impact the plan of care [x] LOW: addressing 1-2 elements  [] MODERATE: 3+ elements  [] HIGH: 4+ elements (please support below)    Moderate / High Support Documentation:      Clinical Presentation [x] LOW: stable  [] MODERATE: Evolving  [] HIGH: Unstable     Decision Making/ Complexity Score: low        Goals:  Short Term Goals: 4 weeks   - Pt will increase ROM to +5 L knee hyperextension, 100 deg flex  - Pt will increase strength to 4+/5 L knee flex and ext painfree  - Decrease Pain to 3/10 as worst with all PT interventions  - Pt to self correct posture and gait with no AD and minimal cues  - Pt independent with HEP with progressions.     Long Term Goals (8 Weeks):  - Pt will increase ROM to 0 deg L knee hyperextension and 110 deg flexion  - Pt will increase strength to 5/5 L knee in all planes  - Decrease Pain to 2/10 as worst with stairs and walking  - Pt to return to 70% PLOF      Plan   Plan of care Certification: 10/24/2024 to 12/12/24.    Outpatient Physical Therapy 2 times weekly for 8 weeks to include the following interventions: Gait Training, Manual Therapy, Moist Heat/ Ice, Neuromuscular Re-ed, Patient Education, Therapeutic Activities, and Therapeutic Exercise.     Deborah Coe, PT, DPT, OCS, COMT

## 2024-10-30 ENCOUNTER — CLINICAL SUPPORT (OUTPATIENT)
Dept: REHABILITATION | Facility: HOSPITAL | Age: 24
End: 2024-10-30
Payer: MEDICAID

## 2024-10-30 DIAGNOSIS — S89.92XA INJURY OF POSTEROLATERAL CORNER OF LEFT KNEE, INITIAL ENCOUNTER: ICD-10-CM

## 2024-10-30 DIAGNOSIS — S83.422A TEAR OF LATERAL COLLATERAL LIGAMENT OF LEFT KNEE, INITIAL ENCOUNTER: ICD-10-CM

## 2024-10-30 DIAGNOSIS — M65.962 SYNOVITIS OF LEFT KNEE: ICD-10-CM

## 2024-10-30 DIAGNOSIS — M25.562 ACUTE PAIN OF LEFT KNEE: Primary | ICD-10-CM

## 2024-10-30 DIAGNOSIS — S83.512A RUPTURE OF ANTERIOR CRUCIATE LIGAMENT OF LEFT KNEE, INITIAL ENCOUNTER: Primary | ICD-10-CM

## 2024-10-30 DIAGNOSIS — S83.272A COMPLEX TEAR OF LATERAL MENISCUS OF LEFT KNEE AS CURRENT INJURY, INITIAL ENCOUNTER: ICD-10-CM

## 2024-10-30 DIAGNOSIS — S83.8X2D ACUTE MEDIAL MENISCAL INJURY OF LEFT KNEE, SUBSEQUENT ENCOUNTER: ICD-10-CM

## 2024-10-30 PROCEDURE — 97110 THERAPEUTIC EXERCISES: CPT

## 2024-11-06 ENCOUNTER — CLINICAL SUPPORT (OUTPATIENT)
Dept: REHABILITATION | Facility: HOSPITAL | Age: 24
End: 2024-11-06
Payer: MEDICAID

## 2024-11-06 DIAGNOSIS — M25.562 ACUTE PAIN OF LEFT KNEE: Primary | ICD-10-CM

## 2024-11-06 PROCEDURE — 97110 THERAPEUTIC EXERCISES: CPT

## 2024-11-06 NOTE — PROGRESS NOTES
"                          Physical Therapy Daily Treatment Note     Name: Felicity Cohen  Clinic Number: [id number]    Therapy Diagnosis:   Encounter Diagnosis   Name Primary?    Acute pain of left knee Yes     Physician: Jhony Berry MD    Visit Date: 11/6/2024    Physician Orders: PT Eval and Treat   Medical Diagnosis from Referral:   S83.512A (ICD-10-CM) - Rupture of anterior cruciate ligament of left knee, initial encounter   S83.422A (ICD-10-CM) - Tear of lateral collateral ligament of left knee, initial encounter   S83.8X2D (ICD-10-CM) - Acute medial meniscal injury of left knee, subsequent encounter      Evaluation Date: 10/24/2024  Authorization Period Expiration: 12/31/24  Plan of Care Expiration: 12/12/24  Visit # / Visits authorized: 2/ 12   FOTO: 1/3     Time In: 130 pm  Time Out: 225 pm  Total Billable Time: 55 minutes     Precautions: Standard  Subjective      Pt reports:is walking a little better. Still using quad cane.    she was compliant with home exercise program given last session.   Response to previous treatment:NA  Functional change: NA    Pain: not quantified this visit/10  Location: left knee      Objective   Range of Motion: (PROM): 10/30/24     Knee Left Right   Extension  (0) degrees P!  (+10 hyper) degrees   Flexion  (95) degrees P!  (110) degrees      Felicity received therapeutic exercises to develop strength, endurance, ROM, flexibility, and core stabilization for 55 minutes including:  Recumbent bike x5 min (in available ROM)  Heel prop with 3# x5 min  Quad sets series (full bolster, 1/2, towel) x30 with 3 sec hold 1#  Heel slides 2x10 with 5 sec hold  Clams rtb B 2x15 with 5 sec hold-np  Bridges rtb 2x10-np  SLR, SL hip abd and standing hip ext 2x10 ea  LAQ with ball squeeze 3x10 to tempo-np  STS 24" 2x10-np  FOTO-np  Gait training-np  Pt education and HEP review    *billed per medicaid guidelines      Home Exercises Provided and Patient Education Provided     Education " provided:   - HEP to Go    Written Home Exercises Provided: Patient instructed to cont prior HEP.  Exercises were reviewed and Felicity was able to demonstrate them prior to the end of the session.  Felicity demonstrated good  understanding of the education provided.     See EMR under Patient Instructions for exercises provided     Assessment     Pt demonstrated improved knee flexion and better quad activation with low weight well. Pt provided updated HEP for leg raises.  Plan to continue progressing as tolerated.   Felicity Is progressing well towards her goals.   Pt prognosis is Good.     Pt will continue to benefit from skilled outpatient physical therapy to address the deficits listed in the problem list box on initial evaluation, provide pt/family education and to maximize pt's level of independence in the home and community environment.     Pt's spiritual, cultural and educational needs considered and pt agreeable to plan of care and goals.    Anticipated barriers to physical therapy: none    Goals:   Short Term Goals: 4 weeks (Progressing, not met)  - Pt will increase ROM to +5 L knee hyperextension, 100 deg flex  - Pt will increase strength to 4+/5 L knee flex and ext painfree  - Decrease Pain to 3/10 as worst with all PT interventions  - Pt to self correct posture and gait with no AD and minimal cues  - Pt independent with HEP with progressions.      Long Term Goals (8 Weeks): (Progressing, not met)  - Pt will increase ROM to 0 deg L knee hyperextension and 110 deg flexion  - Pt will increase strength to 5/5 L knee in all planes  - Decrease Pain to 2/10 as worst with stairs and walking  - Pt to return to 70% PLOF    Plan     Continue POC per pt care.     Deborah Coe, PT, DPT, OCS, COMT

## 2024-11-13 ENCOUNTER — CLINICAL SUPPORT (OUTPATIENT)
Dept: REHABILITATION | Facility: HOSPITAL | Age: 24
End: 2024-11-13
Payer: MEDICAID

## 2024-11-13 DIAGNOSIS — M25.562 ACUTE PAIN OF LEFT KNEE: Primary | ICD-10-CM

## 2024-11-13 PROCEDURE — 97110 THERAPEUTIC EXERCISES: CPT

## 2024-11-13 NOTE — PROGRESS NOTES
"                          Physical Therapy Daily Treatment Note     Name: Felicity Cohen  Clinic Number: [id number]    Therapy Diagnosis:   Encounter Diagnosis   Name Primary?    Acute pain of left knee Yes     Physician: Jhony Berry MD    Visit Date: 11/13/2024    Physician Orders: PT Eval and Treat   Medical Diagnosis from Referral:   S83.512A (ICD-10-CM) - Rupture of anterior cruciate ligament of left knee, initial encounter   S83.422A (ICD-10-CM) - Tear of lateral collateral ligament of left knee, initial encounter   S83.8X2D (ICD-10-CM) - Acute medial meniscal injury of left knee, subsequent encounter      Evaluation Date: 10/24/2024  Authorization Period Expiration: 12/31/24  Plan of Care Expiration: 12/12/24  Visit # / Visits authorized: 3/ 12   FOTO: 1/3     Time In: 1100 am  Time Out: 1155 pm  Total Billable Time: 55 minutes (PT extender used in line of sight)     Precautions: Standard  Subjective      Pt reports feeling less pain with L knee ROM.   she was compliant with home exercise program given last session.   Response to previous treatment:NA  Functional change: NA    Pain: not quantified this visit/10  Location: left knee      Objective   Range of Motion: (PROM): 11/13/24     Knee Left Right   Extension  (+5 hyper) degrees   (+10 hyper) degrees   Flexion  (100) degrees   (110) degrees      Felicity received therapeutic exercises to develop strength, endurance, ROM, flexibility, and core stabilization for 55 minutes including:  Recumbent bike x6 min (in available ROM)  Heel prop with 3# x5 min  Quad sets series (full bolster, 1/2, towel) x30 with 3 sec hold 1#  Heel slides x5 min  Clams rtb B 2x15 with 5 sec hold-np  Bridges rtb 2x10-np  SLR, SL hip abd and standing hip ext 2x10 ea 1#  LAQ with ball squeeze 3x10 to tempo 1#  STS 24" 2x10-np  FOTO-np  Gait training-np  Pt education and HEP review    *billed per medicaid guidelines      Home Exercises Provided and Patient Education Provided "     Education provided:   - HEP to Go    Written Home Exercises Provided: Patient instructed to cont prior HEP.  Exercises were reviewed and Felicity was able to demonstrate them prior to the end of the session.  Felicity demonstrated good  understanding of the education provided.     See EMR under Patient Instructions for exercises provided     Assessment     Pt continues to present to clinic with improved L knee ROM, less compensatory gait patterns and improved pain levels.   Plan to continue progressing as tolerated.   Felicity Is progressing well towards her goals.   Pt prognosis is Good.     Pt will continue to benefit from skilled outpatient physical therapy to address the deficits listed in the problem list box on initial evaluation, provide pt/family education and to maximize pt's level of independence in the home and community environment.     Pt's spiritual, cultural and educational needs considered and pt agreeable to plan of care and goals.    Anticipated barriers to physical therapy: none    Goals:   Short Term Goals: 4 weeks (Progressing, not met)  - Pt will increase ROM to +5 L knee hyperextension, 100 deg flex- met  - Pt will increase strength to 4+/5 L knee flex and ext painfree  - Decrease Pain to 3/10 as worst with all PT interventions  - Pt to self correct posture and gait with no AD and minimal cues  - Pt independent with HEP with progressions.      Long Term Goals (8 Weeks): (Progressing, not met)  - Pt will increase ROM to 0 deg L knee hyperextension and 110 deg flexion  - Pt will increase strength to 5/5 L knee in all planes  - Decrease Pain to 2/10 as worst with stairs and walking  - Pt to return to 70% PLOF    Plan     Continue POC per pt care.     Deborah Coe, PT, DPT, OCS, COMT

## 2024-11-20 ENCOUNTER — CLINICAL SUPPORT (OUTPATIENT)
Dept: REHABILITATION | Facility: HOSPITAL | Age: 24
End: 2024-11-20
Payer: MEDICAID

## 2024-11-20 DIAGNOSIS — M25.562 ACUTE PAIN OF LEFT KNEE: Primary | ICD-10-CM

## 2024-11-20 PROCEDURE — 97110 THERAPEUTIC EXERCISES: CPT

## 2024-11-20 NOTE — PROGRESS NOTES
"                          Physical Therapy Daily Treatment Note     Name: Felicity Cohen  Clinic Number: [id number]    Therapy Diagnosis:   Encounter Diagnosis   Name Primary?    Acute pain of left knee Yes     Physician: Jhony Berry MD    Visit Date: 11/20/2024    Physician Orders: PT Eval and Treat   Medical Diagnosis from Referral:   S83.512A (ICD-10-CM) - Rupture of anterior cruciate ligament of left knee, initial encounter   S83.422A (ICD-10-CM) - Tear of lateral collateral ligament of left knee, initial encounter   S83.8X2D (ICD-10-CM) - Acute medial meniscal injury of left knee, subsequent encounter      Evaluation Date: 10/24/2024  Authorization Period Expiration: 12/31/24  Plan of Care Expiration: 12/12/24  Visit # / Visits authorized: 3/ 12   FOTO: 1/3     Time In: 1100 am  Time Out: 1155 pm  Total Billable Time: 55 minutes (PT extender used in line of sight)     Precautions: Standard  Subjective      Pt reports had a flare up.    she was compliant with home exercise program given last session.   Response to previous treatment:NA  Functional change: NA    Pain: not quantified this visit/10  Location: left knee      Objective   Range of Motion: (PROM): 11/13/24     Knee Left Right   Extension  (+5 hyper) degrees   (+10 hyper) degrees   Flexion  (100) degrees   (110) degrees      Felicity received therapeutic exercises to develop strength, endurance, ROM, flexibility, and core stabilization for 55 minutes including:  Recumbent bike x6 min (in available ROM)-np  Knee prop with MHP x10 min  Quad sets with NMES x10 min (10 sec on/10 sec off)  Heel slides 2x10 with 5 sec hold  Clams rtb B 2x15 with 5 sec hold-np  Bridges rtb 2x10-np  SLR, SL hip abd and standing hip ext 2x10 ea 2#  LAQ with ball squeeze 3x10 to tempo 2#  STS 24" 2x10-np  Prone quad stretch 10 sec x10  FOTO-np  Gait training-np  Pt education and HEP review    *billed per medicaid guidelines      Home Exercises Provided and Patient " Education Provided     Education provided:   - HEP to Go    Written Home Exercises Provided: Patient instructed to cont prior HEP.  Exercises were reviewed and Felicity was able to demonstrate them prior to the end of the session.  Felicity demonstrated good  understanding of the education provided.     See EMR under Patient Instructions for exercises provided     Assessment     Pt treatment not progressed secondary to increased pain and swelling upon arrival. She responded well to NMES and quad ROM interventions and noted decreased tension post treatment.   Plan to continue progressing as tolerated.   Felicity Is progressing well towards her goals.   Pt prognosis is Good.     Pt will continue to benefit from skilled outpatient physical therapy to address the deficits listed in the problem list box on initial evaluation, provide pt/family education and to maximize pt's level of independence in the home and community environment.     Pt's spiritual, cultural and educational needs considered and pt agreeable to plan of care and goals.    Anticipated barriers to physical therapy: none    Goals:   Short Term Goals: 4 weeks (Progressing, not met)  - Pt will increase ROM to +5 L knee hyperextension, 100 deg flex- met  - Pt will increase strength to 4+/5 L knee flex and ext painfree  - Decrease Pain to 3/10 as worst with all PT interventions  - Pt to self correct posture and gait with no AD and minimal cues  - Pt independent with HEP with progressions.      Long Term Goals (8 Weeks): (Progressing, not met)  - Pt will increase ROM to 0 deg L knee hyperextension and 110 deg flexion  - Pt will increase strength to 5/5 L knee in all planes  - Decrease Pain to 2/10 as worst with stairs and walking  - Pt to return to 70% PLOF    Plan     Continue POC per pt care.     Deborah Coe, PT, DPT, OCS, COMT

## 2024-11-26 ENCOUNTER — CLINICAL SUPPORT (OUTPATIENT)
Dept: REHABILITATION | Facility: HOSPITAL | Age: 24
End: 2024-11-26
Payer: MEDICAID

## 2024-11-26 DIAGNOSIS — M25.562 ACUTE PAIN OF LEFT KNEE: Primary | ICD-10-CM

## 2024-11-26 PROCEDURE — 97110 THERAPEUTIC EXERCISES: CPT

## 2024-11-26 NOTE — PROGRESS NOTES
"                          Physical Therapy Daily Treatment Note     Name: Felicity Cohen  Clinic Number: [id number]    Therapy Diagnosis:   Encounter Diagnosis   Name Primary?    Acute pain of left knee Yes     Physician: Jhony Berry MD    Visit Date: 11/26/2024    Physician Orders: PT Eval and Treat   Medical Diagnosis from Referral:   S83.512A (ICD-10-CM) - Rupture of anterior cruciate ligament of left knee, initial encounter   S83.422A (ICD-10-CM) - Tear of lateral collateral ligament of left knee, initial encounter   S83.8X2D (ICD-10-CM) - Acute medial meniscal injury of left knee, subsequent encounter      Evaluation Date: 10/24/2024  Authorization Period Expiration: 12/31/24  Plan of Care Expiration: 12/12/24  Visit # / Visits authorized: 5/ 12   FOTO: 1/3     Time In: 1100 am  Time Out: 1150 pm  Total Billable Time: 50 minutes (PT extender used in line of sight)     Precautions: Standard  Subjective      Pt reports doing a little better, trying hard at home with exercises.    she was compliant with home exercise program given last session.   Response to previous treatment:NA  Functional change: NA    Pain: not quantified this visit/10  Location: left knee      Objective   Range of Motion: (PROM): 11/13/24     Knee Left Right   Extension  (+5 hyper) degrees   (+10 hyper) degrees   Flexion  (100) degrees   (110) degrees      Felicity received therapeutic exercises to develop strength, endurance, ROM, flexibility, and core stabilization for 50 minutes including:  Recumbent bike x6 min (in available ROM)-  Knee prop with 4# x5 min  Quad sets with NMES x10 min (10 sec on/10 sec off)-np  Heel slides x5 min   Clams rtb B 2x15 with 5 sec hold-np  Bridges rtb 2x10-np  SLR, SL hip abd and standing hip ext 2x10 ea 2#  LAQ with ball squeeze 3x10 to tempo 2#  STS 24" 2x10-np  Prone quad stretch 10 sec x10-np  FOTO-np  Gait training-np  Pt education and HEP review    *billed per medicaid guidelines      Home " Exercises Provided and Patient Education Provided     Education provided:   - HEP to Go    Written Home Exercises Provided: Patient instructed to cont prior HEP.  Exercises were reviewed and Felicity was able to demonstrate them prior to the end of the session.  Felicity demonstrated good  understanding of the education provided.     See EMR under Patient Instructions for exercises provided     Assessment     Pt able to progress interventions with more resistance well, but started showing fatigue toward the end of repetitions. Pt knew to discontinue to avoid compensatory patterns.   Plan to continue progressing as tolerated.   Felicity Is progressing well towards her goals.   Pt prognosis is Good.     Pt will continue to benefit from skilled outpatient physical therapy to address the deficits listed in the problem list box on initial evaluation, provide pt/family education and to maximize pt's level of independence in the home and community environment.     Pt's spiritual, cultural and educational needs considered and pt agreeable to plan of care and goals.    Anticipated barriers to physical therapy: none    Goals:   Short Term Goals: 4 weeks (Progressing, not met)  - Pt will increase ROM to +5 L knee hyperextension, 100 deg flex- met  - Pt will increase strength to 4+/5 L knee flex and ext painfree  - Decrease Pain to 3/10 as worst with all PT interventions  - Pt to self correct posture and gait with no AD and minimal cues  - Pt independent with HEP with progressions.      Long Term Goals (8 Weeks): (Progressing, not met)  - Pt will increase ROM to 0 deg L knee hyperextension and 110 deg flexion  - Pt will increase strength to 5/5 L knee in all planes  - Decrease Pain to 2/10 as worst with stairs and walking  - Pt to return to 70% PLOF    Plan     Continue POC per pt care.     Deborah Coe, PT, DPT, OCS, COMT

## 2024-12-02 ENCOUNTER — CLINICAL SUPPORT (OUTPATIENT)
Dept: REHABILITATION | Facility: HOSPITAL | Age: 24
End: 2024-12-02
Payer: MEDICAID

## 2024-12-02 DIAGNOSIS — M25.562 ACUTE PAIN OF LEFT KNEE: Primary | ICD-10-CM

## 2024-12-02 PROCEDURE — 97110 THERAPEUTIC EXERCISES: CPT | Mod: CQ

## 2024-12-02 NOTE — PROGRESS NOTES
"                          Physical Therapy Daily Treatment Note     Name: Felicity Cohen  Clinic Number: [id number]    Therapy Diagnosis:   Encounter Diagnosis   Name Primary?    Acute pain of left knee Yes     Physician: Jhony Berry MD    Visit Date: 12/2/2024    Physician Orders: PT Eval and Treat   Medical Diagnosis from Referral:   S83.512A (ICD-10-CM) - Rupture of anterior cruciate ligament of left knee, initial encounter   S83.422A (ICD-10-CM) - Tear of lateral collateral ligament of left knee, initial encounter   S83.8X2D (ICD-10-CM) - Acute medial meniscal injury of left knee, subsequent encounter      Evaluation Date: 10/24/2024  Authorization Period Expiration: 12/31/24  Plan of Care Expiration: 12/12/24  Visit # / Visits authorized: 5/ 12   FOTO: 1/3     Time In: 4:35 p  Time Out: 5:30 pm  Total Billable Time: 23 minutes      Precautions: Standard  Subjective      Pt reports no new complaint   she was compliant with home exercise program given last session.   Response to previous treatment:NA  Functional change: NA    Pain: not quantified this visit/10  Location: left knee      Objective   Range of Motion: (PROM): 11/13/24     Knee Left Right   Extension  (+5 hyper) degrees   (+10 hyper) degrees   Flexion  (100) degrees   (110) degrees      Felicity received therapeutic exercises to develop strength, endurance, ROM, flexibility, and core stabilization for 55 minutes including:    Knee prop with 4# x5 min  Heel slides with QS  x5 min   SLR, SL hip abd  3x10 ea 2#  Prone hip ext 3x10  LAQ with ball squeeze 3x10 to tempo 2#  DL shuttle 112.5# 4x10      Np:  Recumbent bike x6 min (in available ROM)-  Quad sets with NMES x10 min (10 sec on/10 sec off)-np  Clams rtb B 2x15 with 5 sec hold-np  Bridges rtb 2x10-np  STS 24" 2x10-np  Prone quad stretch 10 sec x10-np  FOTO-np  Gait training-np  Pt education and HEP review    *billed per medicaid guidelines      Home Exercises Provided and Patient Education " Provided     Education provided:   - HEP to Go    Written Home Exercises Provided: Patient instructed to cont prior HEP.  Exercises were reviewed and Felicity was able to demonstrate them prior to the end of the session.  Felicity demonstrated good  understanding of the education provided.     See EMR under Patient Instructions for exercises provided     Assessment     Bike not performed today as pt states her knee hurt last time doing this exercise. Added functional squatting with shuttle to day with good tolerance. Cueing needed to maintain TKE with SLR. Educated pt on what she can do outside of PT to improve ROM and strength while preparing for her surgery next week.   Felicity Is progressing well towards her goals.   Pt prognosis is Good.     Pt will continue to benefit from skilled outpatient physical therapy to address the deficits listed in the problem list box on initial evaluation, provide pt/family education and to maximize pt's level of independence in the home and community environment.     Pt's spiritual, cultural and educational needs considered and pt agreeable to plan of care and goals.    Anticipated barriers to physical therapy: none    Goals:   Short Term Goals: 4 weeks (Progressing, not met)  - Pt will increase ROM to +5 L knee hyperextension, 100 deg flex- met  - Pt will increase strength to 4+/5 L knee flex and ext painfree  - Decrease Pain to 3/10 as worst with all PT interventions  - Pt to self correct posture and gait with no AD and minimal cues  - Pt independent with HEP with progressions.      Long Term Goals (8 Weeks): (Progressing, not met)  - Pt will increase ROM to 0 deg L knee hyperextension and 110 deg flexion  - Pt will increase strength to 5/5 L knee in all planes  - Decrease Pain to 2/10 as worst with stairs and walking  - Pt to return to 70% PLOF    Plan     Continue POC per pt care.     Edwina Kelly, PTA, DPT, OCS, COMT

## 2024-12-03 ENCOUNTER — OFFICE VISIT (OUTPATIENT)
Dept: SPORTS MEDICINE | Facility: CLINIC | Age: 24
End: 2024-12-03
Payer: MEDICAID

## 2024-12-03 VITALS
SYSTOLIC BLOOD PRESSURE: 128 MMHG | HEART RATE: 103 BPM | DIASTOLIC BLOOD PRESSURE: 90 MMHG | BODY MASS INDEX: 48.82 KG/M2 | WEIGHT: 293 LBS | HEIGHT: 65 IN

## 2024-12-03 DIAGNOSIS — S83.512A RUPTURE OF ANTERIOR CRUCIATE LIGAMENT OF LEFT KNEE, INITIAL ENCOUNTER: Primary | ICD-10-CM

## 2024-12-03 PROCEDURE — 99214 OFFICE O/P EST MOD 30 MIN: CPT | Mod: PBBFAC | Performed by: ORTHOPAEDIC SURGERY

## 2024-12-03 PROCEDURE — 99999 PR PBB SHADOW E&M-EST. PATIENT-LVL IV: CPT | Mod: PBBFAC,,, | Performed by: ORTHOPAEDIC SURGERY

## 2024-12-03 PROCEDURE — 99499 UNLISTED E&M SERVICE: CPT | Mod: S$PBB,,, | Performed by: ORTHOPAEDIC SURGERY

## 2024-12-03 RX ORDER — ASPIRIN 81 MG/1
81 TABLET ORAL DAILY
Qty: 42 TABLET | Refills: 0 | Status: SHIPPED | OUTPATIENT
Start: 2024-12-03 | End: 2025-01-14

## 2024-12-03 RX ORDER — CELECOXIB 200 MG/1
200 CAPSULE ORAL 2 TIMES DAILY WITH MEALS
Qty: 60 CAPSULE | Refills: 0 | Status: SHIPPED | OUTPATIENT
Start: 2024-12-03

## 2024-12-03 RX ORDER — SODIUM CHLORIDE 9 MG/ML
INJECTION, SOLUTION INTRAVENOUS CONTINUOUS
OUTPATIENT
Start: 2024-12-03

## 2024-12-03 RX ORDER — OXYCODONE HYDROCHLORIDE 5 MG/1
5 TABLET ORAL EVERY 6 HOURS PRN
Qty: 28 TABLET | Refills: 0 | Status: SHIPPED | OUTPATIENT
Start: 2024-12-03

## 2024-12-03 RX ORDER — METHOCARBAMOL 500 MG/1
500 TABLET, FILM COATED ORAL 3 TIMES DAILY PRN
Qty: 30 TABLET | Refills: 0 | Status: SHIPPED | OUTPATIENT
Start: 2024-12-03

## 2024-12-03 RX ORDER — PROMETHAZINE HYDROCHLORIDE 25 MG/1
25 TABLET ORAL EVERY 6 HOURS PRN
Qty: 30 TABLET | Refills: 0 | Status: SHIPPED | OUTPATIENT
Start: 2024-12-03

## 2024-12-03 NOTE — H&P (VIEW-ONLY)
Felicity Cohen  is here for a completion of her perioperative paperwork. she  Is scheduled to undergo:    LEFT Knee  Arthroscopy   Anterior cruciate ligament reconstruction with quadriceps tendon autograft   Possible medial/lateral meniscus repair versus partial meniscectomy   Posterolateral corner reconstruction with allograft   Lateral extra-articular tenodesis   All other indicated procedures     on 12/12/2024.  She is a healthy individual and does not need clearance for this procedure.     Risks, indications and benefits of the surgical procedure were discussed with the patient. All questions with regard to surgery, rehab, expected return to functional activities, activities of daily living and recreational endeavors were answered to her satisfaction.    Discussed COVID-19 with the patient, they are aware of our current policies and procedures, were given the option of delaying surgery, and they elect to proceed.    Patient was informed and understands the risks of surgery are greater for patients with a current condition or history of heart disease, obesity, clotting disorders, recurrent infections, steroid use, current or past smoking, and factors such as sedentary lifestyle and noncompliance with medications, therapy or follow-up. The degree of the increased risk is hard to estimate with any degree of precision.    Once no other questions were asked, a brief history and physical exam was then performed.    PAST MEDICAL HISTORY:   Past Medical History:   Diagnosis Date    ADHD (attention deficit hyperactivity disorder)     Anxiety     Constipation     Depression     GERD (gastroesophageal reflux disease)     IBS (irritable bowel syndrome)     Ovarian cyst     Schizophrenia in children      PAST SURGICAL HISTORY:   Past Surgical History:   Procedure Laterality Date    ESOPHAGOGASTRODUODENOSCOPY N/A 2/8/2022    Procedure: EGD (ESOPHAGOGASTRODUODENOSCOPY);  Surgeon: Berkley Mckenna MD;  Location: [facility]  ENDO;  Service: Endoscopy;  Laterality: N/A;    WISDOM TOOTH EXTRACTION       FAMILY HISTORY:   Family History   Problem Relation Name Age of Onset    Asthma Mother      Diabetes Father       SOCIAL HISTORY:   Social History     Socioeconomic History    Marital status: Single   Tobacco Use    Smoking status: Never     Passive exposure: Yes    Smokeless tobacco: Never   Substance and Sexual Activity    Alcohol use: No    Drug use: Never    Sexual activity: Yes     Partners: Male       MEDICATIONS:   Current Outpatient Medications:     biotin 1 mg tablet, Take 1,000 mcg by mouth twice a week. (Patient not taking: Reported on 10/17/2024), Disp: , Rfl:     ergocalciferol (ERGOCALCIFEROL) 50,000 unit Cap, TAKE ONE CAPSULE BY MOUTH EVERY 7 DAYS (Patient not taking: Reported on 10/17/2024), Disp: 12 capsule, Rfl: 3    ergocalciferol (ERGOCALCIFEROL) 50,000 unit Cap, Take 1 capsule (50,000 Units total) by mouth every 7 days. (Patient not taking: Reported on 10/17/2024), Disp: 12 capsule, Rfl: 3    Lactobacillus rhamnosus GG (CULTURELLE) 10 billion cell capsule, Take 1 capsule by mouth once daily. (Patient not taking: Reported on 10/17/2024), Disp: , Rfl:     mupirocin (BACTROBAN) 2 % ointment, Apply topically 3 (three) times daily. (Patient not taking: Reported on 10/17/2024), Disp: 15 g, Rfl: 0    omega-3/dha/epa/ala/vitamin D3 (OMEGA-3-DHA-EPA-ALA-VIT D3 ORAL), Take by mouth., Disp: , Rfl:     polyethylene glycol (GLYCOLAX) 17 gram/dose powder, Take 17 g by mouth once daily. (Patient not taking: Reported on 10/17/2024), Disp: 510 g, Rfl: 11    triamcinolone acetonide 0.1% (KENALOG) 0.1 % ointment, Apply topically 2 (two) times daily. To affected area (Patient not taking: Reported on 10/17/2024), Disp: 80 g, Rfl: 0  ALLERGIES:   Review of patient's allergies indicates:   Allergen Reactions    Ibuprofen Hives       Review of Systems   Constitution: Negative. Negative for chills, fever and night sweats.   HENT: Negative for  congestion and headaches.    Eyes: Negative for blurred vision, left vision loss and right vision loss.   Cardiovascular: Negative for chest pain and syncope.   Respiratory: Negative for cough and shortness of breath.    Endocrine: Negative for polydipsia, polyphagia and polyuria.   Hematologic/Lymphatic: Negative for bleeding problem. Does not bruise/bleed easily.   Skin: Negative for dry skin, itching and rash.   Musculoskeletal: Negative for falls and muscle weakness.   Gastrointestinal: Negative for abdominal pain and bowel incontinence.   Genitourinary: Negative for bladder incontinence and nocturia.   Neurological: Negative for disturbances in coordination, loss of balance and seizures.   Psychiatric/Behavioral: Negative for depression. The patient does not have insomnia.    Allergic/Immunologic: Negative for hives and persistent infections.     PHYSICAL EXAM:  GEN: A&Ox3, WD WN NAD  HEENT: WNL  CHEST: CTAB, no W/R/R  HEART: RRR, no M/R/G   ABD: Soft, NT ND, BS x4 QUADS  MS: Refer to previous note for detailed MS exam  NEURO: CN II-XII intact       The surgical consent was then reviewed with the patient, who agreed with all the contents of the consent form and it was signed. she was instructed to wait for a phone call from the anesthesia department prior to surgery to discuss past medical history, medications, and clearance. Also, informed she may be required to get additional testing per the anesthesia department prior to having surgery.     PHYSICAL THERAPY:  She was also instructed regarding physical therapy and will begin POD # 1-3. She is doing physical therapy at [facility]. [place]    POST OP CARE:instructions were reviewed including care of the wound and dressing after surgery and when she can shower.     PAIN MANAGEMENT: Felicity Cohen was also given a pain management regime, which includes the TENS unit given to her by  Pilar Augustine  along with the education  required for its use. She was also instructed regarding the Polar ice unit that will be in place after surgery and her postoperative pain medications.     PAIN MEDICATION:  Roxicodone (Oxycodone) 5 mg po q 4-6 hours prn pain   Phenergan 25 mg one p.o. q.4-6 hours prn nausea and vomiting.  Celebrex 200 mg BID with meals  Robaxin 500mg q 8 hours prn pain  Aspirin 81mg daily x 6 weeks for DVT prophylaxis starting on the evening after surgery.    Post op meds to be delivered bedside prior to discharge. Deliver to family if patient is in surgery at 5pm.   Patient denies history of seizures.     Patient will also use bilateral TEDs on lower extremities, SCDs during surgery, and early ambulation post-op. If the patient was previously taking 81mg baby aspirin, they were told to not take it will using the above stated aspirin and to restart the 81mg aspirin after completion of the aspirin dose.       Patient was also told to buy over the counter Prilosec medication and take it once daily for GI protection as long as they are taking NSAIDs or Aspirin.    DVT prophylaxis was discussed with the patient today including risk factors for developing DVTs and history of DVTs. The patient was asked if any specific recommendations were given from the doctor/s that did pre-operative surgical clearance.      Patient was asked if they were taking or using OCP pills or devices. If they answered yes, then they were instructed to stop using OCPs at this pre-operative appointment until 2 months post-op to help prevent DVT development. They understand that there are other forms of birth control that do not involve hormones. They expressed understanding that ignoring/not following this instruction could result in a DVT which could turn into a deadly pulmonary embolism.      The patient was told that narcotic pain medications may make them drowsy and instructions were given to not sign legal documents, drive or operate heavy machinery, cars, or  equipment while under the influence of narcotic medications.     My supervising physician Jhony Berry MD was also present during the appointment.  He discussed with the patient all the potential complications, risks, and benefits of their upcoming surgery.    As there were no other questions to be asked, she was given my business card along with Jhony Berry's, MD business card if she has any questions or concerns prior to surgery or in the postop period.

## 2024-12-03 NOTE — H&P
Felicity Cohen  is here for a completion of her perioperative paperwork. she  Is scheduled to undergo:    LEFT Knee  Arthroscopy   Anterior cruciate ligament reconstruction with quadriceps tendon autograft   Possible medial/lateral meniscus repair versus partial meniscectomy   Posterolateral corner reconstruction with allograft   Lateral extra-articular tenodesis   All other indicated procedures     on 12/12/2024.  She is a healthy individual and does not need clearance for this procedure.     Risks, indications and benefits of the surgical procedure were discussed with the patient. All questions with regard to surgery, rehab, expected return to functional activities, activities of daily living and recreational endeavors were answered to her satisfaction.    Discussed COVID-19 with the patient, they are aware of our current policies and procedures, were given the option of delaying surgery, and they elect to proceed.    Patient was informed and understands the risks of surgery are greater for patients with a current condition or history of heart disease, obesity, clotting disorders, recurrent infections, steroid use, current or past smoking, and factors such as sedentary lifestyle and noncompliance with medications, therapy or follow-up. The degree of the increased risk is hard to estimate with any degree of precision.    Once no other questions were asked, a brief history and physical exam was then performed.    PAST MEDICAL HISTORY:   Past Medical History:   Diagnosis Date    ADHD (attention deficit hyperactivity disorder)     Anxiety     Constipation     Depression     GERD (gastroesophageal reflux disease)     IBS (irritable bowel syndrome)     Ovarian cyst     Schizophrenia in children      PAST SURGICAL HISTORY:   Past Surgical History:   Procedure Laterality Date    ESOPHAGOGASTRODUODENOSCOPY N/A 2/8/2022    Procedure: EGD (ESOPHAGOGASTRODUODENOSCOPY);  Surgeon: Berkley Mckenna MD;  Location: [facility]  ENDO;  Service: Endoscopy;  Laterality: N/A;    WISDOM TOOTH EXTRACTION       FAMILY HISTORY:   Family History   Problem Relation Name Age of Onset    Asthma Mother      Diabetes Father       SOCIAL HISTORY:   Social History     Socioeconomic History    Marital status: Single   Tobacco Use    Smoking status: Never     Passive exposure: Yes    Smokeless tobacco: Never   Substance and Sexual Activity    Alcohol use: No    Drug use: Never    Sexual activity: Yes     Partners: Male       MEDICATIONS:   Current Outpatient Medications:     biotin 1 mg tablet, Take 1,000 mcg by mouth twice a week. (Patient not taking: Reported on 10/17/2024), Disp: , Rfl:     ergocalciferol (ERGOCALCIFEROL) 50,000 unit Cap, TAKE ONE CAPSULE BY MOUTH EVERY 7 DAYS (Patient not taking: Reported on 10/17/2024), Disp: 12 capsule, Rfl: 3    ergocalciferol (ERGOCALCIFEROL) 50,000 unit Cap, Take 1 capsule (50,000 Units total) by mouth every 7 days. (Patient not taking: Reported on 10/17/2024), Disp: 12 capsule, Rfl: 3    Lactobacillus rhamnosus GG (CULTURELLE) 10 billion cell capsule, Take 1 capsule by mouth once daily. (Patient not taking: Reported on 10/17/2024), Disp: , Rfl:     mupirocin (BACTROBAN) 2 % ointment, Apply topically 3 (three) times daily. (Patient not taking: Reported on 10/17/2024), Disp: 15 g, Rfl: 0    omega-3/dha/epa/ala/vitamin D3 (OMEGA-3-DHA-EPA-ALA-VIT D3 ORAL), Take by mouth., Disp: , Rfl:     polyethylene glycol (GLYCOLAX) 17 gram/dose powder, Take 17 g by mouth once daily. (Patient not taking: Reported on 10/17/2024), Disp: 510 g, Rfl: 11    triamcinolone acetonide 0.1% (KENALOG) 0.1 % ointment, Apply topically 2 (two) times daily. To affected area (Patient not taking: Reported on 10/17/2024), Disp: 80 g, Rfl: 0  ALLERGIES:   Review of patient's allergies indicates:   Allergen Reactions    Ibuprofen Hives       Review of Systems   Constitution: Negative. Negative for chills, fever and night sweats.   HENT: Negative for  congestion and headaches.    Eyes: Negative for blurred vision, left vision loss and right vision loss.   Cardiovascular: Negative for chest pain and syncope.   Respiratory: Negative for cough and shortness of breath.    Endocrine: Negative for polydipsia, polyphagia and polyuria.   Hematologic/Lymphatic: Negative for bleeding problem. Does not bruise/bleed easily.   Skin: Negative for dry skin, itching and rash.   Musculoskeletal: Negative for falls and muscle weakness.   Gastrointestinal: Negative for abdominal pain and bowel incontinence.   Genitourinary: Negative for bladder incontinence and nocturia.   Neurological: Negative for disturbances in coordination, loss of balance and seizures.   Psychiatric/Behavioral: Negative for depression. The patient does not have insomnia.    Allergic/Immunologic: Negative for hives and persistent infections.     PHYSICAL EXAM:  GEN: A&Ox3, WD WN NAD  HEENT: WNL  CHEST: CTAB, no W/R/R  HEART: RRR, no M/R/G   ABD: Soft, NT ND, BS x4 QUADS  MS: Refer to previous note for detailed MS exam  NEURO: CN II-XII intact       The surgical consent was then reviewed with the patient, who agreed with all the contents of the consent form and it was signed. she was instructed to wait for a phone call from the anesthesia department prior to surgery to discuss past medical history, medications, and clearance. Also, informed she may be required to get additional testing per the anesthesia department prior to having surgery.     PHYSICAL THERAPY:  She was also instructed regarding physical therapy and will begin POD # 1-3. She is doing physical therapy at [facility]. [place]    POST OP CARE:instructions were reviewed including care of the wound and dressing after surgery and when she can shower.     PAIN MANAGEMENT: Felicity Cohen was also given a pain management regime, which includes the TENS unit given to her by  Pilar Augustine  along with the education  required for its use. She was also instructed regarding the Polar ice unit that will be in place after surgery and her postoperative pain medications.     PAIN MEDICATION:  Roxicodone (Oxycodone) 5 mg po q 4-6 hours prn pain   Phenergan 25 mg one p.o. q.4-6 hours prn nausea and vomiting.  Celebrex 200 mg BID with meals  Robaxin 500mg q 8 hours prn pain  Aspirin 81mg daily x 6 weeks for DVT prophylaxis starting on the evening after surgery.    Post op meds to be delivered bedside prior to discharge. Deliver to family if patient is in surgery at 5pm.   Patient denies history of seizures.     Patient will also use bilateral TEDs on lower extremities, SCDs during surgery, and early ambulation post-op. If the patient was previously taking 81mg baby aspirin, they were told to not take it will using the above stated aspirin and to restart the 81mg aspirin after completion of the aspirin dose.       Patient was also told to buy over the counter Prilosec medication and take it once daily for GI protection as long as they are taking NSAIDs or Aspirin.    DVT prophylaxis was discussed with the patient today including risk factors for developing DVTs and history of DVTs. The patient was asked if any specific recommendations were given from the doctor/s that did pre-operative surgical clearance.      Patient was asked if they were taking or using OCP pills or devices. If they answered yes, then they were instructed to stop using OCPs at this pre-operative appointment until 2 months post-op to help prevent DVT development. They understand that there are other forms of birth control that do not involve hormones. They expressed understanding that ignoring/not following this instruction could result in a DVT which could turn into a deadly pulmonary embolism.      The patient was told that narcotic pain medications may make them drowsy and instructions were given to not sign legal documents, drive or operate heavy machinery, cars, or  equipment while under the influence of narcotic medications.     My supervising physician Jhony Berry MD was also present during the appointment.  He discussed with the patient all the potential complications, risks, and benefits of their upcoming surgery.    As there were no other questions to be asked, she was given my business card along with Jhony Berry's, MD business card if she has any questions or concerns prior to surgery or in the postop period.

## 2024-12-10 ENCOUNTER — PATIENT MESSAGE (OUTPATIENT)
Dept: SPORTS MEDICINE | Facility: CLINIC | Age: 24
End: 2024-12-10
Payer: MEDICAID

## 2024-12-10 ENCOUNTER — CLINICAL SUPPORT (OUTPATIENT)
Dept: REHABILITATION | Facility: HOSPITAL | Age: 24
End: 2024-12-10
Payer: MEDICAID

## 2024-12-10 DIAGNOSIS — M25.562 ACUTE PAIN OF LEFT KNEE: Primary | ICD-10-CM

## 2024-12-10 PROCEDURE — 97110 THERAPEUTIC EXERCISES: CPT | Mod: CQ

## 2024-12-10 RX ORDER — CETIRIZINE HYDROCHLORIDE 10 MG/1
10 TABLET ORAL DAILY
COMMUNITY
Start: 2024-06-12

## 2024-12-10 RX ORDER — BUTALBITAL, ACETAMINOPHEN AND CAFFEINE 300; 40; 50 MG/1; MG/1; MG/1
1 CAPSULE ORAL DAILY PRN
COMMUNITY
Start: 2024-11-04

## 2024-12-10 RX ORDER — ACETAMINOPHEN 325 MG/1
325 TABLET ORAL EVERY 6 HOURS PRN
COMMUNITY

## 2024-12-10 NOTE — PRE-PROCEDURE INSTRUCTIONS
PreOp Instructions given:   - Verbal medication information (what to hold and what to take)   - NPO guidelines given/Ortho Sx Dept  - Arrival place directions given; time to be given the day before procedure by the   Surgeon's Office DOSC  - Bathing with antibacterial soap   - Don't wear any jewelry or bring any valuables AM of surgery   - No makeup or moisturizer to face   - No perfume/cologne, powder, lotions or aftershave   Pt. verbalized understanding.   Pt denies any h/o Anesthesia/Sedation complications or side effects.  Patient does not know arrival time.  Explained that this information comes from the surgeon's office and if they haven't heard from them by 2 or 3 pm to call the office.  Patient stated an understanding.

## 2024-12-10 NOTE — PROGRESS NOTES
"                          Physical Therapy Daily Treatment Note     Name: Felicity Cohen  Clinic Number: [id number]    Therapy Diagnosis:   Encounter Diagnosis   Name Primary?    Acute pain of left knee Yes     Physician: Jhony Berry MD    Visit Date: 12/10/2024    Physician Orders: PT Eval and Treat   Medical Diagnosis from Referral:   S83.512A (ICD-10-CM) - Rupture of anterior cruciate ligament of left knee, initial encounter   S83.422A (ICD-10-CM) - Tear of lateral collateral ligament of left knee, initial encounter   S83.8X2D (ICD-10-CM) - Acute medial meniscal injury of left knee, subsequent encounter      Evaluation Date: 10/24/2024  Authorization Period Expiration: 12/31/24  Plan of Care Expiration: 12/12/24  Visit # / Visits authorized: 7/ 12   FOTO: 1/3     Time In: 0810 a  Time Out: 0900  Total Billable Time: 50 minutes      Precautions: Standard  Subjective      Pt reports knee was hurting a little yesterday. Felt better after bending it   she was compliant with home exercise program given last session.   Response to previous treatment:NA  Functional change: NA    Pain: not quantified this visit/10  Location: left knee      Objective   Range of Motion: (PROM): 12/10/24     Knee Left Right   Extension  (+5 hyper) degrees   (+10 hyper) degrees   Flexion  (100) degrees   (110) degrees      Felicity received therapeutic exercises to develop strength, endurance, ROM, flexibility, and core stabilization for 50 minutes including:    Knee prop with 4# x5 min  Heel slides with QS  x5 min  QS into hyperext with strap x 30    SLR, SL hip abd  3x10 ea 2#  Prone hip ext 3x10  LAQ with ball squeeze 3x10 to tempo 2#  DL shuttle 112.5# 4x10      Np:  Recumbent bike x6 min (in available ROM)-  Quad sets with NMES x10 min (10 sec on/10 sec off)-np  Clams rtb B 2x15 with 5 sec hold-np  Bridges rtb 2x10-np  STS 24" 2x10-np  Prone quad stretch 10 sec x10-np  FOTO-np  Gait training-np  Pt education and HEP " review    *billed per medicaid guidelines      Home Exercises Provided and Patient Education Provided     Education provided:   - HEP to Go    Written Home Exercises Provided: Patient instructed to cont prior HEP.  Exercises were reviewed and Felicity was able to demonstrate them prior to the end of the session.  Felicity demonstrated good  understanding of the education provided.     See EMR under Patient Instructions for exercises provided     Assessment     Felicity has maintained her ROM but still not equal to contralateral side. Not slag with SLR showing good quad strength going into her surgery. Surgery on Thursday 12/12/24 and will have her follow up eval day 1 post op on Friday 12/13 with Deborah Coe DPT.  Felicity Is progressing well towards her goals.   Pt prognosis is Good.     Pt will continue to benefit from skilled outpatient physical therapy to address the deficits listed in the problem list box on initial evaluation, provide pt/family education and to maximize pt's level of independence in the home and community environment.     Pt's spiritual, cultural and educational needs considered and pt agreeable to plan of care and goals.    Anticipated barriers to physical therapy: none    Goals:   Short Term Goals: 4 weeks (Progressing, not met)  - Pt will increase ROM to +5 L knee hyperextension, 100 deg flex- met  - Pt will increase strength to 4+/5 L knee flex and ext painfree  - Decrease Pain to 3/10 as worst with all PT interventions  - Pt to self correct posture and gait with no AD and minimal cues  - Pt independent with HEP with progressions.      Long Term Goals (8 Weeks): (Progressing, not met)  - Pt will increase ROM to 0 deg L knee hyperextension and 110 deg flexion  - Pt will increase strength to 5/5 L knee in all planes  - Decrease Pain to 2/10 as worst with stairs and walking  - Pt to return to 70% PLOF    Plan     Continue POC per pt care.     Edwina Kelly, PTA,

## 2024-12-11 ENCOUNTER — ANESTHESIA EVENT (OUTPATIENT)
Dept: SURGERY | Facility: HOSPITAL | Age: 24
End: 2024-12-11
Payer: MEDICAID

## 2024-12-11 NOTE — ANESTHESIA PREPROCEDURE EVALUATION
[facility]  Anesthesia Pre-Operative Evaluation         Patient Name: Felicity Cohen  YOB: 2000  MRN: [id number]    SUBJECTIVE:     Pre-operative evaluation for Procedure(s) (LRB):  RECONSTRUCTION, KNEE, ACL, QUAD TENDON AUTOGRAFT (Left)  RECONSTRUCTION, POSTEROLATERAL CORNER (PLC) WITH ALLOGRAFT (Left)  RECONSTRUCTION, LIGAMENT, KNEE, EXTRA-ARTICULAR (Left)  ARTHROSCOPY,KNEE,WITH MENISCUS REPAIR (Left)  SYNOVECTOMY, KNEE, LIMITED, ARTHROSCOPIC (Left)     12/11/2024    Felicity Cohen is a 24 y.o. female w/ a significant PMHx of GERD and obesity (BMI 59).    Patient with left ACL rupture.    Patient now presents for the above procedure(s).      LDA:      Prev airway: None documented.    Drips: None documented.    Patient Active Problem List   Diagnosis    Epigastric pain    Gastroesophageal reflux disease    Chronic idiopathic constipation    Class 3 severe obesity due to excess calories with body mass index (BMI) of 50.0 to 59.9 in adult    Acute cystitis with hematuria    Left anterior cruciate ligament tear    Tear of lateral collateral ligament of left knee    Acute medial meniscal injury of left knee    Acute pain of left knee       Review of patient's allergies indicates:   Allergen Reactions    Ibuprofen Hives       Current Outpatient Medications:  No current facility-administered medications for this encounter.    Current Outpatient Medications:     butalbital-acetaminophen-caff -40 mg Cap, Take 1 capsule by mouth daily as needed., Disp: , Rfl:     cetirizine (ZYRTEC) 10 MG tablet, Take 10 mg by mouth once daily., Disp: , Rfl:     acetaminophen (TYLENOL) 325 MG tablet, Take 325 mg by mouth every 6 (six) hours as needed., Disp: , Rfl:     aspirin (ECOTRIN) 81 MG EC tablet, Take 1 tablet (81 mg total) by mouth once daily., Disp: 42 tablet, Rfl: 0    biotin 1 mg tablet, Take 1,000 mcg by mouth twice a week. (Patient not taking: Reported on 7/1/2024),  Disp: , Rfl:     celecoxib (CELEBREX) 200 MG capsule, Take 1 capsule (200 mg total) by mouth 2 (two) times daily with meals., Disp: 60 capsule, Rfl: 0    ergocalciferol (ERGOCALCIFEROL) 50,000 unit Cap, TAKE ONE CAPSULE BY MOUTH EVERY 7 DAYS (Patient not taking: Reported on 9/26/2024), Disp: 12 capsule, Rfl: 3    ergocalciferol (ERGOCALCIFEROL) 50,000 unit Cap, Take 1 capsule (50,000 Units total) by mouth every 7 days. (Patient not taking: Reported on 9/26/2024), Disp: 12 capsule, Rfl: 3    Lactobacillus rhamnosus GG (CULTURELLE) 10 billion cell capsule, Take 1 capsule by mouth once daily. (Patient not taking: Reported on 7/1/2024), Disp: , Rfl:     methocarbamoL (ROBAXIN) 500 MG Tab, Take 1 tablet (500 mg total) by mouth 3 (three) times daily as needed (muscle spasms)., Disp: 30 tablet, Rfl: 0    mupirocin (BACTROBAN) 2 % ointment, Apply topically 3 (three) times daily. (Patient not taking: Reported on 9/26/2024), Disp: 15 g, Rfl: 0    omega-3/dha/epa/ala/vitamin D3 (OMEGA-3-DHA-EPA-ALA-VIT D3 ORAL), Take by mouth., Disp: , Rfl:     oxyCODONE (ROXICODONE) 5 MG immediate release tablet, Take 1 tablet (5 mg total) by mouth every 6 (six) hours as needed for Pain., Disp: 28 tablet, Rfl: 0    polyethylene glycol (GLYCOLAX) 17 gram/dose powder, Take 17 g by mouth once daily. (Patient not taking: Reported on 9/26/2024), Disp: 510 g, Rfl: 11    promethazine (PHENERGAN) 25 MG tablet, Take 1 tablet (25 mg total) by mouth every 6 (six) hours as needed for Nausea., Disp: 30 tablet, Rfl: 0    triamcinolone acetonide 0.1% (KENALOG) 0.1 % ointment, Apply topically 2 (two) times daily. To affected area (Patient not taking: Reported on 7/1/2024), Disp: 80 g, Rfl: 0    Past Surgical History:   Procedure Laterality Date    ESOPHAGOGASTRODUODENOSCOPY N/A 2/8/2022    Procedure: EGD (ESOPHAGOGASTRODUODENOSCOPY);  Surgeon: Berkley Mckenna MD;  Location: [facility];  Service: Endoscopy;  Laterality: N/A;    WISDOM TOOTH  EXTRACTION         Social History     Socioeconomic History    Marital status: Single   Tobacco Use    Smoking status: Never     Passive exposure: Yes    Smokeless tobacco: Never   Substance and Sexual Activity    Alcohol use: No    Drug use: Never    Sexual activity: Yes     Partners: Male       OBJECTIVE:     Vital Signs Range (Last 24H):         Significant Labs:  Lab Results   Component Value Date    WBC 8.6 01/25/2022    HGB 12.2 01/25/2022    HCT 39.3 01/25/2022     01/25/2022    CHOL 174 03/23/2021    TRIG 69 03/23/2021    HDL 46 03/23/2021    ALT 18 01/25/2022    AST 17 01/25/2022     01/25/2022    K 4.5 01/25/2022     01/25/2022    CREATININE 0.62 01/25/2022    BUN 8 01/25/2022    CO2 20 01/25/2022    TSH 2.510 01/25/2022    HGBA1C 5.1 01/25/2022       Diagnostic Studies: No relevant studies.    EKG:   Results for orders placed or performed during the hospital encounter of 04/07/19   EKG 12-lead    Collection Time: 04/07/19 12:26 PM    Narrative    Test Reason : R07.9,    Vent. Rate : 086 BPM     Atrial Rate : 086 BPM     P-R Int : 156 ms          QRS Dur : 074 ms      QT Int : 352 ms       P-R-T Axes : 041 030 031 degrees     QTc Int : 421 ms    Normal sinus rhythm  Normal ECG  No previous ECGs available  Confirmed by Corey Salazar MD ([id number]) on 4/8/2019 5:51:23 PM    Referred By: [email or web address]   SELF           Confirmed By:Corey Salazar MD       2D ECHO:  TTE:  No results found for this or any previous visit.    MARIAN:  No results found for this or any previous visit.    ASSESSMENT/PLAN:           Pre-op Assessment    I have reviewed the Patient Summary Reports.     I have reviewed the Nursing Notes. I have reviewed the NPO Status.   I have reviewed the Medications.     Review of Systems  Cardiovascular:  Cardiovascular Normal                                              Pulmonary:  Pulmonary Normal                       Renal/:  Renal/ Normal                 Hepatic/GI:     GERD                 Neurological:  Neurology Normal                                      Endocrine:        Obesity / BMI > 30      Physical Exam  General: Well nourished, Cooperative, Alert and Oriented    Airway:  Mallampati: III   Mouth Opening: Normal  TM Distance: Normal  Tongue: Normal  Neck ROM: Normal ROM      Anesthesia Plan  Type of Anesthesia, risks & benefits discussed:    Anesthesia Type: MAC, Regional, Spinal  Intra-op Monitoring Plan: Standard ASA Monitors  Post Op Pain Control Plan: multimodal analgesia and IV/PO Opioids PRN  Induction:  IV  Airway Plan: Direct, Post-Induction  Informed Consent: Informed consent signed with the Patient and all parties understand the risks and agree with anesthesia plan.  All questions answered.   ASA Score: 3  Day of Surgery Review of History & Physical: H&P Update referred to the surgeon/provider.    Ready For Surgery From Anesthesia Perspective.     .

## 2024-12-12 ENCOUNTER — ANESTHESIA (OUTPATIENT)
Dept: SURGERY | Facility: HOSPITAL | Age: 24
End: 2024-12-12
Payer: MEDICAID

## 2024-12-12 ENCOUNTER — HOSPITAL ENCOUNTER (OUTPATIENT)
Facility: HOSPITAL | Age: 24
Discharge: HOME | End: 2024-12-12
Attending: STUDENT IN AN ORGANIZED HEALTH CARE EDUCATION/TRAINING PROGRAM | Admitting: STUDENT IN AN ORGANIZED HEALTH CARE EDUCATION/TRAINING PROGRAM
Payer: MEDICAID

## 2024-12-12 VITALS
SYSTOLIC BLOOD PRESSURE: 148 MMHG | DIASTOLIC BLOOD PRESSURE: 88 MMHG | HEART RATE: 106 BPM | TEMPERATURE: 99 F | OXYGEN SATURATION: 97 % | WEIGHT: 293 LBS | BODY MASS INDEX: 48.82 KG/M2 | HEIGHT: 65 IN | RESPIRATION RATE: 15 BRPM

## 2024-12-12 DIAGNOSIS — S83.512A RUPTURE OF ANTERIOR CRUCIATE LIGAMENT OF LEFT KNEE, INITIAL ENCOUNTER: ICD-10-CM

## 2024-12-12 PROBLEM — S89.92XA INJURY OF POSTEROLATERAL CORNER OF LEFT KNEE: Status: ACTIVE | Noted: 2024-12-12

## 2024-12-12 LAB
B-HCG UR QL: NEGATIVE
CTP QC/QA: YES

## 2024-12-12 PROCEDURE — 25000003 PHARM REV CODE 250: Performed by: ORTHOPAEDIC SURGERY

## 2024-12-12 PROCEDURE — 29888 ARTHRS AID ACL RPR/AGMNTJ: CPT | Mod: 59,LT,, | Performed by: STUDENT IN AN ORGANIZED HEALTH CARE EDUCATION/TRAINING PROGRAM

## 2024-12-12 PROCEDURE — 71000044 HC DOSC ROUTINE RECOVERY FIRST HOUR: Performed by: STUDENT IN AN ORGANIZED HEALTH CARE EDUCATION/TRAINING PROGRAM

## 2024-12-12 PROCEDURE — 63600175 PHARM REV CODE 636 W HCPCS

## 2024-12-12 PROCEDURE — 64708 REVISE ARM/LEG NERVE: CPT | Mod: 51,LT,, | Performed by: STUDENT IN AN ORGANIZED HEALTH CARE EDUCATION/TRAINING PROGRAM

## 2024-12-12 PROCEDURE — C1713 ANCHOR/SCREW BN/BN,TIS/BN: HCPCS | Performed by: STUDENT IN AN ORGANIZED HEALTH CARE EDUCATION/TRAINING PROGRAM

## 2024-12-12 PROCEDURE — 71000015 HC POSTOP RECOV 1ST HR: Performed by: STUDENT IN AN ORGANIZED HEALTH CARE EDUCATION/TRAINING PROGRAM

## 2024-12-12 PROCEDURE — 29883 ARTHRS KNE SRG MNISC RPR M&L: CPT | Mod: 51,LT,, | Performed by: STUDENT IN AN ORGANIZED HEALTH CARE EDUCATION/TRAINING PROGRAM

## 2024-12-12 PROCEDURE — C1762 CONN TISS, HUMAN(INC FASCIA): HCPCS | Performed by: STUDENT IN AN ORGANIZED HEALTH CARE EDUCATION/TRAINING PROGRAM

## 2024-12-12 PROCEDURE — 25000003 PHARM REV CODE 250: Performed by: NURSE ANESTHETIST, CERTIFIED REGISTERED

## 2024-12-12 PROCEDURE — 25000003 PHARM REV CODE 250: Performed by: ANESTHESIOLOGY

## 2024-12-12 PROCEDURE — 27201423 OPTIME MED/SURG SUP & DEVICES STERILE SUPPLY: Performed by: STUDENT IN AN ORGANIZED HEALTH CARE EDUCATION/TRAINING PROGRAM

## 2024-12-12 PROCEDURE — 37000008 HC ANESTHESIA 1ST 15 MINUTES: Performed by: STUDENT IN AN ORGANIZED HEALTH CARE EDUCATION/TRAINING PROGRAM

## 2024-12-12 PROCEDURE — 64708 REVISE ARM/LEG NERVE: CPT | Mod: 80,LT,, | Performed by: ORTHOPAEDIC SURGERY

## 2024-12-12 PROCEDURE — 63600175 PHARM REV CODE 636 W HCPCS: Performed by: STUDENT IN AN ORGANIZED HEALTH CARE EDUCATION/TRAINING PROGRAM

## 2024-12-12 PROCEDURE — 36000711: Performed by: STUDENT IN AN ORGANIZED HEALTH CARE EDUCATION/TRAINING PROGRAM

## 2024-12-12 PROCEDURE — 27427 RECONSTRUCTION KNEE: CPT | Mod: 51,LT,, | Performed by: STUDENT IN AN ORGANIZED HEALTH CARE EDUCATION/TRAINING PROGRAM

## 2024-12-12 PROCEDURE — 25000003 PHARM REV CODE 250

## 2024-12-12 PROCEDURE — 63600175 PHARM REV CODE 636 W HCPCS: Performed by: ANESTHESIOLOGY

## 2024-12-12 PROCEDURE — 63600175 PHARM REV CODE 636 W HCPCS: Performed by: ORTHOPAEDIC SURGERY

## 2024-12-12 PROCEDURE — 81025 URINE PREGNANCY TEST: CPT | Performed by: ORTHOPAEDIC SURGERY

## 2024-12-12 PROCEDURE — 25000003 PHARM REV CODE 250: Performed by: STUDENT IN AN ORGANIZED HEALTH CARE EDUCATION/TRAINING PROGRAM

## 2024-12-12 PROCEDURE — C1889 IMPLANT/INSERT DEVICE, NOC: HCPCS | Performed by: STUDENT IN AN ORGANIZED HEALTH CARE EDUCATION/TRAINING PROGRAM

## 2024-12-12 PROCEDURE — 27800903 OPTIME MED/SURG SUP & DEVICES OTHER IMPLANTS: Performed by: STUDENT IN AN ORGANIZED HEALTH CARE EDUCATION/TRAINING PROGRAM

## 2024-12-12 PROCEDURE — 37000009 HC ANESTHESIA EA ADD 15 MINS: Performed by: STUDENT IN AN ORGANIZED HEALTH CARE EDUCATION/TRAINING PROGRAM

## 2024-12-12 PROCEDURE — 76942 ECHO GUIDE FOR BIOPSY: CPT

## 2024-12-12 PROCEDURE — 64448 NJX AA&/STRD FEM NRV NFS IMG: CPT

## 2024-12-12 PROCEDURE — 27405 REPAIR OF KNEE LIGAMENT: CPT | Mod: 51,LT,, | Performed by: STUDENT IN AN ORGANIZED HEALTH CARE EDUCATION/TRAINING PROGRAM

## 2024-12-12 PROCEDURE — 36000710: Performed by: STUDENT IN AN ORGANIZED HEALTH CARE EDUCATION/TRAINING PROGRAM

## 2024-12-12 DEVICE — DEVICE FIX TIGHTROPE FIBERTAG: Type: IMPLANTABLE DEVICE | Site: KNEE | Status: FUNCTIONAL

## 2024-12-12 DEVICE — HYBRID FIBERTAK WITH NEEDLES, KNEE
Type: IMPLANTABLE DEVICE | Site: KNEE | Status: FUNCTIONAL
Brand: ARTHREX®

## 2024-12-12 DEVICE — SWVLK PEEK TENO 7MM W/FORK EYELET
Type: IMPLANTABLE DEVICE | Site: KNEE | Status: FUNCTIONAL
Brand: ARTHREX®

## 2024-12-12 DEVICE — TIGHTROPE ® II BTB WITH DEPLOYING SUTURE
Type: IMPLANTABLE DEVICE | Site: KNEE | Status: FUNCTIONAL
Brand: ARTHREX®

## 2024-12-12 DEVICE — CHIPS CANCELLOUS 30CC: Type: IMPLANTABLE DEVICE | Site: KNEE | Status: FUNCTIONAL

## 2024-12-12 DEVICE — IMPLANTABLE DEVICE
Type: IMPLANTABLE DEVICE | Site: KNEE | Status: FUNCTIONAL
Brand: FIBERSTITCH™ IMPLANT 1.5, 24° CURVED WITH TWO POLYESTER IMPLANTS AND 2-0 FIBERWI

## 2024-12-12 DEVICE — IMPLSYS 2NDRY FIXATN BIOSWVLK 4.75X19.1
Type: IMPLANTABLE DEVICE | Site: KNEE | Status: FUNCTIONAL
Brand: ARTHREX®

## 2024-12-12 DEVICE — TENDON POSTERIOR TIBIALIS: Type: IMPLANTABLE DEVICE | Site: KNEE | Status: FUNCTIONAL

## 2024-12-12 DEVICE — TIGHTROPE BUTTON EXTENDER
Type: IMPLANTABLE DEVICE | Site: KNEE | Status: FUNCTIONAL
Brand: ARTHREX®

## 2024-12-12 RX ORDER — SODIUM CHLORIDE 9 MG/ML
INJECTION, SOLUTION INTRAVENOUS CONTINUOUS
Status: DISCONTINUED | OUTPATIENT
Start: 2024-12-12 | End: 2024-12-12 | Stop reason: HOSPADM

## 2024-12-12 RX ORDER — PHENYLEPHRINE HYDROCHLORIDE 10 MG/ML
INJECTION INTRAVENOUS
Status: DISCONTINUED | OUTPATIENT
Start: 2024-12-12 | End: 2024-12-12

## 2024-12-12 RX ORDER — BUPIVACAINE HYDROCHLORIDE AND EPINEPHRINE 2.5; 5 MG/ML; UG/ML
INJECTION, SOLUTION EPIDURAL; INFILTRATION; INTRACAUDAL; PERINEURAL
Status: COMPLETED | OUTPATIENT
Start: 2024-12-12 | End: 2024-12-12

## 2024-12-12 RX ORDER — MAGNESIUM SULFATE HEPTAHYDRATE 500 MG/ML
INJECTION, SOLUTION INTRAMUSCULAR; INTRAVENOUS
Status: DISCONTINUED | OUTPATIENT
Start: 2024-12-12 | End: 2024-12-12

## 2024-12-12 RX ORDER — KETAMINE HCL IN 0.9 % NACL 50 MG/5 ML
SYRINGE (ML) INTRAVENOUS
Status: DISCONTINUED | OUTPATIENT
Start: 2024-12-12 | End: 2024-12-12

## 2024-12-12 RX ORDER — ROPIVACAINE HYDROCHLORIDE 5 MG/ML
INJECTION, SOLUTION EPIDURAL; INFILTRATION; PERINEURAL
Status: COMPLETED | OUTPATIENT
Start: 2024-12-12 | End: 2024-12-12

## 2024-12-12 RX ORDER — OXYCODONE HYDROCHLORIDE 5 MG/1
5 TABLET ORAL
Status: DISCONTINUED | OUTPATIENT
Start: 2024-12-12 | End: 2024-12-12 | Stop reason: HOSPADM

## 2024-12-12 RX ORDER — SODIUM CHLORIDE 0.9 % (FLUSH) 0.9 %
10 SYRINGE (ML) INJECTION
Status: DISCONTINUED | OUTPATIENT
Start: 2024-12-12 | End: 2024-12-12 | Stop reason: HOSPADM

## 2024-12-12 RX ORDER — MIDAZOLAM HYDROCHLORIDE 1 MG/ML
.5-4 INJECTION, SOLUTION INTRAMUSCULAR; INTRAVENOUS
Status: DISCONTINUED | OUTPATIENT
Start: 2024-12-12 | End: 2024-12-12 | Stop reason: HOSPADM

## 2024-12-12 RX ORDER — SUCCINYLCHOLINE CHLORIDE 20 MG/ML
INJECTION INTRAMUSCULAR; INTRAVENOUS
Status: DISCONTINUED | OUTPATIENT
Start: 2024-12-12 | End: 2024-12-12

## 2024-12-12 RX ORDER — ACETAMINOPHEN 10 MG/ML
INJECTION, SOLUTION INTRAVENOUS
Status: DISCONTINUED | OUTPATIENT
Start: 2024-12-12 | End: 2024-12-12

## 2024-12-12 RX ORDER — GLUCAGON 1 MG
1 KIT INJECTION
Status: DISCONTINUED | OUTPATIENT
Start: 2024-12-12 | End: 2024-12-12 | Stop reason: HOSPADM

## 2024-12-12 RX ORDER — OXYCODONE HYDROCHLORIDE 10 MG/1
10 TABLET ORAL EVERY 4 HOURS PRN
Status: DISCONTINUED | OUTPATIENT
Start: 2024-12-12 | End: 2024-12-12 | Stop reason: HOSPADM

## 2024-12-12 RX ORDER — VANCOMYCIN HYDROCHLORIDE 1 G/20ML
INJECTION, POWDER, LYOPHILIZED, FOR SOLUTION INTRAVENOUS
Status: DISCONTINUED | OUTPATIENT
Start: 2024-12-12 | End: 2024-12-12 | Stop reason: HOSPADM

## 2024-12-12 RX ORDER — ACETAMINOPHEN 325 MG/1
650 TABLET ORAL EVERY 4 HOURS PRN
Status: DISCONTINUED | OUTPATIENT
Start: 2024-12-12 | End: 2024-12-12 | Stop reason: HOSPADM

## 2024-12-12 RX ORDER — MEPERIDINE HYDROCHLORIDE 50 MG/ML
12.5 INJECTION INTRAMUSCULAR; INTRAVENOUS; SUBCUTANEOUS ONCE AS NEEDED
Status: DISCONTINUED | OUTPATIENT
Start: 2024-12-12 | End: 2024-12-12 | Stop reason: HOSPADM

## 2024-12-12 RX ORDER — DEXAMETHASONE SODIUM PHOSPHATE 4 MG/ML
INJECTION, SOLUTION INTRA-ARTICULAR; INTRALESIONAL; INTRAMUSCULAR; INTRAVENOUS; SOFT TISSUE
Status: DISCONTINUED | OUTPATIENT
Start: 2024-12-12 | End: 2024-12-12

## 2024-12-12 RX ORDER — HYDROCODONE BITARTRATE AND ACETAMINOPHEN 5; 325 MG/1; MG/1
1 TABLET ORAL EVERY 4 HOURS PRN
Status: DISCONTINUED | OUTPATIENT
Start: 2024-12-12 | End: 2024-12-12 | Stop reason: HOSPADM

## 2024-12-12 RX ORDER — FENTANYL CITRATE 50 UG/ML
25-200 INJECTION, SOLUTION INTRAMUSCULAR; INTRAVENOUS
Status: DISCONTINUED | OUTPATIENT
Start: 2024-12-12 | End: 2024-12-12 | Stop reason: HOSPADM

## 2024-12-12 RX ORDER — ONDANSETRON HYDROCHLORIDE 2 MG/ML
4 INJECTION, SOLUTION INTRAVENOUS EVERY 12 HOURS PRN
Status: DISCONTINUED | OUTPATIENT
Start: 2024-12-12 | End: 2024-12-12 | Stop reason: HOSPADM

## 2024-12-12 RX ORDER — ROCURONIUM BROMIDE 10 MG/ML
INJECTION, SOLUTION INTRAVENOUS
Status: DISCONTINUED | OUTPATIENT
Start: 2024-12-12 | End: 2024-12-12

## 2024-12-12 RX ORDER — ONDANSETRON HYDROCHLORIDE 2 MG/ML
4 INJECTION, SOLUTION INTRAVENOUS DAILY PRN
Status: DISCONTINUED | OUTPATIENT
Start: 2024-12-12 | End: 2024-12-12 | Stop reason: HOSPADM

## 2024-12-12 RX ORDER — EPINEPHRINE 1 MG/ML
INJECTION INTRAMUSCULAR; INTRAVENOUS; SUBCUTANEOUS
Status: DISCONTINUED | OUTPATIENT
Start: 2024-12-12 | End: 2024-12-12 | Stop reason: HOSPADM

## 2024-12-12 RX ORDER — METOCLOPRAMIDE HYDROCHLORIDE 5 MG/ML
5 INJECTION INTRAMUSCULAR; INTRAVENOUS EVERY 6 HOURS PRN
Status: DISCONTINUED | OUTPATIENT
Start: 2024-12-12 | End: 2024-12-12 | Stop reason: HOSPADM

## 2024-12-12 RX ORDER — LIDOCAINE HYDROCHLORIDE 20 MG/ML
INJECTION, SOLUTION EPIDURAL; INFILTRATION; INTRACAUDAL; PERINEURAL
Status: DISCONTINUED | OUTPATIENT
Start: 2024-12-12 | End: 2024-12-12

## 2024-12-12 RX ORDER — DEXMEDETOMIDINE HYDROCHLORIDE 100 UG/ML
INJECTION, SOLUTION INTRAVENOUS
Status: DISCONTINUED | OUTPATIENT
Start: 2024-12-12 | End: 2024-12-12

## 2024-12-12 RX ORDER — PROPOFOL 10 MG/ML
VIAL (ML) INTRAVENOUS
Status: DISCONTINUED | OUTPATIENT
Start: 2024-12-12 | End: 2024-12-12

## 2024-12-12 RX ORDER — ESMOLOL HYDROCHLORIDE 10 MG/ML
INJECTION INTRAVENOUS
Status: DISCONTINUED | OUTPATIENT
Start: 2024-12-12 | End: 2024-12-12

## 2024-12-12 RX ORDER — HYDROMORPHONE HYDROCHLORIDE 1 MG/ML
0.2 INJECTION, SOLUTION INTRAMUSCULAR; INTRAVENOUS; SUBCUTANEOUS EVERY 5 MIN PRN
Status: DISCONTINUED | OUTPATIENT
Start: 2024-12-12 | End: 2024-12-12 | Stop reason: HOSPADM

## 2024-12-12 RX ORDER — ROPIVACAINE HYDROCHLORIDE 2 MG/ML
INJECTION, SOLUTION EPIDURAL; INFILTRATION; PERINEURAL CONTINUOUS
Status: DISCONTINUED | OUTPATIENT
Start: 2024-12-12 | End: 2024-12-12 | Stop reason: HOSPADM

## 2024-12-12 RX ORDER — BUPIVACAINE HYDROCHLORIDE 2.5 MG/ML
INJECTION, SOLUTION EPIDURAL; INFILTRATION; INTRACAUDAL
Status: DISCONTINUED | OUTPATIENT
Start: 2024-12-12 | End: 2024-12-12 | Stop reason: HOSPADM

## 2024-12-12 RX ORDER — PROCHLORPERAZINE EDISYLATE 5 MG/ML
5 INJECTION INTRAMUSCULAR; INTRAVENOUS EVERY 30 MIN PRN
Status: DISCONTINUED | OUTPATIENT
Start: 2024-12-12 | End: 2024-12-12 | Stop reason: HOSPADM

## 2024-12-12 RX ORDER — CEFAZOLIN 2 G/1
2 INJECTION, POWDER, FOR SOLUTION INTRAMUSCULAR; INTRAVENOUS
Status: DISCONTINUED | OUTPATIENT
Start: 2024-12-12 | End: 2024-12-12 | Stop reason: HOSPADM

## 2024-12-12 RX ADMIN — DEXMEDETOMIDINE 8 MCG: 100 INJECTION, SOLUTION, CONCENTRATE INTRAVENOUS at 03:12

## 2024-12-12 RX ADMIN — FENTANYL CITRATE 25 MCG: 50 INJECTION INTRAMUSCULAR; INTRAVENOUS at 04:12

## 2024-12-12 RX ADMIN — SUGAMMADEX 400 MG: 100 INJECTION, SOLUTION INTRAVENOUS at 04:12

## 2024-12-12 RX ADMIN — ROCURONIUM BROMIDE 20 MG: 10 INJECTION, SOLUTION INTRAVENOUS at 01:12

## 2024-12-12 RX ADMIN — Medication 25 MG: at 11:12

## 2024-12-12 RX ADMIN — MIDAZOLAM 1 MG: 1 INJECTION INTRAMUSCULAR; INTRAVENOUS at 09:12

## 2024-12-12 RX ADMIN — HYDROMORPHONE HYDROCHLORIDE 0.2 MG: 1 INJECTION, SOLUTION INTRAMUSCULAR; INTRAVENOUS; SUBCUTANEOUS at 05:12

## 2024-12-12 RX ADMIN — Medication: at 01:12

## 2024-12-12 RX ADMIN — PROPOFOL 50 MG: 10 INJECTION, EMULSION INTRAVENOUS at 04:12

## 2024-12-12 RX ADMIN — ROCURONIUM BROMIDE 20 MG: 10 INJECTION, SOLUTION INTRAVENOUS at 03:12

## 2024-12-12 RX ADMIN — ROCURONIUM BROMIDE 60 MG: 10 INJECTION, SOLUTION INTRAVENOUS at 11:12

## 2024-12-12 RX ADMIN — Medication 25 MG: at 03:12

## 2024-12-12 RX ADMIN — LIDOCAINE HYDROCHLORIDE 100 MG: 20 INJECTION, SOLUTION EPIDURAL; INFILTRATION; INTRACAUDAL at 11:12

## 2024-12-12 RX ADMIN — ACETAMINOPHEN 1000 MG: 10 INJECTION INTRAVENOUS at 12:12

## 2024-12-12 RX ADMIN — DEXMEDETOMIDINE 12 MCG: 100 INJECTION, SOLUTION, CONCENTRATE INTRAVENOUS at 02:12

## 2024-12-12 RX ADMIN — PROPOFOL 200 MG: 10 INJECTION, EMULSION INTRAVENOUS at 11:12

## 2024-12-12 RX ADMIN — CEFAZOLIN 3 G: 2 INJECTION, POWDER, FOR SOLUTION INTRAMUSCULAR; INTRAVENOUS at 03:12

## 2024-12-12 RX ADMIN — ESMOLOL HYDROCHLORIDE 10 MG: 100 INJECTION, SOLUTION INTRAVENOUS at 11:12

## 2024-12-12 RX ADMIN — DEXAMETHASONE SODIUM PHOSPHATE 8 MG: 4 INJECTION INTRA-ARTICULAR; INTRALESIONAL; INTRAMUSCULAR; INTRAVENOUS; SOFT TISSUE at 11:12

## 2024-12-12 RX ADMIN — ROCURONIUM BROMIDE 20 MG: 10 INJECTION, SOLUTION INTRAVENOUS at 02:12

## 2024-12-12 RX ADMIN — SODIUM CHLORIDE, SODIUM LACTATE, POTASSIUM CHLORIDE, AND CALCIUM CHLORIDE: .6; .31; .03; .02 INJECTION, SOLUTION INTRAVENOUS at 10:12

## 2024-12-12 RX ADMIN — MAGNESIUM SULFATE HEPTAHYDRATE 500 MG: 500 INJECTION, SOLUTION INTRAMUSCULAR; INTRAVENOUS at 11:12

## 2024-12-12 RX ADMIN — ESMOLOL HYDROCHLORIDE 20 MG: 100 INJECTION, SOLUTION INTRAVENOUS at 03:12

## 2024-12-12 RX ADMIN — SUCCINYLCHOLINE CHLORIDE 120 MG: 20 INJECTION, SOLUTION INTRAMUSCULAR; INTRAVENOUS; PARENTERAL at 11:12

## 2024-12-12 RX ADMIN — FENTANYL CITRATE 50 MCG: 50 INJECTION INTRAMUSCULAR; INTRAVENOUS at 04:12

## 2024-12-12 RX ADMIN — Medication 25 MG: at 12:12

## 2024-12-12 RX ADMIN — HYDROMORPHONE HYDROCHLORIDE 0.2 MG: 1 INJECTION, SOLUTION INTRAMUSCULAR; INTRAVENOUS; SUBCUTANEOUS at 06:12

## 2024-12-12 RX ADMIN — OXYCODONE HYDROCHLORIDE 10 MG: 10 TABLET ORAL at 05:12

## 2024-12-12 RX ADMIN — CEFAZOLIN 3 G: 2 INJECTION, POWDER, FOR SOLUTION INTRAMUSCULAR; INTRAVENOUS at 11:12

## 2024-12-12 RX ADMIN — FENTANYL CITRATE 50 MCG: 50 INJECTION INTRAMUSCULAR; INTRAVENOUS at 09:12

## 2024-12-12 RX ADMIN — ROCURONIUM BROMIDE 30 MG: 10 INJECTION, SOLUTION INTRAVENOUS at 11:12

## 2024-12-12 RX ADMIN — Medication 25 MG: at 01:12

## 2024-12-12 RX ADMIN — ESMOLOL HYDROCHLORIDE 30 MG: 100 INJECTION, SOLUTION INTRAVENOUS at 01:12

## 2024-12-12 RX ADMIN — ROCURONIUM BROMIDE 20 MG: 10 INJECTION, SOLUTION INTRAVENOUS at 12:12

## 2024-12-12 RX ADMIN — PROPOFOL 100 MG: 10 INJECTION, EMULSION INTRAVENOUS at 04:12

## 2024-12-12 RX ADMIN — ROPIVACAINE HYDROCHLORIDE 10 ML: 5 INJECTION EPIDURAL; INFILTRATION; PERINEURAL at 09:12

## 2024-12-12 RX ADMIN — TRANEXAMIC ACID 1000 MG: 100 INJECTION INTRAVENOUS at 03:12

## 2024-12-12 RX ADMIN — SODIUM CHLORIDE: 0.9 INJECTION, SOLUTION INTRAVENOUS at 11:12

## 2024-12-12 RX ADMIN — FENTANYL CITRATE 100 MCG: 50 INJECTION INTRAMUSCULAR; INTRAVENOUS at 11:12

## 2024-12-12 RX ADMIN — TRANEXAMIC ACID 1000 MG: 100 INJECTION INTRAVENOUS at 11:12

## 2024-12-12 RX ADMIN — BUPIVACAINE HYDROCHLORIDE AND EPINEPHRINE BITARTRATE 5 ML: 2.5; .0091 INJECTION, SOLUTION EPIDURAL; INFILTRATION; INTRACAUDAL; PERINEURAL at 09:12

## 2024-12-12 RX ADMIN — PHENYLEPHRINE HYDROCHLORIDE 100 MCG: 10 INJECTION INTRAVENOUS at 11:12

## 2024-12-12 NOTE — BRIEF OP NOTE
[facility] - Surgery ([facility])  Brief Operative Note    Surgery Date: 12/12/2024     Surgeons and Role:     * Jhony Berry MD - Primary     * Anum Valdez MD - Resident - Assisting        Pre-op Diagnosis:  Rupture of anterior cruciate ligament of left knee, initial encounter [S83.512A]  Tear of lateral collateral ligament of left knee, initial encounter [S83.422A]  Acute medial meniscal injury of left knee, subsequent encounter [S83.8X2D]  Injury of posterolateral corner of left knee, initial encounter [S89.92XA]  Synovitis of left knee [M65.962]  Complex tear of lateral meniscus of left knee as current injury, initial encounter [S83.272A]    Post-op Diagnosis:  Post-Op Diagnosis Codes:     * Rupture of anterior cruciate ligament of left knee, initial encounter [S83.512A]     * Tear of lateral collateral ligament of left knee, initial encounter [S83.422A]     * Acute medial meniscal injury of left knee, subsequent encounter [S83.8X2D]     * Injury of posterolateral corner of left knee, initial encounter [S89.92XA]     * Synovitis of left knee [M65.962]     * Complex tear of lateral meniscus of left knee as current injury, initial encounter [S83.272A]    Procedure(s) (LRB):  RECONSTRUCTION, KNEE, ACL, QUAD TENDON AUTOGRAFT (Left)  RECONSTRUCTION, POSTEROLATERAL CORNER (PLC) WITH ALLOGRAFT (Left)  RECONSTRUCTION, LIGAMENT, KNEE, EXTRA-ARTICULAR (Left)  ARTHROSCOPY,KNEE,WITH MENISCUS REPAIR (Left)  SYNOVECTOMY, KNEE, LIMITED, ARTHROSCOPIC (Left)  DECOMPRESSION, NERVE (Left)    Anesthesia: Regional    Operative Findings:  ACL tear, posterolateral corner injury, medial meniscus tear, lateral meniscus tear, synovitis    Estimated Blood Loss: * No values recorded between 12/12/2024 11:30 AM and 12/12/2024  4:16 PM *         Specimens:   Specimen (24h ago, onward)      None              Discharge Note    OUTCOME: Patient tolerated treatment/procedure well without complication and is now ready for  discharge.    DISPOSITION: Home or Self Care    FINAL DIAGNOSIS:  Injury of posterolateral corner of left knee    FOLLOWUP: In clinic    DISCHARGE INSTRUCTIONS:    Discharge Procedure Orders   Diet general     Leave dressing on - Keep it clean, dry, and intact until clinic visit     Call MD for:  temperature >100.4     Call MD for:  persistent nausea and vomiting     Call MD for:  severe uncontrolled pain     Call MD for:  difficulty breathing, headache or visual disturbances     Call MD for:  redness, tenderness, or signs of infection (pain, swelling, redness, odor or green/yellow discharge around incision site)     Call MD for:  hives     Call MD for:  persistent dizziness or light-headedness     Call MD for:  extreme fatigue
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
## 2024-12-12 NOTE — ANESTHESIA PROCEDURE NOTES
Intubation    Date/Time: 12/12/2024 11:09 AM    Performed by: Olivier Smith DO  Authorized by: Kacie Peters MD    Intubation:     Induction:  Rapid sequence induction    Intubated:  Postinduction    Mask Ventilation:  Not attempted    Attempts:  1    Attempted By:  Resident anesthesiologist    Method of Intubation:  Video laryngoscopy    Blade:  Garcia 3    Laryngeal View Grade: Grade I - full view of cords      Difficult Airway Encountered?: No      Complications:  None    Airway Device:  Oral endotracheal tube    Airway Device Size:  7.0    Style/Cuff Inflation:  Cuffed (inflated to minimal occlusive pressure)    Tube secured:  21    Secured at:  The lips    Placement Verified By:  Capnometry    Complicating Factors:  None    Findings Post-Intubation:  BS equal bilateral and atraumatic/condition of teeth unchanged

## 2024-12-12 NOTE — ANESTHESIA PROCEDURE NOTES
L adductor PNC    Patient location during procedure: pre-op   Block not for primary anesthetic.  Reason for block: at surgeon's request and post-op pain management   Post-op Pain Location: L knee pain   Start time: 12/12/2024 9:05 AM  Timeout: 12/12/2024 9:05 AM   End time: 12/12/2024 9:25 AM    Staffing  Authorizing Provider: Ajay Jones MD  Performing Provider: Sindi Rodriguez MD    Staffing  Performed by: Sindi Rodriguez MD  Authorized by: Ajay Jones MD    Preanesthetic Checklist  Completed: patient identified, IV checked, site marked, risks and benefits discussed, surgical consent, monitors and equipment checked, pre-op evaluation and timeout performed  Peripheral Block  Patient position: supine  Prep: ChloraPrep and site prepped and draped  Patient monitoring: heart rate, cardiac monitor, continuous pulse ox, continuous capnometry and frequent blood pressure checks  Block type: adductor canal  Laterality: left  Injection technique: continuous  Needle  Needle type: Tuohy   Needle gauge: 17 G  Needle length: 3.5 in  Needle localization: anatomical landmarks and ultrasound guidance  Catheter type: spring wound  Catheter size: 19 G  Test dose: lidocaine 1.5% with Epi 1-to-200,000 and negative   -ultrasound image captured on disc.  Assessment  Injection assessment: negative aspiration, negative parasthesia and local visualized surrounding nerve  Paresthesia pain: none  Heart rate change: no  Slow fractionated injection: yes  Pain Tolerance: comfortable throughout block  Medications:    Medications: ropivacaine (NAROPIN) injection 0.5% - Perineural   10 mL - 12/12/2024 9:25:00 AM    Additional Notes  VSS.  [facility] RN monitoring vitals throughout procedure.  Patient tolerated procedure well.   20 cc 0.25% ropivacaine with 1:300,000 epi administered

## 2024-12-12 NOTE — ANESTHESIA POST-OP PAIN MANAGEMENT
"Acute Pain Service Progress Note    Patient & family educated regarding CADD infusion. Educated on signs of local anesthetic toxicity and reminded that PNC dressing should remain clean, dry, and intact. All questions answered. Reminded patient that the infusion should be discontinued and PNC removed whenever the "infusion complete" alert is seen on the display screen or by POD 5 (  12/17/24   ) at 12pm, whichever comes first. Encouraged patient to call the CADD 24/7 support line or the [facility] line numbers listed on the back of provided brochure for any CADD related questions/issues. Verbalizes understanding.          "

## 2024-12-12 NOTE — ANESTHESIA PROCEDURE NOTES
L superomedial genicular nerve block    Patient location during procedure: pre-op   Block not for primary anesthetic.  Reason for block: at surgeon's request and post-op pain management   Post-op Pain Location: L medial knee pain   Start time: 12/12/2024 9:16 AM  Timeout: 12/12/2024 9:15 AM   End time: 12/12/2024 9:25 AM    Staffing  Authorizing Provider: Ajay Jones MD  Performing Provider: Sindi Rodriguez MD    Staffing  Performed by: Sindi Rodriguez MD  Authorized by: Ajay Jones MD    Preanesthetic Checklist  Completed: patient identified, IV checked, site marked, risks and benefits discussed, surgical consent, monitors and equipment checked, pre-op evaluation and timeout performed  Peripheral Block  Patient position: supine  Prep: ChloraPrep  Patient monitoring: heart rate, cardiac monitor, continuous pulse ox, continuous capnometry and frequent blood pressure checks  Block type: Genicular Superomedial  Laterality: left  Injection technique: single shot  Needle  Needle type: Stimuplex   Needle gauge: 20 G  Needle length: 4 in  Needle localization: anatomical landmarks and ultrasound guidance   -ultrasound image captured on disc.  Assessment  Injection assessment: negative aspiration, negative parasthesia and local visualized surrounding nerve  Paresthesia pain: none  Heart rate change: no  Slow fractionated injection: yes  Pain Tolerance: comfortable throughout block  Medications:    Medications: bupivacaine 0.25%-EPINEPHrine (PF) 1:200,000 injection - Other   5 mL - 12/12/2024 9:25:00 AM    Additional Notes  VSS.  [facility] RN monitoring vitals throughout procedure.  Patient tolerated procedure well.

## 2024-12-12 NOTE — TRANSFER OF CARE
"Anesthesia Transfer of Care Note    Patient: Felicity Cohen    Procedure(s) Performed: Procedure(s) (LRB):  RECONSTRUCTION, KNEE, ACL, QUAD TENDON AUTOGRAFT (Left)  RECONSTRUCTION, POSTEROLATERAL CORNER (PLC) WITH ALLOGRAFT (Left)  RECONSTRUCTION, LIGAMENT, KNEE, EXTRA-ARTICULAR (Left)  ARTHROSCOPY,KNEE,WITH MENISCUS REPAIR (Left)  SYNOVECTOMY, KNEE, LIMITED, ARTHROSCOPIC (Left)  DECOMPRESSION, NERVE (Left)    Patient location: [facility]    Anesthesia Type: general    Transport from OR: Transported from OR on 6-10 L/min O2 by face mask with adequate spontaneous ventilation    Post pain: adequate analgesia    Post assessment: no apparent anesthetic complications and tolerated procedure well    Post vital signs: stable    Level of consciousness: awake and alert    Nausea/Vomiting: no nausea/vomiting    Complications: none    Transfer of care protocol was followed      Last vitals: Visit Vitals  /70   Pulse 109   Temp 37.1 °C (98.7 °F) (Oral)   Resp (!) 25   Ht 5' 5" (1.651 m)   Wt (!) 155.6 kg (343 lb)   LMP 11/19/2024   SpO2 100%   Breastfeeding No   BMI 57.08 kg/m²     "

## 2024-12-13 ENCOUNTER — CLINICAL SUPPORT (OUTPATIENT)
Dept: REHABILITATION | Facility: HOSPITAL | Age: 24
End: 2024-12-13
Attending: ORTHOPAEDIC SURGERY
Payer: MEDICAID

## 2024-12-13 DIAGNOSIS — S83.512A RUPTURE OF ANTERIOR CRUCIATE LIGAMENT OF LEFT KNEE, INITIAL ENCOUNTER: ICD-10-CM

## 2024-12-13 DIAGNOSIS — M25.562 ACUTE PAIN OF LEFT KNEE: Primary | ICD-10-CM

## 2024-12-13 PROCEDURE — 97164 PT RE-EVAL EST PLAN CARE: CPT

## 2024-12-13 PROCEDURE — 97110 THERAPEUTIC EXERCISES: CPT

## 2024-12-13 NOTE — ANESTHESIA POSTPROCEDURE EVALUATION
Anesthesia Post Evaluation    Patient: Felicity Cohen    Procedure(s) Performed: Procedure(s) (LRB):  RECONSTRUCTION, KNEE, ACL, QUAD TENDON AUTOGRAFT (Left)  RECONSTRUCTION, POSTEROLATERAL CORNER (PLC) WITH ALLOGRAFT (Left)  RECONSTRUCTION, LIGAMENT, KNEE, EXTRA-ARTICULAR (Left)  ARTHROSCOPY,KNEE,WITH MENISCUS REPAIR (Left)  SYNOVECTOMY, KNEE, LIMITED, ARTHROSCOPIC (Left)  DECOMPRESSION, NERVE (Left)    Final Anesthesia Type: general      Patient location during evaluation: PACU  Patient participation: Yes- Able to Participate  Level of consciousness: awake and alert  Post-procedure vital signs: reviewed and stable  Pain management: adequate  Airway patency: patent    PONV status at discharge: No PONV  Anesthetic complications: no      Cardiovascular status: blood pressure returned to baseline  Respiratory status: spontaneous ventilation and room air  Hydration status: euvolemic  Follow-up not needed.              Vitals Value Taken Time   /88 12/12/24 1900   Temp 37.1 °C (98.8 °F) 12/12/24 1900   Pulse 106 12/12/24 1900   Resp 15 12/12/24 1900   SpO2 97 % 12/12/24 1900         No case tracking events are documented in the log.      Pain/Steven Score: Pain Rating Prior to Med Admin: 7 (12/12/2024  6:10 PM)  Pain Rating Post Med Admin: 0 (12/12/2024 10:49 AM)  Steven Score: 10 (12/12/2024  7:00 PM)

## 2024-12-13 NOTE — PLAN OF CARE
Pt Aox4. VSS. No signs of distress. Patient and pt's mother educated and given discharge instructions at bedside. All questions answered. IV removed. Pt urinated. Pt tolerating PO liquids. Pt ready for discharge.

## 2024-12-15 NOTE — OPERATIVE NOTE ADDENDUM
Certification of Assistant at Surgery       Surgery Date: 12/12/2024     Participating Surgeons:  Surgeons and Role:     * Jhony Berry MD - Primary     * Anum Valdez MD - Resident - Assisting    Procedures:  Procedure(s) (LRB):  RECONSTRUCTION, KNEE, ACL, QUAD TENDON AUTOGRAFT (Left)  RECONSTRUCTION, POSTEROLATERAL CORNER (PLC) WITH ALLOGRAFT (Left)  RECONSTRUCTION, LIGAMENT, KNEE, EXTRA-ARTICULAR (Left)  ARTHROSCOPY,KNEE,WITH MENISCUS REPAIR (Left)  SYNOVECTOMY, KNEE, LIMITED, ARTHROSCOPIC (Left)  DECOMPRESSION, NERVE (Left)    Assistant Surgeon's Certification of Necessity:  I understand that section 1842 (b) (6) (d) of the Social Security Act generally prohibits Medicare Part B reasonable charge payment for the services of assistants at surgery in teaching hospitals when qualified residents are available to furnish such services. I certify that the services for which payment is claimed were medically necessary, and that no qualified resident was available to perform the services. I further understand that these services are subject to post-payment review by the Medicare carrier.      Anum Valdez MD    12/15/2024  4:23 PM

## 2024-12-16 ENCOUNTER — TELEPHONE (OUTPATIENT)
Dept: SPORTS MEDICINE | Facility: CLINIC | Age: 24
End: 2024-12-16
Payer: MEDICAID

## 2024-12-16 NOTE — TELEPHONE ENCOUNTER
----- Message from Genevieve sent at 12/16/2024 10:36 AM CST -----  Regarding: Consult Advisory  Contact: Felicity ALVAREZ  CONSULT/ADVISORY    Name of Caller: Felicity     Contact Preference: [phone]    Nature of Call:  Patient would like to know if ICE machine has been ordered if not if she can purchase directly from doctor. She would also like to know the price for it.

## 2024-12-16 NOTE — TELEPHONE ENCOUNTER
Spoke with pt and let her know someone from DME will give her a call to get her payment for the polar care and then someone will need to pick it up from our [place] location

## 2024-12-17 ENCOUNTER — CLINICAL SUPPORT (OUTPATIENT)
Dept: REHABILITATION | Facility: HOSPITAL | Age: 24
End: 2024-12-17
Payer: MEDICAID

## 2024-12-17 DIAGNOSIS — M25.562 ACUTE PAIN OF LEFT KNEE: Primary | ICD-10-CM

## 2024-12-17 PROCEDURE — 97110 THERAPEUTIC EXERCISES: CPT | Mod: CQ

## 2024-12-17 PROCEDURE — 97140 MANUAL THERAPY 1/> REGIONS: CPT | Mod: CQ

## 2024-12-17 PROCEDURE — 97112 NEUROMUSCULAR REEDUCATION: CPT | Mod: CQ

## 2024-12-17 NOTE — PROGRESS NOTES
[facility]   Physical Therapy Treatment Note     Name: Felicity Cohen  Clinic Number: [id number]    Therapy Diagnosis: No diagnosis found.  Physician: Jhony Berry MD    Visit Date: 12/17/2024    Physician Orders: PT Eval and Treat   Medical Diagnosis from Referral:   S83.512A (ICD-10-CM) - Rupture of anterior cruciate ligament of left knee, initial encounter   S83.422A (ICD-10-CM) - Tear of lateral collateral ligament of left knee, initial encounter   S83.8X2D (ICD-10-CM) - Acute medial meniscal injury of left knee, subsequent encounter      Evaluation Date: 10/24/2024  Re-evaluation date: 12/13/2024  Authorization Period Expiration: 12/31/25  Plan of Care Expiration: 12/31/25  Visit # / Visits authorized: 2/ 20 (8 tot)  FOTO: 1/3     Time In: 11:00 am  Time Out: 12:00 pm  Total Billable Time: 60 minutes     Precautions: Standard, post op  PROCEDURE PERFORMED:   Left ACL reconstruction with quadriceps tendon autograft   Left posterolateral corner reconstruction with allograft  Left knee lateral extra-articular tenodesis  Left knee arthroscopic medial meniscus repair   Left knee arthroscopic lateral meniscus repair   Left knee arthroscopic synovectomy   Left knee arthroscopic lysis of adhesions   Left knee open peroneal nerve neurolysis      SUBJECTIVE     Pt reports: hurts when I put leg down. Slipped the other day and put some weight on R toes.     She was compliant with home exercise program.  Response to previous treatment: none  Functional change:  on going    Pain: 7/10  Location: left knee      OBJECTIVE     2-0-50    Treatment       Felicity received the treatments listed below:      Therapeutic exercises to develop strength, endurance, ROM, posture, and core stabilization for 15 minutes including:  Heel prop x 5 min  Pt education  Brace adjustment    Manual therapy techniques: Joint mobilizations were applied to the: L knee for 25 minutes, including:  ROM check  Knee  flexion off EOT  Extension hinge  Patellar/fat pad mobs  Post-op bandage removal    Therapeutic activities to improve functional performance for 00  minutes, including:      Neuromuscular re-education activities to improve: Balance, Coordination, Kinesthetic, Proprioception, and Posture for 20 minutes. The following activities were included:  NMES with QS 10 sec on/50 sec off x 15 min  Heel slide with QS (towel under knee) x 3 min        Patient Education and Home Exercises     Home Exercises Provided and Patient Education Provided     Education provided:   - cont HEP, heel slides with brace donned    Written Home Exercises Provided: Patient instructed to cont prior HEP. Exercises were reviewed and Felicity was able to demonstrate them prior to the end of the session.  Felicity demonstrated good  understanding of the education provided. See EMR under Patient Instructions for exercises provided during therapy sessions    ASSESSMENT     Felicity presents to day with her brace lower than appropriate fit and surgical bandages below her knee. Some swelling noted. Poor quad activation. Improved after NMES. Pt told she can perform PROM heel slides at home with brace donned. Noted anterior knee symptoms with PROM flexion off EOT. These symptoms improved with inferior patellar glide.     Felicity Is progressing well towards her goals.     Pt prognosis is Good.     Pt will continue to benefit from skilled outpatient physical therapy to address the deficits listed in the problem list box on initial evaluation, provide pt/family education and to maximize pt's level of independence in the home and community environment.     Pt's spiritual, cultural and educational needs considered and pt agreeable to plan of care and goals.     Anticipated barriers to physical therapy: none    Goals:   Short Term Goals: 6 weeks Post op  - Pt will increase ROM to +5 L knee hyperextension, 90 deg flex L knee  - Decrease Pain to 3/10 as worst with  all PT interventions  - Pt to self correct posture and gait with no AD or brace and minimal cues  - Pt independent with HEP with progressions.      Long Term Goals ( 1 year):  - Pt will increase ROM to 0 deg L knee hyperextension and 110 deg flexion  - Pt will increase strength to 5/5 L knee in all planes  - Decrease Pain to 0/10 as worst with stairs, walking and lifting weights  - Pt to return to 80% PLOF    PLAN     Plan of care Certification: 12/13/2024 to 12/31/25.     Outpatient Physical Therapy 2 times weekly for 52 weeks to include the following interventions: Gait Training, Manual Therapy, Moist Heat/ Ice, Neuromuscular Re-ed, Patient Education, Therapeutic Activities, and Therapeutic Exercise.    Edwina Kelly, PTA

## 2024-12-19 ENCOUNTER — CLINICAL SUPPORT (OUTPATIENT)
Dept: REHABILITATION | Facility: HOSPITAL | Age: 24
End: 2024-12-19
Payer: MEDICAID

## 2024-12-19 DIAGNOSIS — M25.562 ACUTE PAIN OF LEFT KNEE: Primary | ICD-10-CM

## 2024-12-19 PROCEDURE — 97110 THERAPEUTIC EXERCISES: CPT

## 2024-12-19 NOTE — PROGRESS NOTES
[facility]   Physical Therapy Treatment Note     Name: Felicity Coehn  Clinic Number: [id number]    Therapy Diagnosis:   Encounter Diagnosis   Name Primary?    Acute pain of left knee Yes     Physician: Jhony Berry MD    Visit Date: 12/19/2024    Physician Orders: PT Eval and Treat   Medical Diagnosis from Referral:   S83.512A (ICD-10-CM) - Rupture of anterior cruciate ligament of left knee, initial encounter   S83.422A (ICD-10-CM) - Tear of lateral collateral ligament of left knee, initial encounter   S83.8X2D (ICD-10-CM) - Acute medial meniscal injury of left knee, subsequent encounter      Evaluation Date: 10/24/2024  Re-evaluation date: 12/13/2024  Authorization Period Expiration: 12/31/25  Plan of Care Expiration: 12/31/25  Visit # / Visits authorized: 2/ 20 (8 tot)  FOTO: 1/3     Time In: 11:00 am  Time Out: 12:00 pm  Total Billable Time: 60 minutes (PT extender used in line of sight)     Precautions: Standard, post op  PROCEDURE PERFORMED:   Left ACL reconstruction with quadriceps tendon autograft   Left posterolateral corner reconstruction with allograft  Left knee lateral extra-articular tenodesis  Left knee arthroscopic medial meniscus repair   Left knee arthroscopic lateral meniscus repair   Left knee arthroscopic synovectomy   Left knee arthroscopic lysis of adhesions   Left knee open peroneal nerve neurolysis      SUBJECTIVE     Pt reports: having a hard time with crutches and pain medications feeling dizzy.    She was compliant with home exercise program.  Response to previous treatment: none  Functional change:  on going    Pain: 7/10  Location: left knee      OBJECTIVE     2-0-50    Treatment       Felicity received the treatments listed below:      Therapeutic exercises to develop strength, endurance, ROM, posture, and core stabilization for 20 minutes including:  Heel prop x 5 min  Ankle pumps and circles  Pt education  Brace adjustment    Manual therapy  techniques: Joint mobilizations were applied to the: L knee for 10 minutes, including:  ROM check  Knee flexion off EOT  Extension hinge  Patellar/fat pad mobs    Therapeutic activities to improve functional performance for 00  minutes, including:      Neuromuscular re-education activities to improve: Balance, Coordination, Kinesthetic, Proprioception, and Posture for 30 minutes. The following activities were included:  NMES with QS 10 sec on/50 sec off x 15 min  Heel slide with QS (towel under knee) x 5 min  SLR with brace and strap x10      *BILLED PER MEDICAID GUIDELINES*    Patient Education and Home Exercises     Home Exercises Provided and Patient Education Provided     Education provided:   - cont HEP, heel slides with brace donned    Written Home Exercises Provided: Patient instructed to cont prior HEP. Exercises were reviewed and Felicity was able to demonstrate them prior to the end of the session.  Felicity demonstrated good  understanding of the education provided. See EMR under Patient Instructions for exercises provided during therapy sessions    ASSESSMENT     Felicity still presenting with old brace noting fitting ideally. Pt educated to bring both braces in next session and will as [employer] to assist. Pt tolerated assisted SLR fairly well and instructed to add to HEP per protocol. Plan to progress as tolerated.     Felicity Is progressing well towards her goals.     Pt prognosis is Good.     Pt will continue to benefit from skilled outpatient physical therapy to address the deficits listed in the problem list box on initial evaluation, provide pt/family education and to maximize pt's level of independence in the home and community environment.     Pt's spiritual, cultural and educational needs considered and pt agreeable to plan of care and goals.     Anticipated barriers to physical therapy: none    Goals:   Short Term Goals: 6 weeks Post op  - Pt will increase ROM to +5 L knee hyperextension, 90 deg  flex L knee  - Decrease Pain to 3/10 as worst with all PT interventions  - Pt to self correct posture and gait with no AD or brace and minimal cues  - Pt independent with HEP with progressions.      Long Term Goals ( 1 year):  - Pt will increase ROM to 0 deg L knee hyperextension and 110 deg flexion  - Pt will increase strength to 5/5 L knee in all planes  - Decrease Pain to 0/10 as worst with stairs, walking and lifting weights  - Pt to return to 80% PLOF    PLAN     Plan of care Certification: 12/13/2024 to 12/31/25.     Outpatient Physical Therapy 2 times weekly for 52 weeks to include the following interventions: Gait Training, Manual Therapy, Moist Heat/ Ice, Neuromuscular Re-ed, Patient Education, Therapeutic Activities, and Therapeutic Exercise.    Deborah Coe, PT

## 2024-12-24 ENCOUNTER — CLINICAL SUPPORT (OUTPATIENT)
Dept: REHABILITATION | Facility: HOSPITAL | Age: 24
End: 2024-12-24
Payer: MEDICAID

## 2024-12-24 DIAGNOSIS — M25.562 ACUTE PAIN OF LEFT KNEE: Primary | ICD-10-CM

## 2024-12-24 PROCEDURE — 97110 THERAPEUTIC EXERCISES: CPT | Mod: CQ

## 2024-12-24 NOTE — PROGRESS NOTES
[facility]   Physical Therapy Treatment Note     Name: Felicity Cohen  Clinic Number: [id number]    Therapy Diagnosis:   Encounter Diagnosis   Name Primary?    Acute pain of left knee Yes     Physician: Jhony Berry MD    Visit Date: 12/24/2024    Physician Orders: PT Eval and Treat   Medical Diagnosis from Referral:   S83.512A (ICD-10-CM) - Rupture of anterior cruciate ligament of left knee, initial encounter   S83.422A (ICD-10-CM) - Tear of lateral collateral ligament of left knee, initial encounter   S83.8X2D (ICD-10-CM) - Acute medial meniscal injury of left knee, subsequent encounter      Evaluation Date: 10/24/2024  Re-evaluation date: 12/13/2024  Authorization Period Expiration: 12/31/25  Plan of Care Expiration: 12/31/25  Visit # / Visits authorized: 3/ 20 (10 tot)  FOTO: 1/3     Time In: 11:00 am  Time Out: 12:00 pm  Total Billable Time: 60 minutes (PT extender used in line of sight)     Precautions: Standard, post op  PROCEDURE PERFORMED:   Left ACL reconstruction with quadriceps tendon autograft   Left posterolateral corner reconstruction with allograft  Left knee lateral extra-articular tenodesis  Left knee arthroscopic medial meniscus repair   Left knee arthroscopic lateral meniscus repair   Left knee arthroscopic synovectomy   Left knee arthroscopic lysis of adhesions   Left knee open peroneal nerve neurolysis      SUBJECTIVE     Pt reports: Trying to wean off pain meds    She was compliant with home exercise program.  Response to previous treatment: none  Functional change:  on going    Pain: 7/10  Location: left knee      OBJECTIVE     2-0-50    Treatment       Felicity received the treatments listed below:      Therapeutic exercises to develop strength, endurance, ROM, posture, and core stabilization for 05 minutes including:    Ankle pumps with BTB      Manual therapy techniques: Joint mobilizations were applied to the: L knee for 20 minutes, including:  ROM  check  Knee flexion off EOT  Extension hinge  Patellar/fat pad mobs  EOT knee flexion    Therapeutic activities to improve functional performance for 00  minutes, including:      Neuromuscular re-education activities to improve: Balance, Coordination, Kinesthetic, Proprioception, and Posture for 35 minutes. The following activities were included:  NMES with QS 10 sec on/50 sec off x 15 min  Heel slide with QS (towel under knee) x 5 min  SLR off EOT      *BILLED PER MEDICAID GUIDELINES*    Patient Education and Home Exercises     Home Exercises Provided and Patient Education Provided     Education provided:   - cont HEP, heel slides with brace donned    Written Home Exercises Provided: Patient instructed to cont prior HEP. Exercises were reviewed and Felicity was able to demonstrate them prior to the end of the session.  Felicity demonstrated good  understanding of the education provided. See EMR under Patient Instructions for exercises provided during therapy sessions    ASSESSMENT     Felicity did not bring in her other brace to be fitted by [employer]. Quad activation is improving. Was able to perform SLR off EOT. Knee flexion stiff at 50 degrees with medial knee symptoms. These slightly improve with manual inferior patellar glides.    Felicity Is progressing well towards her goals.     Pt prognosis is Good.     Pt will continue to benefit from skilled outpatient physical therapy to address the deficits listed in the problem list box on initial evaluation, provide pt/family education and to maximize pt's level of independence in the home and community environment.     Pt's spiritual, cultural and educational needs considered and pt agreeable to plan of care and goals.     Anticipated barriers to physical therapy: none    Goals:   Short Term Goals: 6 weeks Post op  - Pt will increase ROM to +5 L knee hyperextension, 90 deg flex L knee  - Decrease Pain to 3/10 as worst with all PT interventions  - Pt to self correct  posture and gait with no AD or brace and minimal cues  - Pt independent with HEP with progressions.      Long Term Goals ( 1 year):  - Pt will increase ROM to 0 deg L knee hyperextension and 110 deg flexion  - Pt will increase strength to 5/5 L knee in all planes  - Decrease Pain to 0/10 as worst with stairs, walking and lifting weights  - Pt to return to 80% PLOF    PLAN     Plan of care Certification: 12/13/2024 to 12/31/25.     Outpatient Physical Therapy 2 times weekly for 52 weeks to include the following interventions: Gait Training, Manual Therapy, Moist Heat/ Ice, Neuromuscular Re-ed, Patient Education, Therapeutic Activities, and Therapeutic Exercise.    Edwina Kelly, PTA

## 2024-12-27 ENCOUNTER — OFFICE VISIT (OUTPATIENT)
Dept: SPORTS MEDICINE | Facility: CLINIC | Age: 24
End: 2024-12-27
Payer: MEDICAID

## 2024-12-27 ENCOUNTER — CLINICAL SUPPORT (OUTPATIENT)
Dept: REHABILITATION | Facility: HOSPITAL | Age: 24
End: 2024-12-27
Payer: MEDICAID

## 2024-12-27 VITALS
DIASTOLIC BLOOD PRESSURE: 79 MMHG | HEIGHT: 65 IN | BODY MASS INDEX: 57.08 KG/M2 | SYSTOLIC BLOOD PRESSURE: 138 MMHG | HEART RATE: 105 BPM

## 2024-12-27 DIAGNOSIS — M25.562 ACUTE PAIN OF LEFT KNEE: Primary | ICD-10-CM

## 2024-12-27 DIAGNOSIS — Z98.890 S/P ACL RECONSTRUCTION: Primary | ICD-10-CM

## 2024-12-27 PROCEDURE — 3075F SYST BP GE 130 - 139MM HG: CPT | Mod: CPTII,,, | Performed by: ORTHOPAEDIC SURGERY

## 2024-12-27 PROCEDURE — 99024 POSTOP FOLLOW-UP VISIT: CPT | Mod: ,,, | Performed by: ORTHOPAEDIC SURGERY

## 2024-12-27 PROCEDURE — 3078F DIAST BP <80 MM HG: CPT | Mod: CPTII,,, | Performed by: ORTHOPAEDIC SURGERY

## 2024-12-27 PROCEDURE — 1159F MED LIST DOCD IN RCRD: CPT | Mod: CPTII,,, | Performed by: ORTHOPAEDIC SURGERY

## 2024-12-27 PROCEDURE — 99213 OFFICE O/P EST LOW 20 MIN: CPT | Mod: PBBFAC | Performed by: ORTHOPAEDIC SURGERY

## 2024-12-27 PROCEDURE — 99999 PR PBB SHADOW E&M-EST. PATIENT-LVL III: CPT | Mod: PBBFAC,,, | Performed by: ORTHOPAEDIC SURGERY

## 2024-12-27 PROCEDURE — 97110 THERAPEUTIC EXERCISES: CPT

## 2024-12-27 PROCEDURE — 1160F RVW MEDS BY RX/DR IN RCRD: CPT | Mod: CPTII,,, | Performed by: ORTHOPAEDIC SURGERY

## 2024-12-27 RX ORDER — OXYCODONE HYDROCHLORIDE 5 MG/1
5 TABLET ORAL EVERY 6 HOURS PRN
Qty: 21 TABLET | Refills: 0 | Status: SHIPPED | OUTPATIENT
Start: 2024-12-27

## 2024-12-27 NOTE — PROGRESS NOTES
[facility]   Physical Therapy Treatment Note     Name: Felicity Cohen  Clinic Number: [id number]    Therapy Diagnosis:   Encounter Diagnosis   Name Primary?    Acute pain of left knee Yes     Physician: Jhony Berry MD    Visit Date: 12/27/2024    Physician Orders: PT Eval and Treat   Medical Diagnosis from Referral:   S83.512A (ICD-10-CM) - Rupture of anterior cruciate ligament of left knee, initial encounter   S83.422A (ICD-10-CM) - Tear of lateral collateral ligament of left knee, initial encounter   S83.8X2D (ICD-10-CM) - Acute medial meniscal injury of left knee, subsequent encounter      Evaluation Date: 10/24/2024  Re-evaluation date: 12/13/2024  Authorization Period Expiration: 12/31/25  Plan of Care Expiration: 12/31/25  Visit # / Visits authorized: 11/ 20   FOTO: 1/3     Time In: 10:00 am  Time Out: 11:00 pm  Total Billable Time: 60 minutes (PT extender used in line of sight)     Precautions: Standard, post op  PROCEDURE PERFORMED:   Left ACL reconstruction with quadriceps tendon autograft   Left posterolateral corner reconstruction with allograft  Left knee lateral extra-articular tenodesis  Left knee arthroscopic medial meniscus repair   Left knee arthroscopic lateral meniscus repair   Left knee arthroscopic synovectomy   Left knee arthroscopic lysis of adhesions   Left knee open peroneal nerve neurolysis      SUBJECTIVE     Pt reports: had a good follow up with MD.     She was compliant with home exercise program.  Response to previous treatment: none  Functional change:  on going    Pain: 7/10  Location: left knee      OBJECTIVE     2-0-50    Treatment       Felicity received the treatments listed below:      Therapeutic exercises to develop strength, endurance, ROM, posture, and core stabilization for 60 minutes including:  Ankle pumps with BTB  Brace check and gait review  ROM check-np  Knee flexion off EOT  Extension hinge  Patellar/fat pad mobs  EOT knee  flexion  NMES with QS 10 sec on/50 sec off x 20 min  Heel slide with QS (towel under knee) x 5 min  SLR off EOT-np  SLR with brace and strap 2x10  SL hip abd with brace 2x10    *BILLED PER MEDICAID GUIDELINES*    Patient Education and Home Exercises     Home Exercises Provided and Patient Education Provided     Education provided:   - cont HEP, heel slides with brace donned    Written Home Exercises Provided: Patient instructed to cont prior HEP. Exercises were reviewed and Felicity was able to demonstrate them prior to the end of the session.  Felictiy demonstrated good  understanding of the education provided. See EMR under Patient Instructions for exercises provided during therapy sessions    ASSESSMENT     Felicity tolerated leg raises better with assistance and is tolerating transitional movements well. Plan to progress per protocol and pt tolerance.     Felicity Is progressing well towards her goals.     Pt prognosis is Good.     Pt will continue to benefit from skilled outpatient physical therapy to address the deficits listed in the problem list box on initial evaluation, provide pt/family education and to maximize pt's level of independence in the home and community environment.     Pt's spiritual, cultural and educational needs considered and pt agreeable to plan of care and goals.     Anticipated barriers to physical therapy: none    Goals:   Short Term Goals: 6 weeks Post op  - Pt will increase ROM to +5 L knee hyperextension, 90 deg flex L knee  - Decrease Pain to 3/10 as worst with all PT interventions  - Pt to self correct posture and gait with no AD or brace and minimal cues  - Pt independent with HEP with progressions.      Long Term Goals ( 1 year):  - Pt will increase ROM to 0 deg L knee hyperextension and 110 deg flexion  - Pt will increase strength to 5/5 L knee in all planes  - Decrease Pain to 0/10 as worst with stairs, walking and lifting weights  - Pt to return to 80% PLOF    PLAN     Plan  of care Certification: 12/13/2024 to 12/31/25.     Outpatient Physical Therapy 2 times weekly for 52 weeks to include the following interventions: Gait Training, Manual Therapy, Moist Heat/ Ice, Neuromuscular Re-ed, Patient Education, Therapeutic Activities, and Therapeutic Exercise.    Deborah Coe, PT

## 2024-12-30 NOTE — PROGRESS NOTES
Subjective:     Chief Complaint: Felicity Cohen is a 24 y.o. female who had concerns including Pain of the Left Knee.    HPI    Patient is here s/p below for 2 week post-op appointment. She reports 8/10 pain and is taking oxycodone every day for pain. She denies any nausea, vomiting, fever, chills, shortness of breath, or calf pain. She is attending formal physical therapy at [facility].  T scope knee brace in place.     PROCEDURE PERFORMED by Jhony Berry MD on 12/12/2024:   Left ACL reconstruction with quadriceps tendon autograft   Left posterolateral corner reconstruction with allograft  Left knee lateral extra-articular tenodesis  Left knee arthroscopic medial meniscus repair   Left knee arthroscopic lateral meniscus repair   Left knee arthroscopic synovectomy   Left knee arthroscopic lysis of adhesions   Left knee open peroneal nerve neurolysis    Review of Systems   Constitutional: Negative.   HENT: Negative.     Eyes: Negative.    Cardiovascular: Negative.    Respiratory: Negative.     Endocrine: Negative.    Hematologic/Lymphatic: Negative.    Skin: Negative.    Musculoskeletal:  Positive for joint pain, joint swelling, muscle weakness, myalgias and stiffness. Negative for falls.   Neurological: Negative.    Psychiatric/Behavioral: Negative.     Allergic/Immunologic: Negative.        Pain Related Questions  Over the past 3 days, what was your average pain during activity? (I.e. running, jogging, walking, climbing stairs, getting dressed, ect.): 9  Over the past 3 days, what was your highest pain level?: 9  Over the past 3 days, what was your lowest pain level? : 5    Other  Was the patient's HEIGHT measured or patient reported?: Patient Reported  Was the patient's WEIGHT measured or patient reported?: Measured    Objective:     General: Felicity MENJIVAR is well-developed, well-nourished, appears stated age, in no acute distress, alert and oriented to time, place and person.     General    Constitutional:  She is oriented to person, place, and time. She appears well-developed and well-nourished. No distress.   Cardiovascular:  Intact distal pulses.            Neurological: She is alert and oriented to person, place, and time.   Psychiatric: She has a normal mood and affect. Her behavior is normal. Thought content normal.             Left Knee Exam     Inspection   Erythema: absent  Scars: present  Swelling: present  Effusion: absent  Deformity: absent  Bruising: absent    Range of Motion   Extension:  0   Flexion:  50     Other   Sensation: normal    Comments:  Incision sites healing well, without signs of erythema, infection, or wound dehiscence      Vascular Exam       Left Pulses  Dorsalis Pedis:      2+  Posterior Tibial:      2+        Edema  Left Lower Leg: absent          Assessment:     Encounter Diagnosis   Name Primary?    S/P ACL reconstruction Yes        Plan:     1. Patient instructed to continue to utilize hinged T-scope knee brace until we see him again for 6 week post-op appt. Must be locked in extension during ambulation, but can bend the knee from 0-90 degrees at other times.  She will continue to be flatfoot weight-bearing for 2 more weeks.    2. Suture tags removed Steri-Strips applied. Patient may now shower without covering incision site. Instructed not to submerge incision site in water for another 2 weeks    3. Continue daily ASA and Celebrex b.i.d.    4. Continue [facility] PT per multi ligamentous reconstruction protocol.     5. RTC to see Jhony Berry MD in 4 weeks for 6 week post-op evaluation      All of the patient's questions were answered. Patient was advised to call the clinic or contact me through the patient portal for any questions or concerns.         Patient questionnaires may have been collected.

## 2025-01-03 ENCOUNTER — CLINICAL SUPPORT (OUTPATIENT)
Dept: REHABILITATION | Facility: HOSPITAL | Age: 25
End: 2025-01-03
Payer: MEDICAID

## 2025-01-03 DIAGNOSIS — M25.562 ACUTE PAIN OF LEFT KNEE: Primary | ICD-10-CM

## 2025-01-03 PROCEDURE — 97110 THERAPEUTIC EXERCISES: CPT

## 2025-01-03 NOTE — PROGRESS NOTES
[facility]   Physical Therapy Treatment Note     Name: Felicity Cohen  Clinic Number: [id number]    Therapy Diagnosis:   Encounter Diagnosis   Name Primary?    Acute pain of left knee Yes     Physician: Jhony Berry MD    Visit Date: 1/3/2025    Physician Orders: PT Eval and Treat   Medical Diagnosis from Referral:   S83.512A (ICD-10-CM) - Rupture of anterior cruciate ligament of left knee, initial encounter   S83.422A (ICD-10-CM) - Tear of lateral collateral ligament of left knee, initial encounter   S83.8X2D (ICD-10-CM) - Acute medial meniscal injury of left knee, subsequent encounter      Evaluation Date: 10/24/2024  Re-evaluation date: 12/13/2024  Authorization Period Expiration: 12/31/25  Plan of Care Expiration: 12/31/25  Visit # / Visits authorized: 11/ 20   FOTO: 1/3     Time In: 10:00 am  Time Out: 11:00 pm  Total Billable Time: 60 minutes (PT extender used in line of sight)     Precautions: Standard, post op  PROCEDURE PERFORMED:   Left ACL reconstruction with quadriceps tendon autograft   Left posterolateral corner reconstruction with allograft  Left knee lateral extra-articular tenodesis  Left knee arthroscopic medial meniscus repair   Left knee arthroscopic lateral meniscus repair   Left knee arthroscopic synovectomy   Left knee arthroscopic lysis of adhesions   Left knee open peroneal nerve neurolysis      SUBJECTIVE     Pt reports: had a good follow up with MD.     She was compliant with home exercise program.  Response to previous treatment: none  Functional change:  on going    Pain: 7/10  Location: left knee      OBJECTIVE   1/3/25    L knee ROM:  2-0-50 degrees    Treatment       Felicity received the treatments listed below:      Therapeutic exercises to develop strength, endurance, ROM, posture, and core stabilization for 60 minutes including:  Standing heel raises with TTWB LLE 3x10  Quad sets with strap x fatigue  ROM check-np  Knee flexion off EOT  Extension  hinge  Patellar/fat pad mobs  EOT knee flexion  NMES with QS 10 sec on/50 sec off x 15 min  Heel slide with QS (towel under knee) x 5 min  SLR with brace and strap 2x10  SL hip abd with brace 2x10  FOTO  PT reassessment, updated POC    *BILLED PER MEDICAID GUIDELINES*    Patient Education and Home Exercises     Home Exercises Provided and Patient Education Provided     Education provided:   - cont HEP, heel slides with brace donned    Written Home Exercises Provided: Patient instructed to cont prior HEP. Exercises were reviewed and Felicity was able to demonstrate them prior to the end of the session.  Felicity demonstrated good  understanding of the education provided. See EMR under Patient Instructions for exercises provided during therapy sessions    ASSESSMENT     Upon reassessment, pt is showing improved quad activation and good knee extension. Her flexion is slowly progressing per protocol and some muscle guarding.  Plan to progress per protocol and pt tolerance.     Felicity Is progressing well towards her goals.     Pt prognosis is Good.     Pt will continue to benefit from skilled outpatient physical therapy to address the deficits listed in the problem list box on initial evaluation, provide pt/family education and to maximize pt's level of independence in the home and community environment.     Pt's spiritual, cultural and educational needs considered and pt agreeable to plan of care and goals.     Anticipated barriers to physical therapy: none    Goals:   Short Term Goals: 6 weeks Post op  - Pt will increase ROM to +5 L knee hyperextension, 90 deg flex L knee (Progressing, not met)  - Decrease Pain to 3/10 as worst with all PT interventions (Progressing, not met)  - Pt to self correct posture and gait with no AD or brace and minimal cues (Progressing, not met)  - Pt independent with HEP with progressions. (Met)     Long Term Goals ( 1 year):(Progressing, not met)  - Pt will increase ROM to 0 deg L knee  hyperextension and 110 deg flexion  - Pt will increase strength to 5/5 L knee in all planes  - Decrease Pain to 0/10 as worst with stairs, walking and lifting weights  - Pt to return to 80% PLOF    PLAN     Plan of care Certification: 12/13/2024 to 12/31/25.     Outpatient Physical Therapy 2 times weekly for 52 weeks to include the following interventions: Gait Training, Manual Therapy, Moist Heat/ Ice, Neuromuscular Re-ed, Patient Education, Therapeutic Activities, and Therapeutic Exercise.    Deborah Coe, PT, DPT, OCS, COMT

## 2025-01-08 ENCOUNTER — CLINICAL SUPPORT (OUTPATIENT)
Dept: REHABILITATION | Facility: HOSPITAL | Age: 25
End: 2025-01-08
Payer: MEDICAID

## 2025-01-08 DIAGNOSIS — M25.562 ACUTE PAIN OF LEFT KNEE: Primary | ICD-10-CM

## 2025-01-08 PROCEDURE — 97110 THERAPEUTIC EXERCISES: CPT | Mod: CQ

## 2025-01-08 NOTE — PROGRESS NOTES
[facility]   Physical Therapy Treatment Note     Name: Felicity MENJIVAR Noel  [facility] Number: [id number]    Therapy Diagnosis:   Encounter Diagnosis   Name Primary?    Acute pain of left knee Yes     Physician: Jhony Berry MD    Visit Date: 1/8/2025    Physician Orders: PT Eval and Treat   Medical Diagnosis from Referral:   S83.512A (ICD-10-CM) - Rupture of anterior cruciate ligament of left knee, initial encounter   S83.422A (ICD-10-CM) - Tear of lateral collateral ligament of left knee, initial encounter   S83.8X2D (ICD-10-CM) - Acute medial meniscal injury of left knee, subsequent encounter      Evaluation Date: 10/24/2024  Re-evaluation date: 12/13/2024  Authorization Period Expiration: 12/31/25  Plan of Care Expiration: 12/31/25  Visit # / Visits authorized: 2/ 20   FOTO: 1/3     Time In: 2:35 am  Time Out: 3:30 pm  Total Billable Time: 55 minutes      Precautions: Standard, post op  PROCEDURE PERFORMED:   Left ACL reconstruction with quadriceps tendon autograft   Left posterolateral corner reconstruction with allograft  Left knee lateral extra-articular tenodesis  Left knee arthroscopic medial meniscus repair   Left knee arthroscopic lateral meniscus repair   Left knee arthroscopic synovectomy   Left knee arthroscopic lysis of adhesions   Left knee open peroneal nerve neurolysis      SUBJECTIVE     Pt reports: having trouble with flexion    She was compliant with home exercise program.  Response to previous treatment: none  Functional change:  on going    Pain: 7/10  Location: left knee      OBJECTIVE   1/825    L knee ROM:  2-0-55 degrees    Treatment       Felicity received the treatments listed below:      Therapeutic exercises to develop strength, endurance, ROM, posture, and core stabilization for 55 minutes including:  Scar tissue mobs  Quad sets with strap x fatigue  ROM check  Knee flexion off EOT  Patellar/fat pad mobs  EOT knee flexion manual   NMES with QS 10 sec on/50  sec off x 15 min  Heel slide with QS (towel under knee) x 5 min  SLR with brace and strap 2x10  SL hip abd with brace 2x10    PT reassessment,     *BILLED PER MEDICAID GUIDELINES*    Patient Education and Home Exercises     Home Exercises Provided and Patient Education Provided     Education provided:   - cont HEP, heel slides with brace donned    Written Home Exercises Provided: Patient instructed to cont prior HEP. Exercises were reviewed and Felicity was able to demonstrate them prior to the end of the session.  Felicity demonstrated good  understanding of the education provided. See EMR under Patient Instructions for exercises provided during therapy sessions    ASSESSMENT     Felicity started her session at 45 degrees of flexion. After manual off EOT we were able to achieve 55 degrees but with manual inferior patellar glide. ROM limited by pain that is not at joint line. These symptoms vary in placement. Still unable to perform SLR without assist. Educated pt on how to perform self knee flexion at home with strap and assist with contralateral foot. Fat pad and patellar restrictions are contributing to deficits in ROM as well. Worked scars to improve tissue mobility.     Felicity Is progressing well towards her goals.     Pt prognosis is Good.     Pt will continue to benefit from skilled outpatient physical therapy to address the deficits listed in the problem list box on initial evaluation, provide pt/family education and to maximize pt's level of independence in the home and community environment.     Pt's spiritual, cultural and educational needs considered and pt agreeable to plan of care and goals.     Anticipated barriers to physical therapy: none    Goals:   Short Term Goals: 6 weeks Post op  - Pt will increase ROM to +5 L knee hyperextension, 90 deg flex L knee (Progressing, not met)  - Decrease Pain to 3/10 as worst with all PT interventions (Progressing, not met)  - Pt to self correct posture and gait  with no AD or brace and minimal cues (Progressing, not met)  - Pt independent with HEP with progressions. (Met)     Long Term Goals ( 1 year):(Progressing, not met)  - Pt will increase ROM to 0 deg L knee hyperextension and 110 deg flexion  - Pt will increase strength to 5/5 L knee in all planes  - Decrease Pain to 0/10 as worst with stairs, walking and lifting weights  - Pt to return to 80% PLOF    PLAN     Plan of care Certification: 12/13/2024 to 12/31/25.     Outpatient Physical Therapy 2 times weekly for 52 weeks to include the following interventions: Gait Training, Manual Therapy, Moist Heat/ Ice, Neuromuscular Re-ed, Patient Education, Therapeutic Activities, and Therapeutic Exercise.    Edwina Kelly PTA,

## 2025-01-10 ENCOUNTER — CLINICAL SUPPORT (OUTPATIENT)
Dept: REHABILITATION | Facility: HOSPITAL | Age: 25
End: 2025-01-10
Payer: MEDICAID

## 2025-01-10 DIAGNOSIS — M25.562 ACUTE PAIN OF LEFT KNEE: Primary | ICD-10-CM

## 2025-01-10 PROCEDURE — 97110 THERAPEUTIC EXERCISES: CPT

## 2025-01-10 NOTE — PROGRESS NOTES
[facility]   Physical Therapy Treatment Note     Name: Felicity MENJIVAR Noel  [facility] Number: [id number]    Therapy Diagnosis:   Encounter Diagnosis   Name Primary?    Acute pain of left knee Yes     Physician: Jhony Berry MD    Visit Date: 1/10/2025    Physician Orders: PT Eval and Treat   Medical Diagnosis from Referral:   S83.512A (ICD-10-CM) - Rupture of anterior cruciate ligament of left knee, initial encounter   S83.422A (ICD-10-CM) - Tear of lateral collateral ligament of left knee, initial encounter   S83.8X2D (ICD-10-CM) - Acute medial meniscal injury of left knee, subsequent encounter      Evaluation Date: 10/24/2024  Re-evaluation date: 12/13/2024  Authorization Period Expiration: 12/31/25  Plan of Care Expiration: 12/31/25  Visit # / Visits authorized: 3/ 20   FOTO: 1/3     Time In: 2:00 pm  Time Out: 300 pm  Total Billable Time: 60 minutes      Precautions: Standard, post op  PROCEDURE PERFORMED:   Left ACL reconstruction with quadriceps tendon autograft   Left posterolateral corner reconstruction with allograft  Left knee lateral extra-articular tenodesis  Left knee arthroscopic medial meniscus repair   Left knee arthroscopic lateral meniscus repair   Left knee arthroscopic synovectomy   Left knee arthroscopic lysis of adhesions   Left knee open peroneal nerve neurolysis      SUBJECTIVE     Pt reports: has been working on flexion at home with STM, mobs and ROM off EOB.    She was compliant with home exercise program.  Response to previous treatment: none  Functional change:  on going    Pain: 7/10  Location: left knee      OBJECTIVE   1/10/25    L knee ROM:  2-0-65 degrees    Treatment       Felicity received the treatments listed below:      Therapeutic exercises to develop strength, endurance, ROM, posture, and core stabilization for 60 minutes including:  Scar tissue mobs  Ankle pumps btb x30  Quad sets with strap x fatigue  ROM check  Knee flexion off EOT  Patellar/fat  pad mobs  EOT knee flexion manual   NMES with QS 10 sec on/50 sec off x 20 min  Heel slide with QS (towel under knee) x 5 min  SLR with brace and strap 3x10  SL hip abd with brace 3x10      *BILLED PER MEDICAID GUIDELINES*    Patient Education and Home Exercises     Home Exercises Provided and Patient Education Provided     Education provided:   - cont HEP, heel slides with brace donned    Written Home Exercises Provided: Patient instructed to cont prior HEP. Exercises were reviewed and Felicity was able to demonstrate them prior to the end of the session.  Felicity demonstrated good  understanding of the education provided. See EMR under Patient Instructions for exercises provided during therapy sessions    ASSESSMENT     Felicity improved 10 deg of knee flexion this session from two days prior indicating good compliance with HEP and scar tissue management. Plan to continue progressing per protocol and pt tolerance.     Felicity Is progressing well towards her goals.     Pt prognosis is Good.     Pt will continue to benefit from skilled outpatient physical therapy to address the deficits listed in the problem list box on initial evaluation, provide pt/family education and to maximize pt's level of independence in the home and community environment.     Pt's spiritual, cultural and educational needs considered and pt agreeable to plan of care and goals.     Anticipated barriers to physical therapy: none    Goals:   Short Term Goals: 6 weeks Post op  - Pt will increase ROM to +5 L knee hyperextension, 90 deg flex L knee (Progressing, not met)  - Decrease Pain to 3/10 as worst with all PT interventions (Progressing, not met)  - Pt to self correct posture and gait with no AD or brace and minimal cues (Progressing, not met)  - Pt independent with HEP with progressions. (Met)     Long Term Goals ( 1 year):(Progressing, not met)  - Pt will increase ROM to 0 deg L knee hyperextension and 110 deg flexion  - Pt will increase  strength to 5/5 L knee in all planes  - Decrease Pain to 0/10 as worst with stairs, walking and lifting weights  - Pt to return to 80% PLOF    PLAN     Plan of care Certification: 12/13/2024 to 12/31/25.     Outpatient Physical Therapy 2 times weekly for 52 weeks to include the following interventions: Gait Training, Manual Therapy, Moist Heat/ Ice, Neuromuscular Re-ed, Patient Education, Therapeutic Activities, and Therapeutic Exercise.    Deborah Coe, PT,

## 2025-01-15 ENCOUNTER — TELEPHONE (OUTPATIENT)
Dept: SPORTS MEDICINE | Facility: CLINIC | Age: 25
End: 2025-01-15
Payer: MEDICAID

## 2025-01-15 NOTE — TELEPHONE ENCOUNTER
[facility] for pt in regards to her appt on 1/23, I let pt know Dr. Berry would not be starting clinic until about 10am. Saw she had physical therapy at that time, so I moved her appt to 11am which she can walk over to our office after. Asked her to call me back if that is an issue

## 2025-01-16 ENCOUNTER — CLINICAL SUPPORT (OUTPATIENT)
Dept: REHABILITATION | Facility: HOSPITAL | Age: 25
End: 2025-01-16
Payer: MEDICAID

## 2025-01-16 DIAGNOSIS — M25.562 ACUTE PAIN OF LEFT KNEE: Primary | ICD-10-CM

## 2025-01-16 PROCEDURE — 97110 THERAPEUTIC EXERCISES: CPT

## 2025-01-16 NOTE — PROGRESS NOTES
[facility]   Physical Therapy Treatment Note     Name: Felicity MENJIVAR Noel  [facility] Number: [id number]    Therapy Diagnosis:   Encounter Diagnosis   Name Primary?    Acute pain of left knee Yes     Physician: Jhony Berry MD    Visit Date: 1/16/2025    Physician Orders: PT Eval and Treat   Medical Diagnosis from Referral:   S83.512A (ICD-10-CM) - Rupture of anterior cruciate ligament of left knee, initial encounter   S83.422A (ICD-10-CM) - Tear of lateral collateral ligament of left knee, initial encounter   S83.8X2D (ICD-10-CM) - Acute medial meniscal injury of left knee, subsequent encounter      Evaluation Date: 10/24/2024  Re-evaluation date: 12/13/2024  Authorization Period Expiration: 12/31/25  Plan of Care Expiration: 12/31/25  Visit # / Visits authorized: 4/ 20   FOTO: 1/3     Time In: 2:00 pm  Time Out: 245 pm   Total Billable Time: 45 minutes      Precautions: Standard, post op  PROCEDURE PERFORMED:   Left ACL reconstruction with quadriceps tendon autograft   Left posterolateral corner reconstruction with allograft  Left knee lateral extra-articular tenodesis  Left knee arthroscopic medial meniscus repair   Left knee arthroscopic lateral meniscus repair   Left knee arthroscopic synovectomy   Left knee arthroscopic lysis of adhesions   Left knee open peroneal nerve neurolysis      SUBJECTIVE     Pt reports:her L knee is feeling more tight than normal and having a hard time going into flexion.     She was compliant with home exercise program.  Response to previous treatment: none  Functional change:  on going    Pain: 7/10  Location: left knee      OBJECTIVE   1/16/25    L knee ROM:  5-0-65 degrees    Treatment       Felicity received the treatments listed below:      Therapeutic exercises to develop strength, endurance, ROM, posture, and core stabilization for 45 minutes including:  Scar tissue mobs-np  Ankle pumps btb x30-np  Quad sets with strap x fatigue  ROM check  Knee  flexion off EOT  Patellar/fat pad mobs  EOT knee flexion manual   Seated knee flexion by PT with slide board  NMES with QS 10 sec on/50 sec off x 20 min  Heel slide with QS (towel under knee) x 5 min  SLR with brace and strap 3x10-np  SL hip abd with brace 3x10-np      *BILLED PER MEDICAID GUIDELINES*    Patient Education and Home Exercises     Home Exercises Provided and Patient Education Provided     Education provided:   - cont HEP, heel slides with brace donned    Written Home Exercises Provided: Patient instructed to cont prior HEP. Exercises were reviewed and Felicity was able to demonstrate them prior to the end of the session.  Felicity demonstrated good  understanding of the education provided. See EMR under Patient Instructions for exercises provided during therapy sessions    ASSESSMENT     Felicity was more limited this session with knee flexion, so session ended early to avoid flare up and since pt is returning to PT tomorrow. Will reassess knee flexion next session and progress as tolerated for 6 week lavonne next week. Plan to continue progressing per protocol and pt tolerance.     Felicity Is progressing well towards her goals.     Pt prognosis is Good.     Pt will continue to benefit from skilled outpatient physical therapy to address the deficits listed in the problem list box on initial evaluation, provide pt/family education and to maximize pt's level of independence in the home and community environment.     Pt's spiritual, cultural and educational needs considered and pt agreeable to plan of care and goals.     Anticipated barriers to physical therapy: none    Goals:   Short Term Goals: 6 weeks Post op  - Pt will increase ROM to +5 L knee hyperextension, 90 deg flex L knee (Progressing, not met)  - Decrease Pain to 3/10 as worst with all PT interventions (Progressing, not met)  - Pt to self correct posture and gait with no AD or brace and minimal cues (Progressing, not met)  - Pt independent with  HEP with progressions. (Met)     Long Term Goals ( 1 year):(Progressing, not met)  - Pt will increase ROM to 0 deg L knee hyperextension and 110 deg flexion  - Pt will increase strength to 5/5 L knee in all planes  - Decrease Pain to 0/10 as worst with stairs, walking and lifting weights  - Pt to return to 80% PLOF    PLAN     Plan of care Certification: 12/13/2024 to 12/31/25.     Outpatient Physical Therapy 2 times weekly for 52 weeks to include the following interventions: Gait Training, Manual Therapy, Moist Heat/ Ice, Neuromuscular Re-ed, Patient Education, Therapeutic Activities, and Therapeutic Exercise.    Deborah Coe, PT, DPT, OCS, COMT

## 2025-01-17 ENCOUNTER — CLINICAL SUPPORT (OUTPATIENT)
Dept: REHABILITATION | Facility: HOSPITAL | Age: 25
End: 2025-01-17
Payer: MEDICAID

## 2025-01-17 DIAGNOSIS — M25.562 ACUTE PAIN OF LEFT KNEE: Primary | ICD-10-CM

## 2025-01-17 PROCEDURE — 97110 THERAPEUTIC EXERCISES: CPT | Mod: CQ

## 2025-01-17 NOTE — PROGRESS NOTES
[facility]   Physical Therapy Treatment Note     Name: Felicity Cohen  [facility] Number: [id number]    Therapy Diagnosis:   Encounter Diagnosis   Name Primary?    Acute pain of left knee Yes     Physician: Jhony Berry MD    Visit Date: 1/17/2025    Physician Orders: PT Eval and Treat   Medical Diagnosis from Referral:   S83.512A (ICD-10-CM) - Rupture of anterior cruciate ligament of left knee, initial encounter   S83.422A (ICD-10-CM) - Tear of lateral collateral ligament of left knee, initial encounter   S83.8X2D (ICD-10-CM) - Acute medial meniscal injury of left knee, subsequent encounter      Evaluation Date: 10/24/2024  Re-evaluation date: 12/13/2024  Authorization Period Expiration: 12/31/25  Plan of Care Expiration: 12/31/25  Visit # / Visits authorized: 5/ 20   FOTO: 1/3     Time In: 0900 pm  Time Out: 1000 pm   Total Billable Time: 60 minutes      Precautions: Standard, post op  PROCEDURE PERFORMED:   Left ACL reconstruction with quadriceps tendon autograft   Left posterolateral corner reconstruction with allograft  Left knee lateral extra-articular tenodesis  Left knee arthroscopic medial meniscus repair   Left knee arthroscopic lateral meniscus repair   Left knee arthroscopic synovectomy   Left knee arthroscopic lysis of adhesions   Left knee open peroneal nerve neurolysis      SUBJECTIVE     Pt reports: took muscle relaxer to help     She was compliant with home exercise program.  Response to previous treatment: none  Functional change:  on going    Pain: 7/10  Location: left knee      OBJECTIVE   1/17/25    L knee ROM:  5-0-70 degrees    Treatment       Felicity received the treatments listed below:      Therapeutic exercises to develop strength, endurance, ROM, posture, and core stabilization for 60 minutes including:  Scar tissue mobs  Fibular head mobs  ROM check  Manual knee flexion off EOT  Self Knee flexion off EOT   Patellar/fat pad mobs  Quad sets with strap x  fatigue  NMES with QS 10 sec on/50 sec off x 20 min  Heel slide with QS (towel under knee) x 5 min  Knee flexion heel slide with EOT elevated x 3 min    SLR with brace and strap 3x10-np  SL hip abd with brace 3x10-np      *BILLED PER MEDICAID GUIDELINES*    Patient Education and Home Exercises     Home Exercises Provided and Patient Education Provided     Education provided:   - cont HEP, heel slides with brace donned    Written Home Exercises Provided: Patient instructed to cont prior HEP. Exercises were reviewed and Felicity was able to demonstrate them prior to the end of the session.  Felicity demonstrated good  understanding of the education provided. See EMR under Patient Instructions for exercises provided during therapy sessions    ASSESSMENT     Felicity still stiff into flexion with firm end feel. Was able to gain more degrees of flexion with heel slide in supine position. Continued with NMES to improve volitional quad activation.     Felicity Is progressing well towards her goals.     Pt prognosis is Good.     Pt will continue to benefit from skilled outpatient physical therapy to address the deficits listed in the problem list box on initial evaluation, provide pt/family education and to maximize pt's level of independence in the home and community environment.     Pt's spiritual, cultural and educational needs considered and pt agreeable to plan of care and goals.     Anticipated barriers to physical therapy: none    Goals:   Short Term Goals: 6 weeks Post op  - Pt will increase ROM to +5 L knee hyperextension, 90 deg flex L knee (Progressing, not met)  - Decrease Pain to 3/10 as worst with all PT interventions (Progressing, not met)  - Pt to self correct posture and gait with no AD or brace and minimal cues (Progressing, not met)  - Pt independent with HEP with progressions. (Met)     Long Term Goals ( 1 year):(Progressing, not met)  - Pt will increase ROM to 0 deg L knee hyperextension and 110 deg  flexion  - Pt will increase strength to 5/5 L knee in all planes  - Decrease Pain to 0/10 as worst with stairs, walking and lifting weights  - Pt to return to 80% PLOF    PLAN     Plan of care Certification: 12/13/2024 to 12/31/25.     Outpatient Physical Therapy 2 times weekly for 52 weeks to include the following interventions: Gait Training, Manual Therapy, Moist Heat/ Ice, Neuromuscular Re-ed, Patient Education, Therapeutic Activities, and Therapeutic Exercise.    Edwina Kelly PTA,

## 2025-01-22 ENCOUNTER — PATIENT MESSAGE (OUTPATIENT)
Dept: SPORTS MEDICINE | Facility: CLINIC | Age: 25
End: 2025-01-22
Payer: MEDICAID

## 2025-01-24 ENCOUNTER — CLINICAL SUPPORT (OUTPATIENT)
Dept: REHABILITATION | Facility: HOSPITAL | Age: 25
End: 2025-01-24
Payer: MEDICAID

## 2025-01-24 DIAGNOSIS — M25.562 ACUTE PAIN OF LEFT KNEE: Primary | ICD-10-CM

## 2025-01-24 PROCEDURE — 97110 THERAPEUTIC EXERCISES: CPT

## 2025-01-24 NOTE — PROGRESS NOTES
[facility]   Physical Therapy Treatment Note     Name: Felicity MENJIVAR Noel  [facility] Number: [id number]    Therapy Diagnosis:   Encounter Diagnosis   Name Primary?    Acute pain of left knee Yes     Physician: Jhony Berry MD    Visit Date: 1/24/2025    Physician Orders: PT Eval and Treat   Medical Diagnosis from Referral:   S83.512A (ICD-10-CM) - Rupture of anterior cruciate ligament of left knee, initial encounter   S83.422A (ICD-10-CM) - Tear of lateral collateral ligament of left knee, initial encounter   S83.8X2D (ICD-10-CM) - Acute medial meniscal injury of left knee, subsequent encounter      Evaluation Date: 10/24/2024  Re-evaluation date: 12/13/2024  Authorization Period Expiration: 12/31/25  Plan of Care Expiration: 12/31/25  Visit # / Visits authorized: 6/ 20   FOTO: 1/3     Time In: 1045 am  Time Out: 1150 am   Total Billable Time: 65 minutes      Precautions: Standard, post op  PROCEDURE PERFORMED:   Left ACL reconstruction with quadriceps tendon autograft   Left posterolateral corner reconstruction with allograft  Left knee lateral extra-articular tenodesis  Left knee arthroscopic medial meniscus repair   Left knee arthroscopic lateral meniscus repair   Left knee arthroscopic synovectomy   Left knee arthroscopic lysis of adhesions   Left knee open peroneal nerve neurolysis      SUBJECTIVE     Pt reports:she has been doing her exercises 2x a day, but still having a hard time with flexion progressing.     She was compliant with home exercise program.  Response to previous treatment: none  Functional change:  on going    Pain: not quantified/10  Location: left knee      OBJECTIVE   1/17/25    L knee ROM:  5-0-70 degrees    Treatment       Felicity received the treatments listed below:      Therapeutic exercises to develop strength, endurance, ROM, posture, and core stabilization for 65 minutes including:  Scar tissue mobs  Fibular head mobs  ROM check  Manual knee flexion off  EOT  Self Knee flexion off EOT   Patellar/fat pad mobs  Quad sets with strap x fatigue  NMES with QS 10 sec on/50 sec off x 20 min  Heel slide with QS (towel under knee) x 5 min  Knee flexion heel slide with EOT elevated x 3 min  Gait training 50% per protocol    SLR with brace and strap 3x10-np  SL hip abd with brace 3x10-np      *BILLED PER MEDICAID GUIDELINES*    Patient Education and Home Exercises     Home Exercises Provided and Patient Education Provided     Education provided:   - cont HEP, heel slides with brace donned    Written Home Exercises Provided: Patient instructed to cont prior HEP. Exercises were reviewed and Felicity was able to demonstrate them prior to the end of the session.  Felicity demonstrated good  understanding of the education provided. See EMR under Patient Instructions for exercises provided during therapy sessions    ASSESSMENT     Upon reassessment, pt still limited with knee flexion, but maintaining good knee extension. She demonstrated good understanding of 50% WB per protocol with brace locked in ext. Plan to progress with knee flexion and quad strengthening.     Felicity Is progressing well towards her goals.     Pt prognosis is Good.     Pt will continue to benefit from skilled outpatient physical therapy to address the deficits listed in the problem list box on initial evaluation, provide pt/family education and to maximize pt's level of independence in the home and community environment.     Pt's spiritual, cultural and educational needs considered and pt agreeable to plan of care and goals.     Anticipated barriers to physical therapy: none    Goals:   Short Term Goals: 6 weeks Post op  - Pt will increase ROM to +5 L knee hyperextension, 90 deg flex L knee (Progressing, not met)  - Decrease Pain to 3/10 as worst with all PT interventions (Progressing, not met)  - Pt to self correct posture and gait with no AD or brace and minimal cues (Progressing, not met)  - Pt independent  with HEP with progressions. (Met)     Long Term Goals ( 1 year):(Progressing, not met)  - Pt will increase ROM to 0 deg L knee hyperextension and 110 deg flexion  - Pt will increase strength to 5/5 L knee in all planes  - Decrease Pain to 0/10 as worst with stairs, walking and lifting weights  - Pt to return to 80% PLOF    PLAN     Plan of care Certification: 12/13/2024 to 12/31/25.     Outpatient Physical Therapy 2 times weekly for 52 weeks to include the following interventions: Gait Training, Manual Therapy, Moist Heat/ Ice, Neuromuscular Re-ed, Patient Education, Therapeutic Activities, and Therapeutic Exercise.    Deborah Coe, PT,

## 2025-01-25 ENCOUNTER — HOSPITAL ENCOUNTER (OUTPATIENT)
Dept: RADIOLOGY | Facility: HOSPITAL | Age: 25
Discharge: HOME | End: 2025-01-25
Attending: STUDENT IN AN ORGANIZED HEALTH CARE EDUCATION/TRAINING PROGRAM
Payer: MEDICAID

## 2025-01-25 ENCOUNTER — OFFICE VISIT (OUTPATIENT)
Dept: SPORTS MEDICINE | Facility: CLINIC | Age: 25
End: 2025-01-25
Payer: MEDICAID

## 2025-01-25 VITALS — DIASTOLIC BLOOD PRESSURE: 90 MMHG | SYSTOLIC BLOOD PRESSURE: 139 MMHG | HEART RATE: 108 BPM

## 2025-01-25 DIAGNOSIS — S83.512D RUPTURE OF ANTERIOR CRUCIATE LIGAMENT OF LEFT KNEE, SUBSEQUENT ENCOUNTER: ICD-10-CM

## 2025-01-25 DIAGNOSIS — M25.562 ACUTE PAIN OF LEFT KNEE: Primary | ICD-10-CM

## 2025-01-25 DIAGNOSIS — M25.562 ACUTE PAIN OF LEFT KNEE: ICD-10-CM

## 2025-01-25 DIAGNOSIS — S89.92XD INJURY OF POSTEROLATERAL CORNER OF LEFT KNEE, SUBSEQUENT ENCOUNTER: ICD-10-CM

## 2025-01-25 PROCEDURE — 3080F DIAST BP >= 90 MM HG: CPT | Mod: CPTII,,, | Performed by: STUDENT IN AN ORGANIZED HEALTH CARE EDUCATION/TRAINING PROGRAM

## 2025-01-25 PROCEDURE — 99213 OFFICE O/P EST LOW 20 MIN: CPT | Mod: PBBFAC,25 | Performed by: STUDENT IN AN ORGANIZED HEALTH CARE EDUCATION/TRAINING PROGRAM

## 2025-01-25 PROCEDURE — 1159F MED LIST DOCD IN RCRD: CPT | Mod: CPTII,,, | Performed by: STUDENT IN AN ORGANIZED HEALTH CARE EDUCATION/TRAINING PROGRAM

## 2025-01-25 PROCEDURE — 1160F RVW MEDS BY RX/DR IN RCRD: CPT | Mod: CPTII,,, | Performed by: STUDENT IN AN ORGANIZED HEALTH CARE EDUCATION/TRAINING PROGRAM

## 2025-01-25 PROCEDURE — 73560 X-RAY EXAM OF KNEE 1 OR 2: CPT | Mod: 26,LT,, | Performed by: RADIOLOGY

## 2025-01-25 PROCEDURE — 3075F SYST BP GE 130 - 139MM HG: CPT | Mod: CPTII,,, | Performed by: STUDENT IN AN ORGANIZED HEALTH CARE EDUCATION/TRAINING PROGRAM

## 2025-01-25 PROCEDURE — 73560 X-RAY EXAM OF KNEE 1 OR 2: CPT | Mod: TC,LT

## 2025-01-25 PROCEDURE — 99024 POSTOP FOLLOW-UP VISIT: CPT | Mod: ,,, | Performed by: STUDENT IN AN ORGANIZED HEALTH CARE EDUCATION/TRAINING PROGRAM

## 2025-01-25 PROCEDURE — 99999 PR PBB SHADOW E&M-EST. PATIENT-LVL III: CPT | Mod: PBBFAC,,, | Performed by: STUDENT IN AN ORGANIZED HEALTH CARE EDUCATION/TRAINING PROGRAM

## 2025-01-25 NOTE — PROGRESS NOTES
Subjective:     Chief Complaint: Felicity Cohen is a 24 y.o. female who had concerns including Post-op Evaluation of the Left Knee.    HPI    History of Present Illness    CHIEF COMPLAINT:  - Multiple ligament repair follow-up    HPI:  Client presents for a 6-week post-operative follow-up after knee surgery involving multiple ligament repairs, including ACL reconstruction and work on the posterior lateral corner. She reports overall improvement in her knee's condition, noting it feels different. She has been following post-operative protocols, including wearing a knee brace consistently, even during sleep. Client began 50% weight-bearing yesterday as instructed. She has been cautious during recent snowfall, with family members discouraging crutch use outdoors.    She has been attending physical therapy sessions, working on regaining range of motion and strength. Client expresses some impatience with the recovery timeline, inquiring about the possibility of returning to school in August, approximately 7-8 months post-surgery. She asks about applying force during knee bending exercises, indicating she has been performing these independently without assistance from physical therapists.    Client denies any significant stiffness in the knee that would require immediate intervention.    PREVIOUS TREATMENTS:  - Client has been using crutches and wearing a knee brace, including while sleeping, for the past 6 weeks.  - Client started physical therapy, which has been ongoing.  - Client began putting 50% weight on the affected leg as of yesterday.    WORK STATUS:  - Student, inquiring about returning to school in August (7-8 months post-surgery)         PROCEDURE PERFORMED by Jhony Berry MD on 12/12/2024:   Left ACL reconstruction with quadriceps tendon autograft   Left posterolateral corner reconstruction with allograft  Left knee lateral extra-articular tenodesis  Left knee arthroscopic medial meniscus repair   Left  knee arthroscopic lateral meniscus repair   Left knee arthroscopic synovectomy   Left knee arthroscopic lysis of adhesions   Left knee open peroneal nerve neurolysis    Past Medical History:   Diagnosis Date    ADHD (attention deficit hyperactivity disorder)     Anxiety     Constipation     Depression     GERD (gastroesophageal reflux disease)     IBS (irritable bowel syndrome)     Ovarian cyst     Schizophrenia in children        Current Outpatient Medications on File Prior to Visit   Medication Sig Dispense Refill    acetaminophen (TYLENOL) 325 MG tablet Take 325 mg by mouth every 6 (six) hours as needed.      aspirin (ECOTRIN) 81 MG EC tablet Take 1 tablet (81 mg total) by mouth once daily. 42 tablet 0    biotin 1 mg tablet Take 1,000 mcg by mouth twice a week. (Patient not taking: Reported on 12/27/2024)      butalbital-acetaminophen-caff -40 mg Cap Take 1 capsule by mouth daily as needed.      celecoxib (CELEBREX) 200 MG capsule Take 1 capsule (200 mg total) by mouth 2 (two) times daily with meals. 60 capsule 0    cetirizine (ZYRTEC) 10 MG tablet Take 10 mg by mouth once daily.      ergocalciferol (ERGOCALCIFEROL) 50,000 unit Cap TAKE ONE CAPSULE BY MOUTH EVERY 7 DAYS (Patient not taking: Reported on 12/27/2024) 12 capsule 3    ergocalciferol (ERGOCALCIFEROL) 50,000 unit Cap Take 1 capsule (50,000 Units total) by mouth every 7 days. (Patient not taking: Reported on 12/27/2024) 12 capsule 3    Lactobacillus rhamnosus GG (CULTURELLE) 10 billion cell capsule Take 1 capsule by mouth once daily. (Patient not taking: Reported on 12/27/2024)      methocarbamoL (ROBAXIN) 500 MG Tab Take 1 tablet (500 mg total) by mouth 3 (three) times daily as needed (muscle spasms). 30 tablet 0    mupirocin (BACTROBAN) 2 % ointment Apply topically 3 (three) times daily. (Patient not taking: Reported on 12/27/2024) 15 g 0    omega-3/dha/epa/ala/vitamin D3 (OMEGA-3-DHA-EPA-ALA-VIT D3 ORAL) Take by mouth.      oxyCODONE  (ROXICODONE) 5 MG immediate release tablet Take 1 tablet (5 mg total) by mouth every 6 (six) hours as needed for Pain. 21 tablet 0    polyethylene glycol (GLYCOLAX) 17 gram/dose powder Take 17 g by mouth once daily. (Patient not taking: Reported on 12/27/2024) 510 g 11    promethazine (PHENERGAN) 25 MG tablet Take 1 tablet (25 mg total) by mouth every 6 (six) hours as needed for Nausea. 30 tablet 0    triamcinolone acetonide 0.1% (KENALOG) 0.1 % ointment Apply topically 2 (two) times daily. To affected area (Patient not taking: Reported on 12/27/2024) 80 g 0     No current facility-administered medications on file prior to visit.       Past Surgical History:   Procedure Laterality Date    ARTHROSCOPY,KNEE,WITH MENISCUS REPAIR Left 12/12/2024    Procedure: ARTHROSCOPY,KNEE,WITH MENISCUS REPAIR;  Surgeon: Jhony Berry MD;  Location: [address];  Service: Orthopedics;  Laterality: Left;    DECOMPRESSION OF NERVE Left 12/12/2024    Procedure: DECOMPRESSION, NERVE;  Surgeon: Jhony Berry MD;  Location: [address];  Service: Orthopedics;  Laterality: Left;    ESOPHAGOGASTRODUODENOSCOPY N/A 2/8/2022    Procedure: EGD (ESOPHAGOGASTRODUODENOSCOPY);  Surgeon: Berkley Mckenna MD;  Location: [facility];  Service: Endoscopy;  Laterality: N/A;    RECONSTRUCTION OF ANTERIOR CRUCIATE LIGAMENT USING GRAFT Left 12/12/2024    Procedure: RECONSTRUCTION, KNEE, ACL, QUAD TENDON AUTOGRAFT;  Surgeon: Jhony Berry MD;  Location: [address];  Service: Orthopedics;  Laterality: Left;    RECONSTRUCTION OF LIGAMENT Left 12/12/2024    Procedure: RECONSTRUCTION, POSTEROLATERAL CORNER (PLC) WITH ALLOGRAFT;  Surgeon: Jhony Berry MD;  Location: [address];  Service: Orthopedics;  Laterality: Left;    RECONSTRUCTION, LIGAMENT, KNEE, EXTRA-ARTICULAR Left 12/12/2024    Procedure: RECONSTRUCTION, LIGAMENT, KNEE, EXTRA-ARTICULAR;  Surgeon: Jhony Berry MD;  Location: [address];  Service:  Orthopedics;  Laterality: Left;    SYNOVECTOMY, KNEE, LIMITED, ARTHROSCOPIC Left 12/12/2024    Procedure: SYNOVECTOMY, KNEE, LIMITED, ARTHROSCOPIC;  Surgeon: Jhony Berry MD;  Location: [facility] OR [address] [place] [address];  Service: Orthopedics;  Laterality: Left;    WISDOM TOOTH EXTRACTION         Family History   Problem Relation Name Age of Onset    Asthma Mother      Diabetes Father         Social History     Socioeconomic History    Marital status: Single   Tobacco Use    Smoking status: Never     Passive exposure: Yes    Smokeless tobacco: Never   Substance and Sexual Activity    Alcohol use: No    Drug use: Never    Sexual activity: Yes     Partners: Male       Review of Systems   Constitutional: Negative.   HENT: Negative.     Eyes: Negative.    Cardiovascular: Negative.    Respiratory: Negative.     Endocrine: Negative.    Hematologic/Lymphatic: Negative.    Skin: Negative.    Musculoskeletal:  Positive for joint pain, muscle weakness and stiffness. Negative for falls, joint swelling and myalgias.   Neurological: Negative.    Psychiatric/Behavioral: Negative.     Allergic/Immunologic: Negative.                  Objective:     General: Felicity MENJIVAR is well-developed, well-nourished, appears stated age, in no acute distress, alert and oriented to time, place and person.     General    Nursing note and vitals reviewed.  Constitutional: She is oriented to person, place, and time. She appears well-developed and well-nourished. No distress.   HENT:   Head: Normocephalic and atraumatic.   Nose: Nose normal.   Eyes: EOM are normal.   Cardiovascular:  Intact distal pulses.            Pulmonary/Chest: Effort normal. No respiratory distress.   Neurological: She is alert and oriented to person, place, and time.   Psychiatric: She has a normal mood and affect. Her behavior is normal. Judgment and thought content normal.     General Musculoskeletal Exam   Gait: abnormal         Left Knee Exam     Inspection   Erythema: absent  Scars:  present  Swelling: absent  Effusion: absent  Deformity: absent  Bruising: absent    Range of Motion   Extension:  0   Flexion:  70     Tests   Stability   Lachman: normal (-1 to 2mm)   PCL-Posterior Drawer: normal (0 to 2mm)  MCL - Valgus: normal (0 to 2mm)  LCL - Varus: normal (0 to 2mm)    Other   Sensation: normal    Comments:  Incision sites healing well, without signs of erythema, infection, or wound dehiscence      Muscle Strength   Left Lower Extremity   Quadriceps:  4/5   Hamstrin/5     Vascular Exam       Left Pulses  Dorsalis Pedis:      2+  Posterior Tibial:      2+        Edema  Left Lower Leg: absent      Imaging:  X-rays of the left knee from 2025 personally viewed by me on that day these include nonweightbearing AP and lateral.  Evidence of multi ligament knee reconstruction.  No noted complications.    Assessment:     Encounter Diagnoses   Name Primary?    Acute pain of left knee Yes    Rupture of anterior cruciate ligament of left knee, subsequent encounter     Injury of posterolateral corner of left knee, subsequent encounter         Plan:     Assessment & Plan    PROCEDURES:  - Potential future procedure to excise scar tissue if knee motion remains limited to restore full range of motion.  - No immediate plans; would only be considered if progress stalls in next 6-10 weeks.    FOLLOW UP:  - Follow up in 4-5 weeks.    PATIENT INSTRUCTIONS:  - Unlock brace at all times when wearing it.  - Remove brace when sleeping.  - Use brace when waking up and moving around.  - Progress to full weight-bearing next week.  - Walk with brace unlocked and straight when moving around.  - Can bend knee past 90 degrees as tolerated.  - Use belt or rope around ankle to assist with knee flexion exercises, applying gentle pressure to achieve stretch.  - Hold stretch position briefly, then relax for 1-2 minutes before repeating.         This note was generated with the assistance of ambient listening technology.  Verbal consent was obtained by the patient and accompanying visitor(s) for the recording of patient appointment to facilitate this note. I attest to having reviewed and edited the generated note for accuracy, though some syntax or spelling errors may persist. Please contact the author of this note for any clarification.      All of the patient's questions were answered. Patient was advised to call the clinic or contact me through the patient portal for any questions or concerns.         Patient questionnaires may have been collected.

## 2025-01-28 ENCOUNTER — CLINICAL SUPPORT (OUTPATIENT)
Dept: REHABILITATION | Facility: HOSPITAL | Age: 25
End: 2025-01-28
Payer: MEDICAID

## 2025-01-28 DIAGNOSIS — M25.562 ACUTE PAIN OF LEFT KNEE: Primary | ICD-10-CM

## 2025-01-28 PROCEDURE — 97110 THERAPEUTIC EXERCISES: CPT | Mod: CQ

## 2025-01-28 NOTE — PROGRESS NOTES
[facility]   Physical Therapy Treatment Note     Name: Felicity Cohen  Clinic Number: [id number]    Therapy Diagnosis:   Encounter Diagnosis   Name Primary?    Acute pain of left knee Yes     Physician: Jhony Berry MD    Visit Date: 1/28/2025    Physician Orders: PT Eval and Treat   Medical Diagnosis from Referral:   S83.512A (ICD-10-CM) - Rupture of anterior cruciate ligament of left knee, initial encounter   S83.422A (ICD-10-CM) - Tear of lateral collateral ligament of left knee, initial encounter   S83.8X2D (ICD-10-CM) - Acute medial meniscal injury of left knee, subsequent encounter      Evaluation Date: 10/24/2024  Re-evaluation date: 12/13/2024  Authorization Period Expiration: 12/31/25  Plan of Care Expiration: 12/31/25  Visit # / Visits authorized: 7/ 20   FOTO: 1/3     Time In: 11:10 am  Time Out: 12:00 am   Total Billable Time: 50 minutes      Precautions: Standard, post op  PROCEDURE PERFORMED:   Left ACL reconstruction with quadriceps tendon autograft   Left posterolateral corner reconstruction with allograft  Left knee lateral extra-articular tenodesis  Left knee arthroscopic medial meniscus repair   Left knee arthroscopic lateral meniscus repair   Left knee arthroscopic synovectomy   Left knee arthroscopic lysis of adhesions   Left knee open peroneal nerve neurolysis      SUBJECTIVE     Pt reports: MD visit went well. He unlocked brace. Doing well with putting weight through L LE. Have some foot tingle in the middle of my foot. More aggressive with flexion at home  She was compliant with home exercise program.  Response to previous treatment: none  Functional change:  on going    Pain: not quantified/10  Location: left knee      OBJECTIVE   1/17/25    L knee ROM:  5-0-75 degrees    Treatment       Felicity received the treatments listed below:      Therapeutic exercises to develop strength, endurance, ROM, posture, and core stabilization for 50 minutes  "including:  Scar tissue mobs  ROM check  Patellar/fat pad mobs multi angle  Quad sets with strap x fatigue  NMES with LAQ iso 10 sec on/50 sec off x 10 min  Heel slide with QS 30x  Knee flexion heel slide with EOT elevated x 5 min  Prone quad stretch 10x10" hold  Shuttle DL #[phone] 3x10        *BILLED PER MEDICAID GUIDELINES*    Patient Education and Home Exercises     Home Exercises Provided and Patient Education Provided     Education provided:   - cont HEP, heel slides with brace donned    Written Home Exercises Provided: Patient instructed to cont prior HEP. Exercises were reviewed and Felicity was able to demonstrate them prior to the end of the session.  Felicity demonstrated good  understanding of the education provided. See EMR under Patient Instructions for exercises provided during therapy sessions    ASSESSMENT     Felicity improved her flexion by 5 degrees today. Per MD note she is able to unlock brace with walking and able to progress to FWB. Pt is unlocked with B axillary crutches. Fair quad activation. Still unable to perform SLR without lag. Introduced shuttle today to promote flexion and work on LE strengthening. Cueing needed during     Felicity Is progressing well towards her goals.     Pt prognosis is Good.     Pt will continue to benefit from skilled outpatient physical therapy to address the deficits listed in the problem list box on initial evaluation, provide pt/family education and to maximize pt's level of independence in the home and community environment.     Pt's spiritual, cultural and educational needs considered and pt agreeable to plan of care and goals.     Anticipated barriers to physical therapy: none    Goals:   Short Term Goals: 6 weeks Post op  - Pt will increase ROM to +5 L knee hyperextension, 90 deg flex L knee (Progressing, not met)  - Decrease Pain to 3/10 as worst with all PT interventions (Progressing, not met)  - Pt to self correct posture and gait with no AD or brace and " minimal cues (Progressing, not met)  - Pt independent with HEP with progressions. (Met)     Long Term Goals ( 1 year):(Progressing, not met)  - Pt will increase ROM to 0 deg L knee hyperextension and 110 deg flexion  - Pt will increase strength to 5/5 L knee in all planes  - Decrease Pain to 0/10 as worst with stairs, walking and lifting weights  - Pt to return to 80% PLOF    PLAN     Plan of care Certification: 12/13/2024 to 12/31/25.     Outpatient Physical Therapy 2 times weekly for 52 weeks to include the following interventions: Gait Training, Manual Therapy, Moist Heat/ Ice, Neuromuscular Re-ed, Patient Education, Therapeutic Activities, and Therapeutic Exercise.    Edwina Kelly PTA,

## 2025-01-29 ENCOUNTER — CLINICAL SUPPORT (OUTPATIENT)
Dept: REHABILITATION | Facility: HOSPITAL | Age: 25
End: 2025-01-29
Payer: MEDICAID

## 2025-01-29 DIAGNOSIS — M25.562 ACUTE PAIN OF LEFT KNEE: Primary | ICD-10-CM

## 2025-01-29 PROCEDURE — 97110 THERAPEUTIC EXERCISES: CPT

## 2025-01-29 NOTE — PROGRESS NOTES
[facility]   Physical Therapy Treatment Note     Name: Felicity MENJIVAR Noel  [facility] Number: [id number]    Therapy Diagnosis:   Encounter Diagnosis   Name Primary?    Acute pain of left knee Yes     Physician: Jhony Berry MD    Visit Date: 1/29/2025    Physician Orders: PT Eval and Treat   Medical Diagnosis from Referral:   S83.512A (ICD-10-CM) - Rupture of anterior cruciate ligament of left knee, initial encounter   S83.422A (ICD-10-CM) - Tear of lateral collateral ligament of left knee, initial encounter   S83.8X2D (ICD-10-CM) - Acute medial meniscal injury of left knee, subsequent encounter      Evaluation Date: 10/24/2024  Re-evaluation date: 12/13/2024  Authorization Period Expiration: 12/31/25  Plan of Care Expiration: 12/31/25  Visit # / Visits authorized: 8/ 20   FOTO: 1/3     Time In: 220 pm  Time Out: 325 pm   Total Billable Time: 65 minutes      Precautions: Standard, post op  PROCEDURE PERFORMED:   Left ACL reconstruction with quadriceps tendon autograft   Left posterolateral corner reconstruction with allograft  Left knee lateral extra-articular tenodesis  Left knee arthroscopic medial meniscus repair   Left knee arthroscopic lateral meniscus repair   Left knee arthroscopic synovectomy   Left knee arthroscopic lysis of adhesions   Left knee open peroneal nerve neurolysis      SUBJECTIVE     Pt reports: no issues after last session, still a little nervous putting more WB through LLE.   She was compliant with home exercise program.  Response to previous treatment: none  Functional change:  on going    Pain: not quantified/10  Location: left knee      OBJECTIVE   1/29/25    L knee ROM:  5-0-80 degrees    Treatment       Felicity received the treatments listed below:      Therapeutic exercises to develop strength, endurance, ROM, posture, and core stabilization for 65 minutes including:  Bike x10 min available ROM  Scar tissue mobs  ROM check  Patellar/fat pad mobs multi  "angle  Quad sets with strap x fatigue-np  NMES with LAQ iso 10 sec on/30 sec off x 20 min  Heel slide with QS x5 min  Knee flexion heel slide with EOT elevated x 5 min  Knee flex off EOT with uninvolved leg assist  Prone quad stretch 10x10" hold-np  Shuttle DL #75 3x10-np    *BILLED PER MEDICAID GUIDELINES*      Patient Education and Home Exercises     Home Exercises Provided and Patient Education Provided     Education provided:   - cont HEP, heel slides with brace donned    Written Home Exercises Provided: Patient instructed to cont prior HEP. Exercises were reviewed and Felicity was able to demonstrate them prior to the end of the session.  Felicity demonstrated good  understanding of the education provided. See EMR under Patient Instructions for exercises provided during therapy sessions    ASSESSMENT     Felicity continued to improve knee flexion and is maintaining her quad extension. She responded well to bike and was educated on progressing to full WB next week per MD note.    Felicity Is progressing well towards her goals.     Pt prognosis is Good.     Pt will continue to benefit from skilled outpatient physical therapy to address the deficits listed in the problem list box on initial evaluation, provide pt/family education and to maximize pt's level of independence in the home and community environment.     Pt's spiritual, cultural and educational needs considered and pt agreeable to plan of care and goals.     Anticipated barriers to physical therapy: none    Goals:   Short Term Goals: 6 weeks Post op  - Pt will increase ROM to +5 L knee hyperextension, 90 deg flex L knee (Progressing, not met)  - Decrease Pain to 3/10 as worst with all PT interventions (Progressing, not met)  - Pt to self correct posture and gait with no AD or brace and minimal cues (Progressing, not met)  - Pt independent with HEP with progressions. (Met)     Long Term Goals ( 1 year):(Progressing, not met)  - Pt will increase ROM to 0 " deg L knee hyperextension and 110 deg flexion  - Pt will increase strength to 5/5 L knee in all planes  - Decrease Pain to 0/10 as worst with stairs, walking and lifting weights  - Pt to return to 80% PLOF    PLAN     Plan of care Certification: 12/13/2024 to 12/31/25.     Outpatient Physical Therapy 2 times weekly for 52 weeks to include the following interventions: Gait Training, Manual Therapy, Moist Heat/ Ice, Neuromuscular Re-ed, Patient Education, Therapeutic Activities, and Therapeutic Exercise.    Deborah Coe, PT,

## 2025-02-07 ENCOUNTER — CLINICAL SUPPORT (OUTPATIENT)
Dept: REHABILITATION | Facility: HOSPITAL | Age: 25
End: 2025-02-07
Payer: MEDICAID

## 2025-02-07 DIAGNOSIS — M25.562 ACUTE PAIN OF LEFT KNEE: Primary | ICD-10-CM

## 2025-02-07 PROCEDURE — 97110 THERAPEUTIC EXERCISES: CPT

## 2025-02-07 NOTE — PROGRESS NOTES
[facility]   Physical Therapy Treatment Note     Name: Felicity Cohen  Clinic Number: [id number]    Therapy Diagnosis:   Encounter Diagnosis   Name Primary?    Acute pain of left knee Yes     Physician: Jhony Berry MD    Visit Date: 2/7/2025    Physician Orders: PT Eval and Treat   Medical Diagnosis from Referral:   S83.512A (ICD-10-CM) - Rupture of anterior cruciate ligament of left knee, initial encounter   S83.422A (ICD-10-CM) - Tear of lateral collateral ligament of left knee, initial encounter   S83.8X2D (ICD-10-CM) - Acute medial meniscal injury of left knee, subsequent encounter      Evaluation Date: 10/24/2024  Re-evaluation date: 12/13/2024  Authorization Period Expiration: 12/31/25  Plan of Care Expiration: 12/31/25  Visit # / Visits authorized: 9/ 20   FOTO: 1/3     Time In: 200 pm  Time Out: 300 pm   Total Billable Time: 60 minutes (PT extender used in line of sight)     Precautions: Standard, post op  PROCEDURE PERFORMED:   Left ACL reconstruction with quadriceps tendon autograft   Left posterolateral corner reconstruction with allograft  Left knee lateral extra-articular tenodesis  Left knee arthroscopic medial meniscus repair   Left knee arthroscopic lateral meniscus repair   Left knee arthroscopic synovectomy   Left knee arthroscopic lysis of adhesions   Left knee open peroneal nerve neurolysis      SUBJECTIVE     Pt reports: noticing some posterior knee pain.   She was compliant with home exercise program.  Response to previous treatment: none  Functional change:  on going    Pain: not quantified/10  Location: left knee      OBJECTIVE   2/7/25    L knee ROM:  5-0-85 degrees    Treatment       Felicity received the treatments listed below:      Therapeutic exercises to develop strength, endurance, ROM, posture, and core stabilization for 60 minutes including:  Bike x10 min available ROM  Scar tissue mobs  ROM check  Patellar/fat pad mobs multi angle-np  Quad sets  "with strap x fatigue-np  NMES with LAQ iso 10 sec on/30 sec off x 20 min  Heel slide with QS x5 min-np  Knee flexion heel slide with EOT elevated x 5 min-np  Knee flex off EOT with uninvolved leg assist  Heel raises x20  Mini squats x20  Prone quad stretch 10x10" hold-np  Shuttle DL #75 3x10-np  Pt education and updated HEP x10 min    *BILLED PER MEDICAID GUIDELINES*      Patient Education and Home Exercises     Home Exercises Provided and Patient Education Provided     Education provided:   - cont HEP, heel slides with brace donned    Written Home Exercises Provided: Patient instructed to cont prior HEP. Exercises were reviewed and Felicity was able to demonstrate them prior to the end of the session.  Felicity demonstrated good  understanding of the education provided. See EMR under Patient Instructions for exercises provided during therapy sessions    ASSESSMENT     Felicity educated on full WB with crutches and how to progress to a single crutch. Pt continues to slowly progress knee flexion and responded to new standing interventions well. Plan to progress as tolerated.     Felicity Is progressing well towards her goals.     Pt prognosis is Good.     Pt will continue to benefit from skilled outpatient physical therapy to address the deficits listed in the problem list box on initial evaluation, provide pt/family education and to maximize pt's level of independence in the home and community environment.     Pt's spiritual, cultural and educational needs considered and pt agreeable to plan of care and goals.     Anticipated barriers to physical therapy: none    Goals:   Short Term Goals: 6 weeks Post op  - Pt will increase ROM to +5 L knee hyperextension, 90 deg flex L knee (Progressing, not met)  - Decrease Pain to 3/10 as worst with all PT interventions (Progressing, not met)  - Pt to self correct posture and gait with no AD or brace and minimal cues (Progressing, not met)  - Pt independent with HEP with " progressions. (Met)     Long Term Goals ( 1 year):(Progressing, not met)  - Pt will increase ROM to 0 deg L knee hyperextension and 110 deg flexion  - Pt will increase strength to 5/5 L knee in all planes  - Decrease Pain to 0/10 as worst with stairs, walking and lifting weights  - Pt to return to 80% PLOF    PLAN     Plan of care Certification: 12/13/2024 to 12/31/25.     Outpatient Physical Therapy 2 times weekly for 52 weeks to include the following interventions: Gait Training, Manual Therapy, Moist Heat/ Ice, Neuromuscular Re-ed, Patient Education, Therapeutic Activities, and Therapeutic Exercise.    Deborah Coe, PT,

## 2025-02-12 ENCOUNTER — CLINICAL SUPPORT (OUTPATIENT)
Dept: REHABILITATION | Facility: HOSPITAL | Age: 25
End: 2025-02-12
Payer: MEDICAID

## 2025-02-12 DIAGNOSIS — M25.562 ACUTE PAIN OF LEFT KNEE: Primary | ICD-10-CM

## 2025-02-12 PROCEDURE — 97110 THERAPEUTIC EXERCISES: CPT | Mod: CQ

## 2025-02-12 NOTE — PROGRESS NOTES
[facility]   Physical Therapy Treatment Note     Name: Felicity Cohen  Clinic Number: [id number]    Therapy Diagnosis:   Encounter Diagnosis   Name Primary?    Acute pain of left knee Yes       Physician: Jhony Berry MD    Visit Date: 2/12/2025    Physician Orders: PT Eval and Treat   Medical Diagnosis from Referral:   S83.512A (ICD-10-CM) - Rupture of anterior cruciate ligament of left knee, initial encounter   S83.422A (ICD-10-CM) - Tear of lateral collateral ligament of left knee, initial encounter   S83.8X2D (ICD-10-CM) - Acute medial meniscal injury of left knee, subsequent encounter      Evaluation Date: 10/24/2024  Re-evaluation date: 12/13/2024  Authorization Period Expiration: 12/31/25  Plan of Care Expiration: 12/31/25  Visit # / Visits authorized: 10/ 20   FOTO: 1/3     Time In: 200 pm  Time Out: 300 pm   Total Billable Time: 60 minutes (PT extender used in line of sight)     Precautions: Standard, post op  PROCEDURE PERFORMED:   Left ACL reconstruction with quadriceps tendon autograft   Left posterolateral corner reconstruction with allograft  Left knee lateral extra-articular tenodesis  Left knee arthroscopic medial meniscus repair   Left knee arthroscopic lateral meniscus repair   Left knee arthroscopic synovectomy   Left knee arthroscopic lysis of adhesions   Left knee open peroneal nerve neurolysis      SUBJECTIVE     Pt reports:sometimes get posterior or lateral knee pain  She was compliant with home exercise program.  Response to previous treatment: none  Functional change:  on going    Pain: not quantified/10  Location: left knee      OBJECTIVE   2/7/25    L knee ROM:  5-0-90 degrees    Treatment       Felicity received the treatments listed below:      Therapeutic exercises to develop strength, endurance, ROM, posture, and core stabilization for 60 minutes including:  Bike x10 min available ROM  Scar tissue mobs  ROM check  Patellar/fat pad mobs multi  "angle-  NMES with LAQ iso 10 sec on/30 sec off x 15 min  Knee flex off EOT with uninvolved leg assist  Heel raises x20  Shuttle SL #75 3x10  SLR EOT       Np:  Mini squats x20-np  Prone quad stretch 10x10" hold-np  Quad sets with strap x fatigue-np  Knee flexion heel slide with EOT elevated x 5 min-np  Heel slide with QS x5 min-np    *BILLED PER MEDICAID GUIDELINES*      Patient Education and Home Exercises     Home Exercises Provided and Patient Education Provided     Education provided:   - cont HEP, heel slides with brace donned    Written Home Exercises Provided: Patient instructed to cont prior HEP. Exercises were reviewed and Felicity was able to demonstrate them prior to the end of the session.  Felicity demonstrated good  understanding of the education provided. See EMR under Patient Instructions for exercises provided during therapy sessions    ASSESSMENT     Felicity started her session with 75 degrees of flexion. Was able to achieve 90 degrees after performing the bike. Was able to perform full revolutions retro but only once fwd. Quad is still weak. Unable to actively hyperextend her knee. Patella and fat pad restrictions noted with manual therapy.    Felicity Is progressing well towards her goals.     Pt prognosis is Good.     Pt will continue to benefit from skilled outpatient physical therapy to address the deficits listed in the problem list box on initial evaluation, provide pt/family education and to maximize pt's level of independence in the home and community environment.     Pt's spiritual, cultural and educational needs considered and pt agreeable to plan of care and goals.     Anticipated barriers to physical therapy: none    Goals:   Short Term Goals: 6 weeks Post op  - Pt will increase ROM to +5 L knee hyperextension, 90 deg flex L knee (Progressing, not met)  - Decrease Pain to 3/10 as worst with all PT interventions (Progressing, not met)  - Pt to self correct posture and gait with no AD " or brace and minimal cues (Progressing, not met)  - Pt independent with HEP with progressions. (Met)     Long Term Goals ( 1 year):(Progressing, not met)  - Pt will increase ROM to 0 deg L knee hyperextension and 110 deg flexion  - Pt will increase strength to 5/5 L knee in all planes  - Decrease Pain to 0/10 as worst with stairs, walking and lifting weights  - Pt to return to 80% PLOF    PLAN     Plan of care Certification: 12/13/2024 to 12/31/25.     Outpatient Physical Therapy 2 times weekly for 52 weeks to include the following interventions: Gait Training, Manual Therapy, Moist Heat/ Ice, Neuromuscular Re-ed, Patient Education, Therapeutic Activities, and Therapeutic Exercise.    Edwina Kelly PTA,

## 2025-02-14 ENCOUNTER — CLINICAL SUPPORT (OUTPATIENT)
Dept: REHABILITATION | Facility: HOSPITAL | Age: 25
End: 2025-02-14
Payer: MEDICAID

## 2025-02-14 DIAGNOSIS — M25.562 ACUTE PAIN OF LEFT KNEE: Primary | ICD-10-CM

## 2025-02-14 PROCEDURE — 97110 THERAPEUTIC EXERCISES: CPT

## 2025-02-14 NOTE — PROGRESS NOTES
[facility]   Physical Therapy Treatment Note     Name: Felicity Cohen  Clinic Number: [id number]    Therapy Diagnosis:   Encounter Diagnosis   Name Primary?    Acute pain of left knee Yes       Physician: Jhony Berry MD    Visit Date: 2/14/2025    Physician Orders: PT Eval and Treat   Medical Diagnosis from Referral:   S83.512A (ICD-10-CM) - Rupture of anterior cruciate ligament of left knee, initial encounter   S83.422A (ICD-10-CM) - Tear of lateral collateral ligament of left knee, initial encounter   S83.8X2D (ICD-10-CM) - Acute medial meniscal injury of left knee, subsequent encounter      Evaluation Date: 10/24/2024  Re-evaluation date: 12/13/2024  Authorization Period Expiration: 12/31/25  Plan of Care Expiration: 12/31/25  Visit # / Visits authorized: 11/ 20   FOTO: 1/3     Time In: 150 pm  Time Out: 250 pm   Total Billable Time: 60 minutes (PT extender used in line of sight)     Precautions: Standard, post op  PROCEDURE PERFORMED:   Left ACL reconstruction with quadriceps tendon autograft   Left posterolateral corner reconstruction with allograft  Left knee lateral extra-articular tenodesis  Left knee arthroscopic medial meniscus repair   Left knee arthroscopic lateral meniscus repair   Left knee arthroscopic synovectomy   Left knee arthroscopic lysis of adhesions   Left knee open peroneal nerve neurolysis      SUBJECTIVE     Pt reports:did fine after last session.   She was compliant with home exercise program.  Response to previous treatment: none  Functional change:  on going    Pain: not quantified/10  Location: left knee      OBJECTIVE   2/14/25    L knee ROM:  5-0-95 degrees    Treatment       Felicity received the treatments listed below:      Therapeutic exercises to develop strength, endurance, ROM, posture, and core stabilization for 60 minutes including:  Bike x10 min available ROM  Scar tissue mobs  ROM check  Patellar/fat pad mobs multi angle-  NMES with LAQ  "iso 10 sec on/30 sec off x 15 min  Knee flex off EOT with uninvolved leg assist  Heel raises x20  Shuttle SL #75 3x10  SLR EOT 3x10  Standing hip abd 3x10      Np:  Mini squats x20-np  Prone quad stretch 10x10" hold-np  Quad sets with strap x fatigue-np  Knee flexion heel slide with EOT elevated x 5 min-np  Heel slide with QS x5 min-np    *BILLED PER MEDICAID GUIDELINES*      Patient Education and Home Exercises     Home Exercises Provided and Patient Education Provided     Education provided:   - cont HEP, heel slides with brace donned    Written Home Exercises Provided: Patient instructed to cont prior HEP. Exercises were reviewed and Felicity was able to demonstrate them prior to the end of the session.  Felicity demonstrated good  understanding of the education provided. See EMR under Patient Instructions for exercises provided during therapy sessions    ASSESSMENT     Felicity tolerated SAQ with NMES well to progress quad strength. Still making progress with knee flexion after a few attempts off EOT. Plan to progress as tolerated. .    Felicity Is progressing well towards her goals.     Pt prognosis is Good.     Pt will continue to benefit from skilled outpatient physical therapy to address the deficits listed in the problem list box on initial evaluation, provide pt/family education and to maximize pt's level of independence in the home and community environment.     Pt's spiritual, cultural and educational needs considered and pt agreeable to plan of care and goals.     Anticipated barriers to physical therapy: none    Goals:   Short Term Goals: 6 weeks Post op  - Pt will increase ROM to +5 L knee hyperextension, 90 deg flex L knee (Progressing, not met)  - Decrease Pain to 3/10 as worst with all PT interventions (Progressing, not met)  - Pt to self correct posture and gait with no AD or brace and minimal cues (Progressing, not met)  - Pt independent with HEP with progressions. (Met)     Long Term Goals ( 1 " year):(Progressing, not met)  - Pt will increase ROM to 0 deg L knee hyperextension and 110 deg flexion  - Pt will increase strength to 5/5 L knee in all planes  - Decrease Pain to 0/10 as worst with stairs, walking and lifting weights  - Pt to return to 80% PLOF    PLAN     Plan of care Certification: 12/13/2024 to 12/31/25.     Outpatient Physical Therapy 2 times weekly for 52 weeks to include the following interventions: Gait Training, Manual Therapy, Moist Heat/ Ice, Neuromuscular Re-ed, Patient Education, Therapeutic Activities, and Therapeutic Exercise.    Deborah Coe, PT,

## 2025-02-19 ENCOUNTER — CLINICAL SUPPORT (OUTPATIENT)
Dept: REHABILITATION | Facility: HOSPITAL | Age: 25
End: 2025-02-19
Payer: MEDICAID

## 2025-02-19 DIAGNOSIS — M25.562 ACUTE PAIN OF LEFT KNEE: Primary | ICD-10-CM

## 2025-02-19 PROCEDURE — 97110 THERAPEUTIC EXERCISES: CPT | Mod: CQ

## 2025-02-19 NOTE — PROGRESS NOTES
[facility]   Physical Therapy Treatment Note     Name: Felicity Cohen  Clinic Number: [id number]    Therapy Diagnosis:   No diagnosis found.      Physician: Jhony Berry MD    Visit Date: 2/19/2025    Physician Orders: PT Eval and Treat   Medical Diagnosis from Referral:   S83.512A (ICD-10-CM) - Rupture of anterior cruciate ligament of left knee, initial encounter   S83.422A (ICD-10-CM) - Tear of lateral collateral ligament of left knee, initial encounter   S83.8X2D (ICD-10-CM) - Acute medial meniscal injury of left knee, subsequent encounter      Evaluation Date: 10/24/2024  Re-evaluation date: 12/13/2024  Authorization Period Expiration: 12/31/25  Plan of Care Expiration: 12/31/25  Visit # / Visits authorized: 12/ 20   FOTO: 1/3     Time In: 2:30 pm  Time Out: 3:40 pm   Total Billable Time: 60 minutes      Precautions: Standard, post op  PROCEDURE PERFORMED:   Left ACL reconstruction with quadriceps tendon autograft   Left posterolateral corner reconstruction with allograft  Left knee lateral extra-articular tenodesis  Left knee arthroscopic medial meniscus repair   Left knee arthroscopic lateral meniscus repair   Left knee arthroscopic synovectomy   Left knee arthroscopic lysis of adhesions   Left knee open peroneal nerve neurolysis      SUBJECTIVE     Pt reports:scar has been burning when trying to sleep at night.   She was compliant with home exercise program.  Response to previous treatment: none  Functional change:  on going    Pain: not quantified/10  Location: left knee      OBJECTIVE   2/14/25    L knee ROM:  5-0-95 degrees    Treatment       Felicity received the treatments listed below:      Therapeutic exercises to develop strength, endurance, ROM, posture, and core stabilization for 70 minutes including:  Bike x10 min available ROM  Scar tissue mobs  ROM check  Patellar/fat pad mobs multi angle-  NMES with LAQ iso 10 sec on/30 sec off x 10 min  Knee flex off EOT with  PTA  Ambulate 1 crutch  Hurdles with 1 crutch      Np:  Heel raises x20  SLR EOT 3x10  Shuttle SL #75 3x10  Standing hip abd 3x10  *BILLED PER MEDICAID GUIDELINES*      Patient Education and Home Exercises     Home Exercises Provided and Patient Education Provided     Education provided:   - cont HEP, heel slides with brace donned    Written Home Exercises Provided: Patient instructed to cont prior HEP. Exercises were reviewed and Felicity was able to demonstrate them prior to the end of the session.  Felicity demonstrated good  understanding of the education provided. See EMR under Patient Instructions for exercises provided during therapy sessions    ASSESSMENT     Felicity started her session at 85 degrees of flexion. By end of session knee flexion was 95 with over pressure from PTA. Still has quad strength deficits. Pt told to progress to 1 crutch at this time. Worked on ambulating with 1 crutch today to ensure good gait mechanics.     Felicity Is progressing well towards her goals.     Pt prognosis is Good.     Pt will continue to benefit from skilled outpatient physical therapy to address the deficits listed in the problem list box on initial evaluation, provide pt/family education and to maximize pt's level of independence in the home and community environment.     Pt's spiritual, cultural and educational needs considered and pt agreeable to plan of care and goals.     Anticipated barriers to physical therapy: none    Goals:   Short Term Goals: 6 weeks Post op  - Pt will increase ROM to +5 L knee hyperextension, 90 deg flex L knee (Progressing, not met)  - Decrease Pain to 3/10 as worst with all PT interventions (Progressing, not met)  - Pt to self correct posture and gait with no AD or brace and minimal cues (Progressing, not met)  - Pt independent with HEP with progressions. (Met)     Long Term Goals ( 1 year):(Progressing, not met)  - Pt will increase ROM to 0 deg L knee hyperextension and 110 deg  flexion  - Pt will increase strength to 5/5 L knee in all planes  - Decrease Pain to 0/10 as worst with stairs, walking and lifting weights  - Pt to return to 80% PLOF    PLAN     Plan of care Certification: 12/13/2024 to 12/31/25.     Outpatient Physical Therapy 2 times weekly for 52 weeks to include the following interventions: Gait Training, Manual Therapy, Moist Heat/ Ice, Neuromuscular Re-ed, Patient Education, Therapeutic Activities, and Therapeutic Exercise.    Edwina Kelly PTA,

## 2025-02-21 ENCOUNTER — CLINICAL SUPPORT (OUTPATIENT)
Dept: REHABILITATION | Facility: HOSPITAL | Age: 25
End: 2025-02-21
Payer: MEDICAID

## 2025-02-21 DIAGNOSIS — M25.562 ACUTE PAIN OF LEFT KNEE: Primary | ICD-10-CM

## 2025-02-21 PROCEDURE — 97110 THERAPEUTIC EXERCISES: CPT

## 2025-02-21 NOTE — PROGRESS NOTES
[facility]   Physical Therapy Treatment Note     Name: Felicity MENJIVAR [facility] Number: [id number]    Therapy Diagnosis:   Encounter Diagnosis   Name Primary?    Acute pain of left knee Yes         Physician: Jhony Berry MD    Visit Date: 2/21/2025    Physician Orders: PT Eval and Treat   Medical Diagnosis from Referral:   S83.512A (ICD-10-CM) - Rupture of anterior cruciate ligament of left knee, initial encounter   S83.422A (ICD-10-CM) - Tear of lateral collateral ligament of left knee, initial encounter   S83.8X2D (ICD-10-CM) - Acute medial meniscal injury of left knee, subsequent encounter      Evaluation Date: 10/24/2024  Re-evaluation date: 12/13/2024  Authorization Period Expiration: 12/31/25  Plan of Care Expiration: 12/31/25  Visit # / Visits authorized: 13/ 20   FOTO: 1/3     Time In: 2:00pm  Time Out: 3:00 pm   Total Billable Time: 60 minutes      Precautions: Standard, post op  PROCEDURE PERFORMED:   Left ACL reconstruction with quadriceps tendon autograft   Left posterolateral corner reconstruction with allograft  Left knee lateral extra-articular tenodesis  Left knee arthroscopic medial meniscus repair   Left knee arthroscopic lateral meniscus repair   Left knee arthroscopic synovectomy   Left knee arthroscopic lysis of adhesions   Left knee open peroneal nerve neurolysis      SUBJECTIVE     Pt reports:did fine after last session.   She was compliant with home exercise program.  Response to previous treatment: none  Functional change:  on going    Pain: not quantified/10  Location: left knee      OBJECTIVE   2/21/25    L knee ROM:  5-0-100 degrees    Treatment       Felicity received the treatments listed below:      Therapeutic exercises to develop strength, endurance, ROM, posture, and core stabilization for 60 minutes including:  Bike x10 min available ROM  Scar tissue mobs-np  ROM check  Patellar/fat pad mobs multi angle-  NMES with LAQ iso 10 sec on/30 sec off x 10  min  Knee flex off EOT with PT  Ambulate 1 crutch-np  Hurdles with 1 crutch-np  Shuttle SL Heel raises x30 50#  Shuttle SL #50 3x10  Shuttle dbl 75# 3x10    *BILLED PER MEDICAID GUIDELINES*      Patient Education and Home Exercises     Home Exercises Provided and Patient Education Provided     Education provided:   - cont HEP, heel slides with brace donned    Written Home Exercises Provided: Patient instructed to cont prior HEP. Exercises were reviewed and Felicity was able to demonstrate them prior to the end of the session.  Felicity demonstrated good  understanding of the education provided. See EMR under Patient Instructions for exercises provided during therapy sessions    ASSESSMENT     Felicity's treatment was focused on adding shuttle interventions and she noted appropriate fatigue toward end of sets. Also, she tolerated SL heel raises on shuttle to start transitioning out of brace and crutch at MD follow up on 3/6.    Felicity Is progressing well towards her goals.     Pt prognosis is Good.     Pt will continue to benefit from skilled outpatient physical therapy to address the deficits listed in the problem list box on initial evaluation, provide pt/family education and to maximize pt's level of independence in the home and community environment.     Pt's spiritual, cultural and educational needs considered and pt agreeable to plan of care and goals.     Anticipated barriers to physical therapy: none    Goals:   Short Term Goals: 6 weeks Post op  - Pt will increase ROM to +5 L knee hyperextension, 90 deg flex L knee (Progressing, not met)  - Decrease Pain to 3/10 as worst with all PT interventions (Progressing, not met)  - Pt to self correct posture and gait with no AD or brace and minimal cues (Progressing, not met)  - Pt independent with HEP with progressions. (Met)     Long Term Goals ( 1 year):(Progressing, not met)  - Pt will increase ROM to 0 deg L knee hyperextension and 110 deg flexion  - Pt will  increase strength to 5/5 L knee in all planes  - Decrease Pain to 0/10 as worst with stairs, walking and lifting weights  - Pt to return to 80% PLOF    PLAN     Plan of care Certification: 12/13/2024 to 12/31/25.     Outpatient Physical Therapy 2 times weekly for 52 weeks to include the following interventions: Gait Training, Manual Therapy, Moist Heat/ Ice, Neuromuscular Re-ed, Patient Education, Therapeutic Activities, and Therapeutic Exercise.    Deborah Coe, PT,

## 2025-02-26 ENCOUNTER — CLINICAL SUPPORT (OUTPATIENT)
Dept: REHABILITATION | Facility: HOSPITAL | Age: 25
End: 2025-02-26
Payer: MEDICAID

## 2025-02-26 DIAGNOSIS — M25.562 ACUTE PAIN OF LEFT KNEE: Primary | ICD-10-CM

## 2025-02-26 PROCEDURE — 97110 THERAPEUTIC EXERCISES: CPT | Mod: CQ

## 2025-02-26 NOTE — PROGRESS NOTES
[facility]   Physical Therapy Treatment Note     Name: Felicity MENJIVAR Noel  [facility] Number: [id number]    Therapy Diagnosis:   Encounter Diagnosis   Name Primary?    Acute pain of left knee Yes         Physician: Jhony Berry MD    Visit Date: 2/26/2025    Physician Orders: PT Eval and Treat   Medical Diagnosis from Referral:   S83.512A (ICD-10-CM) - Rupture of anterior cruciate ligament of left knee, initial encounter   S83.422A (ICD-10-CM) - Tear of lateral collateral ligament of left knee, initial encounter   S83.8X2D (ICD-10-CM) - Acute medial meniscal injury of left knee, subsequent encounter      Evaluation Date: 10/24/2024  Re-evaluation date: 12/13/2024  Authorization Period Expiration: 12/31/25  Plan of Care Expiration: 12/31/25  Visit # / Visits authorized: 14/ 20   FOTO: 1/3     Time In: 2:30 pm  Time Out: 3:30 pm   Total Billable Time: 60 minutes      Precautions: Standard, post op  PROCEDURE PERFORMED:   Left ACL reconstruction with quadriceps tendon autograft   Left posterolateral corner reconstruction with allograft  Left knee lateral extra-articular tenodesis  Left knee arthroscopic medial meniscus repair   Left knee arthroscopic lateral meniscus repair   Left knee arthroscopic synovectomy   Left knee arthroscopic lysis of adhesions   Left knee open peroneal nerve neurolysis      SUBJECTIVE     Pt reports:did fine after last session.   She was compliant with home exercise program.  Response to previous treatment: none  Functional change:  on going    Pain: not quantified/10  Location: left knee      OBJECTIVE   2/21/25    L knee ROM:  5-0-100 degrees    Treatment       Felicity received the treatments listed below:      Therapeutic exercises to develop strength, endurance, ROM, posture, and core stabilization for 60 minutes including:  Bike x10 min available ROM  ROM check  Patellar/fat pad mobs multi angle-  NMES with LAQ iso 10 sec on/30 sec off x 10 min-NP  TKE with wt  shift GTB x 5 min  Knee flex off EOT with PT  Shuttle SL Heel raises x30 50#  Shuttle SL #50 3x10  Shuttle dbl 75# 3x10  SAQ towel different heights 3x10 ea.    *BILLED PER MEDICAID GUIDELINES*      Patient Education and Home Exercises     Home Exercises Provided and Patient Education Provided     Education provided:   - cont HEP, heel slides with brace donned    Written Home Exercises Provided: Patient instructed to cont prior HEP. Exercises were reviewed and Felicity was able to demonstrate them prior to the end of the session.  Felicity demonstrated good  understanding of the education provided. See EMR under Patient Instructions for exercises provided during therapy sessions    ASSESSMENT     Felicity started her session with 85 degrees of flexion. Flexion improved to 95 by end of session. Pt did not eat before coming into session. Educated pt about eating before and after her PT sessions. Added TKE and SAQ with towel under knee to improve end range quad strength. Still unable to actively hyperextend her knee. Still has bouts of knee flexed with ambulating.    Felicity Is progressing well towards her goals.     Pt prognosis is Good.     Pt will continue to benefit from skilled outpatient physical therapy to address the deficits listed in the problem list box on initial evaluation, provide pt/family education and to maximize pt's level of independence in the home and community environment.     Pt's spiritual, cultural and educational needs considered and pt agreeable to plan of care and goals.     Anticipated barriers to physical therapy: none    Goals:   Short Term Goals: 6 weeks Post op  - Pt will increase ROM to +5 L knee hyperextension, 90 deg flex L knee (Progressing, not met)  - Decrease Pain to 3/10 as worst with all PT interventions (Progressing, not met)  - Pt to self correct posture and gait with no AD or brace and minimal cues (Progressing, not met)  - Pt independent with HEP with progressions. (Met)      Long Term Goals ( 1 year):(Progressing, not met)  - Pt will increase ROM to 0 deg L knee hyperextension and 110 deg flexion  - Pt will increase strength to 5/5 L knee in all planes  - Decrease Pain to 0/10 as worst with stairs, walking and lifting weights  - Pt to return to 80% PLOF    PLAN     Plan of care Certification: 12/13/2024 to 12/31/25.     Outpatient Physical Therapy 2 times weekly for 52 weeks to include the following interventions: Gait Training, Manual Therapy, Moist Heat/ Ice, Neuromuscular Re-ed, Patient Education, Therapeutic Activities, and Therapeutic Exercise.    Edwina Kelly, PTA,

## 2025-02-28 ENCOUNTER — CLINICAL SUPPORT (OUTPATIENT)
Dept: REHABILITATION | Facility: HOSPITAL | Age: 25
End: 2025-02-28
Payer: MEDICAID

## 2025-02-28 DIAGNOSIS — M25.562 ACUTE PAIN OF LEFT KNEE: Primary | ICD-10-CM

## 2025-02-28 PROCEDURE — 97110 THERAPEUTIC EXERCISES: CPT

## 2025-02-28 NOTE — PROGRESS NOTES
[facility]   Physical Therapy Treatment Note     Name: Felicity Cohen  Clinic Number: [id number]    Therapy Diagnosis:   Encounter Diagnosis   Name Primary?    Acute pain of left knee Yes         Physician: Jhony Berry MD    Visit Date: 2/28/2025    Physician Orders: PT Eval and Treat   Medical Diagnosis from Referral:   S83.512A (ICD-10-CM) - Rupture of anterior cruciate ligament of left knee, initial encounter   S83.422A (ICD-10-CM) - Tear of lateral collateral ligament of left knee, initial encounter   S83.8X2D (ICD-10-CM) - Acute medial meniscal injury of left knee, subsequent encounter      Evaluation Date: 10/24/2024  Re-evaluation date: 12/13/2024  Authorization Period Expiration: 12/31/25  Plan of Care Expiration: 12/31/25  Visit # / Visits authorized: 15/ 20   FOTO: 1/3     Time In: 100 pm  Time Out: 200 pm   Total Billable Time: 60 minutes (PT extender used)     Precautions: Standard, post op  PROCEDURE PERFORMED:   Left ACL reconstruction with quadriceps tendon autograft   Left posterolateral corner reconstruction with allograft  Left knee lateral extra-articular tenodesis  Left knee arthroscopic medial meniscus repair   Left knee arthroscopic lateral meniscus repair   Left knee arthroscopic synovectomy   Left knee arthroscopic lysis of adhesions   Left knee open peroneal nerve neurolysis      SUBJECTIVE     Pt reports:continues to work on flexion at home.   She was compliant with home exercise program.  Response to previous treatment: none  Functional change:  on going    Pain: not quantified/10  Location: left knee      OBJECTIVE   2/28/25    L knee ROM:  5-0-105 degrees    Treatment       Felicity received the treatments listed below:      Therapeutic exercises to develop strength, endurance, ROM, posture, and core stabilization for 60 minutes including:  Bike x10 min available ROM  Quad set with heel prop into hyperextension 30 sec on/30 sec rest x5  ROM  check  Patellar/fat pad mobs multi angle-np  NMES with LAQ iso 10 sec on/30 sec off x 10 min-NP  TKE with wt shift GTB x 5 min-np  Knee flex off EOT with PT  SLR at EOT 3x10  Shuttle SL Heel raises x30 75#  Shuttle SL 75# 3x10  Shuttle dbl 75# 3x10  SAQ towel different heights 3x10 ea.-np  Gait assessment without brace    *BILLED PER MEDICAID GUIDELINES*      Patient Education and Home Exercises     Home Exercises Provided and Patient Education Provided     Education provided:   - cont HEP, heel slides with brace donned    Written Home Exercises Provided: Patient instructed to cont prior HEP. Exercises were reviewed and Felicity was able to demonstrate them prior to the end of the session.  Felicity demonstrated good  understanding of the education provided. See EMR under Patient Instructions for exercises provided during therapy sessions    ASSESSMENT     Felicity was able to progress to 105 deg flexion this session. Also, showed improved TKE in mid stance with gait for when she is d/c from brace next week. Pt encouraged to decrease bart, step length and activate quad prior to heel strike to prevent ext lag.     Felicity Is progressing well towards her goals.     Pt prognosis is Good.     Pt will continue to benefit from skilled outpatient physical therapy to address the deficits listed in the problem list box on initial evaluation, provide pt/family education and to maximize pt's level of independence in the home and community environment.     Pt's spiritual, cultural and educational needs considered and pt agreeable to plan of care and goals.     Anticipated barriers to physical therapy: none    Goals:   Short Term Goals: 6 weeks Post op  - Pt will increase ROM to +5 L knee hyperextension, 90 deg flex L knee (Progressing, not met)  - Decrease Pain to 3/10 as worst with all PT interventions (Progressing, not met)  - Pt to self correct posture and gait with no AD or brace and minimal cues (Progressing, not  met)  - Pt independent with HEP with progressions. (Met)     Long Term Goals ( 1 year):(Progressing, not met)  - Pt will increase ROM to 0 deg L knee hyperextension and 110 deg flexion  - Pt will increase strength to 5/5 L knee in all planes  - Decrease Pain to 0/10 as worst with stairs, walking and lifting weights  - Pt to return to 80% PLOF    PLAN     Plan of care Certification: 12/13/2024 to 12/31/25.     Outpatient Physical Therapy 2 times weekly for 52 weeks to include the following interventions: Gait Training, Manual Therapy, Moist Heat/ Ice, Neuromuscular Re-ed, Patient Education, Therapeutic Activities, and Therapeutic Exercise.    Deborah Coe, PT,

## 2025-03-03 ENCOUNTER — TELEPHONE (OUTPATIENT)
Dept: SPORTS MEDICINE | Facility: CLINIC | Age: 25
End: 2025-03-03
Payer: MEDICAID

## 2025-03-03 ENCOUNTER — CLINICAL SUPPORT (OUTPATIENT)
Dept: REHABILITATION | Facility: HOSPITAL | Age: 25
End: 2025-03-03
Payer: MEDICAID

## 2025-03-03 DIAGNOSIS — M25.562 ACUTE PAIN OF LEFT KNEE: Primary | ICD-10-CM

## 2025-03-03 PROCEDURE — 97110 THERAPEUTIC EXERCISES: CPT | Mod: CQ

## 2025-03-03 NOTE — PROGRESS NOTES
[facility]   Physical Therapy Treatment Note     Name: Felicity Cohen  [facility] Number: [id number]    Therapy Diagnosis:   Encounter Diagnosis   Name Primary?    Acute pain of left knee Yes           Physician: Jhony Berry MD    Visit Date: 3/3/2025    Physician Orders: PT Eval and Treat   Medical Diagnosis from Referral:   S83.512A (ICD-10-CM) - Rupture of anterior cruciate ligament of left knee, initial encounter   S83.422A (ICD-10-CM) - Tear of lateral collateral ligament of left knee, initial encounter   S83.8X2D (ICD-10-CM) - Acute medial meniscal injury of left knee, subsequent encounter      Evaluation Date: 10/24/2024  Re-evaluation date: 12/13/2024  Authorization Period Expiration: 12/31/25  Plan of Care Expiration: 12/31/25  Visit # / Visits authorized: 16/ 20   FOTO: 1/3     Time In: 2:30 p  Time Out: 3:20 p  Total Billable Time: 60 minutes      Precautions: Standard, post op  PROCEDURE PERFORMED:   Left ACL reconstruction with quadriceps tendon autograft   Left posterolateral corner reconstruction with allograft  Left knee lateral extra-articular tenodesis  Left knee arthroscopic medial meniscus repair   Left knee arthroscopic lateral meniscus repair   Left knee arthroscopic synovectomy   Left knee arthroscopic lysis of adhesions   Left knee open peroneal nerve neurolysis      SUBJECTIVE     Pt reports:doing my HEP  She was compliant with home exercise program.  Response to previous treatment: none  Functional change:  on going    Pain: not quantified/10  Location: left knee      OBJECTIVE   3/3/25    L knee ROM:  5-0-110 degrees    Treatment       Felicity received the treatments listed below:      Therapeutic exercises to develop strength, endurance, ROM, posture, and core stabilization for 60 minutes including:  Bike x10 min available ROM  Quad set with heel prop into hyperextension 30 sec on/30 sec rest x5  ROM check  Patellar/fat pad mobs multi angle-np  NMES with  "LAQ iso 10 sec on/30 sec off x 10 min-NP  TKE with wt shift GTB x 5 min-np  Knee flex off EOT with PT  SLR at EOT 3x10  Leg press SL 80# 3x10  SAQ towel different heights 3x10 ea.  Sit to stand 24" box  DL 4x10  Gait assessment without brace    NP:  Shuttle dbl 75# 3x10  Shuttle SL Heel raises x30 75#  *BILLED PER MEDICAID GUIDELINES*      Patient Education and Home Exercises     Home Exercises Provided and Patient Education Provided     Education provided:   - cont HEP, heel slides with brace donned    Written Home Exercises Provided: Patient instructed to cont prior HEP. Exercises were reviewed and Felicity was able to demonstrate them prior to the end of the session.  Felicity demonstrated good  understanding of the education provided. See EMR under Patient Instructions for exercises provided during therapy sessions    ASSESSMENT     Felicity was able to progress to 110 degrees of flexion today at end of session. Quad strength deficit still present. Pt still unable to actively hyperextend her knee actively. Improvements with TKE in stance phase of gait but still needs some cueing. Added functional sit to stand today with minimal depth. Will cont to progress as tolerated.    Felicity Is progressing well towards her goals.     Pt prognosis is Good.     Pt will continue to benefit from skilled outpatient physical therapy to address the deficits listed in the problem list box on initial evaluation, provide pt/family education and to maximize pt's level of independence in the home and community environment.     Pt's spiritual, cultural and educational needs considered and pt agreeable to plan of care and goals.     Anticipated barriers to physical therapy: none    Goals:   Short Term Goals: 6 weeks Post op  - Pt will increase ROM to +5 L knee hyperextension, 90 deg flex L knee (Progressing, not met)  - Decrease Pain to 3/10 as worst with all PT interventions (Progressing, not met)  - Pt to self correct posture and " gait with no AD or brace and minimal cues (Progressing, not met)  - Pt independent with HEP with progressions. (Met)     Long Term Goals ( 1 year):(Progressing, not met)  - Pt will increase ROM to 0 deg L knee hyperextension and 110 deg flexion  - Pt will increase strength to 5/5 L knee in all planes  - Decrease Pain to 0/10 as worst with stairs, walking and lifting weights  - Pt to return to 80% PLOF    PLAN     Plan of care Certification: 12/13/2024 to 12/31/25.     Outpatient Physical Therapy 2 times weekly for 52 weeks to include the following interventions: Gait Training, Manual Therapy, Moist Heat/ Ice, Neuromuscular Re-ed, Patient Education, Therapeutic Activities, and Therapeutic Exercise.    Edwina Kelly PTA,

## 2025-03-05 ENCOUNTER — CLINICAL SUPPORT (OUTPATIENT)
Dept: REHABILITATION | Facility: HOSPITAL | Age: 25
End: 2025-03-05
Payer: MEDICAID

## 2025-03-05 DIAGNOSIS — M25.562 ACUTE PAIN OF LEFT KNEE: Primary | ICD-10-CM

## 2025-03-05 PROCEDURE — 97110 THERAPEUTIC EXERCISES: CPT | Mod: CQ

## 2025-03-05 NOTE — PROGRESS NOTES
[facility]   Physical Therapy Treatment Note     Name: Felicity Cohen  Clinic Number: [id number]    Therapy Diagnosis:   Encounter Diagnosis   Name Primary?    Acute pain of left knee Yes         Physician: Johny Berry MD    Visit Date: 3/5/2025    Physician Orders: PT Eval and Treat   Medical Diagnosis from Referral:   S83.512A (ICD-10-CM) - Rupture of anterior cruciate ligament of left knee, initial encounter   S83.422A (ICD-10-CM) - Tear of lateral collateral ligament of left knee, initial encounter   S83.8X2D (ICD-10-CM) - Acute medial meniscal injury of left knee, subsequent encounter      Evaluation Date: 10/24/2024  Re-evaluation date: 12/13/2024  Authorization Period Expiration: 12/31/25  Plan of Care Expiration: 12/31/25  Visit # / Visits authorized: 17/ 20   FOTO: 1/3     Time In: 2:30 p  Time Out: 3:25 p  Total Billable Time: 55 minutes    PT tech extender used with in line of site  Precautions: Standard, post op  PROCEDURE PERFORMED:   Left ACL reconstruction with quadriceps tendon autograft   Left posterolateral corner reconstruction with allograft  Left knee lateral extra-articular tenodesis  Left knee arthroscopic medial meniscus repair   Left knee arthroscopic lateral meniscus repair   Left knee arthroscopic synovectomy   Left knee arthroscopic lysis of adhesions   Left knee open peroneal nerve neurolysis      SUBJECTIVE     Pt reports:knee feels tight  She was compliant with home exercise program.  Response to previous treatment: none  Functional change:  on going    Pain: not quantified/10  Location: left knee      OBJECTIVE   3/3/25    L knee ROM:  5-0-108 degrees    Treatment       Felicity received the treatments listed below:      Therapeutic exercises to develop strength, endurance, ROM, posture, and core stabilization for 55 minutes including:  Bike x10 min available ROM  Quad set with heel prop into hyperextension 30 sec on/30 sec rest x5  ROM check  Prone  "quad stretch 10x10"  Patellar/fat pad mobs multi angle  NMES with LAQ iso 10 sec on/30 sec off x 10 min-NP  TKE with wt shift GTB x 5 min-np  Knee flex off EOT with PT  SLR at EOT 3x10  Shuttle SL 75# 3x10  SAQ towel different heights 4x10 ea.  Sit to stand 24" box  DL 4x10  DL heel raises 3x12     NP:  Shuttle dbl 75# 3x10  Shuttle SL Heel raises x30 75#  *BILLED PER MEDICAID GUIDELINES*      Patient Education and Home Exercises     Home Exercises Provided and Patient Education Provided     Education provided:   - cont HEP, heel slides with brace donned    Written Home Exercises Provided: Patient instructed to cont prior HEP. Exercises were reviewed and Felicity was able to demonstrate them prior to the end of the session.  Felicity demonstrated good  understanding of the education provided. See EMR under Patient Instructions for exercises provided during therapy sessions    ASSESSMENT     Priyas knee flexion was a little tight into flexion at start of her session but improved after manual and prone quad stretch. Quad strength deficits still present but seeing improvements in quad strength with TKE during ambulating. Will cont to progress LE strengthening as tolerated and as per tolerance.      Felicity Is progressing well towards her goals.     Pt prognosis is Good.     Pt will continue to benefit from skilled outpatient physical therapy to address the deficits listed in the problem list box on initial evaluation, provide pt/family education and to maximize pt's level of independence in the home and community environment.     Pt's spiritual, cultural and educational needs considered and pt agreeable to plan of care and goals.     Anticipated barriers to physical therapy: none    Goals:   Short Term Goals: 6 weeks Post op  - Pt will increase ROM to +5 L knee hyperextension, 90 deg flex L knee (Progressing, not met)  - Decrease Pain to 3/10 as worst with all PT interventions (Progressing, not met)  - Pt to self " correct posture and gait with no AD or brace and minimal cues (Progressing, not met)  - Pt independent with HEP with progressions. (Met)     Long Term Goals ( 1 year):(Progressing, not met)  - Pt will increase ROM to 0 deg L knee hyperextension and 110 deg flexion  - Pt will increase strength to 5/5 L knee in all planes  - Decrease Pain to 0/10 as worst with stairs, walking and lifting weights  - Pt to return to 80% PLOF    PLAN     Plan of care Certification: 12/13/2024 to 12/31/25.     Outpatient Physical Therapy 2 times weekly for 52 weeks to include the following interventions: Gait Training, Manual Therapy, Moist Heat/ Ice, Neuromuscular Re-ed, Patient Education, Therapeutic Activities, and Therapeutic Exercise.    Edwina Kelly PTA,

## 2025-03-06 ENCOUNTER — HOSPITAL ENCOUNTER (OUTPATIENT)
Dept: RADIOLOGY | Facility: HOSPITAL | Age: 25
Discharge: HOME | End: 2025-03-06
Attending: STUDENT IN AN ORGANIZED HEALTH CARE EDUCATION/TRAINING PROGRAM
Payer: MEDICAID

## 2025-03-06 ENCOUNTER — OFFICE VISIT (OUTPATIENT)
Dept: SPORTS MEDICINE | Facility: CLINIC | Age: 25
End: 2025-03-06
Payer: MEDICAID

## 2025-03-06 VITALS
DIASTOLIC BLOOD PRESSURE: 83 MMHG | HEART RATE: 102 BPM | SYSTOLIC BLOOD PRESSURE: 136 MMHG | HEIGHT: 65 IN | BODY MASS INDEX: 57.08 KG/M2

## 2025-03-06 DIAGNOSIS — S89.92XD INJURY OF POSTEROLATERAL CORNER OF LEFT KNEE, SUBSEQUENT ENCOUNTER: ICD-10-CM

## 2025-03-06 DIAGNOSIS — M25.562 ACUTE PAIN OF LEFT KNEE: Primary | ICD-10-CM

## 2025-03-06 DIAGNOSIS — M25.562 ACUTE PAIN OF LEFT KNEE: ICD-10-CM

## 2025-03-06 DIAGNOSIS — S83.512D RUPTURE OF ANTERIOR CRUCIATE LIGAMENT OF LEFT KNEE, SUBSEQUENT ENCOUNTER: ICD-10-CM

## 2025-03-06 PROCEDURE — 1160F RVW MEDS BY RX/DR IN RCRD: CPT | Mod: CPTII,,, | Performed by: STUDENT IN AN ORGANIZED HEALTH CARE EDUCATION/TRAINING PROGRAM

## 2025-03-06 PROCEDURE — 99024 POSTOP FOLLOW-UP VISIT: CPT | Mod: ,,, | Performed by: STUDENT IN AN ORGANIZED HEALTH CARE EDUCATION/TRAINING PROGRAM

## 2025-03-06 PROCEDURE — 99999 PR PBB SHADOW E&M-EST. PATIENT-LVL III: CPT | Mod: PBBFAC,,, | Performed by: STUDENT IN AN ORGANIZED HEALTH CARE EDUCATION/TRAINING PROGRAM

## 2025-03-06 PROCEDURE — 1159F MED LIST DOCD IN RCRD: CPT | Mod: CPTII,,, | Performed by: STUDENT IN AN ORGANIZED HEALTH CARE EDUCATION/TRAINING PROGRAM

## 2025-03-06 PROCEDURE — 3079F DIAST BP 80-89 MM HG: CPT | Mod: CPTII,,, | Performed by: STUDENT IN AN ORGANIZED HEALTH CARE EDUCATION/TRAINING PROGRAM

## 2025-03-06 PROCEDURE — 73560 X-RAY EXAM OF KNEE 1 OR 2: CPT | Mod: 26,LT,, | Performed by: RADIOLOGY

## 2025-03-06 PROCEDURE — 99213 OFFICE O/P EST LOW 20 MIN: CPT | Mod: PBBFAC,25 | Performed by: STUDENT IN AN ORGANIZED HEALTH CARE EDUCATION/TRAINING PROGRAM

## 2025-03-06 PROCEDURE — 3075F SYST BP GE 130 - 139MM HG: CPT | Mod: CPTII,,, | Performed by: STUDENT IN AN ORGANIZED HEALTH CARE EDUCATION/TRAINING PROGRAM

## 2025-03-06 PROCEDURE — 73560 X-RAY EXAM OF KNEE 1 OR 2: CPT | Mod: TC,LT

## 2025-03-06 NOTE — PROGRESS NOTES
Subjective:     Chief Complaint: Felicity Cohen is a 24 y.o. female who had concerns including Post-op Evaluation of the Left Knee.    HPI    HPI 03/06/2025  History of Present Illness    CHIEF COMPLAINT:  - Left knee follow-up status post surgery    HPI:  Client presents for follow-up regarding her left knee condition. She is currently wearing a knee brace, which should have been discontinued about a month ago. Her knee feels not optimal but somewhat tight, which is an improvement from before. She describes the knee as feeling very stable. She has been undergoing physical therapy at the clinic. She inquires about a scar on her knee, questioning if it was related to the PCL procedure. The practitioner confirms this, explaining it was part of how they fixed the injury. She expresses curiosity about the surgical approach, asking why they don't use a different technique for certain procedures.    PREVIOUS TREATMENTS:  - Client has been wearing a knee brace  - Client is currently undergoing physical therapy for the left knee  - Client had surgery involving multiple ligaments, including PCL repair           HPI 1/25/2025  History of Present Illness    CHIEF COMPLAINT:  - Multiple ligament repair follow-up    HPI:  Client presents for a 6-week post-operative follow-up after knee surgery involving multiple ligament repairs, including ACL reconstruction and work on the posterior lateral corner. She reports overall improvement in her knee's condition, noting it feels different. She has been following post-operative protocols, including wearing a knee brace consistently, even during sleep. Client began 50% weight-bearing yesterday as instructed. She has been cautious during recent snowfall, with family members discouraging crutch use outdoors.    She has been attending physical therapy sessions, working on regaining range of motion and strength. Client expresses some impatience with the recovery timeline, inquiring about  the possibility of returning to school in August, approximately 7-8 months post-surgery. She asks about applying force during knee bending exercises, indicating she has been performing these independently without assistance from physical therapists.    Client denies any significant stiffness in the knee that would require immediate intervention.    PREVIOUS TREATMENTS:  - Client has been using crutches and wearing a knee brace, including while sleeping, for the past 6 weeks.  - Client started physical therapy, which has been ongoing.  - Client began putting 50% weight on the affected leg as of yesterday.    WORK STATUS:  - Student, inquiring about returning to school in August (7-8 months post-surgery)         PROCEDURE PERFORMED by Jhony Berry MD on 12/12/2024:   Left ACL reconstruction with quadriceps tendon autograft   Left posterolateral corner reconstruction with allograft  Left knee lateral extra-articular tenodesis  Left knee arthroscopic medial meniscus repair   Left knee arthroscopic lateral meniscus repair   Left knee arthroscopic synovectomy   Left knee arthroscopic lysis of adhesions   Left knee open peroneal nerve neurolysis    Past Medical History:   Diagnosis Date    ADHD (attention deficit hyperactivity disorder)     Anxiety     Constipation     Depression     GERD (gastroesophageal reflux disease)     IBS (irritable bowel syndrome)     Ovarian cyst     Schizophrenia in children        Current Outpatient Medications on File Prior to Visit   Medication Sig Dispense Refill    acetaminophen (TYLENOL) 325 MG tablet Take 325 mg by mouth every 6 (six) hours as needed.      aspirin (ECOTRIN) 81 MG EC tablet Take 1 tablet (81 mg total) by mouth once daily. 42 tablet 0    biotin 1 mg tablet Take 1,000 mcg by mouth twice a week. (Patient not taking: Reported on 12/27/2024)      butalbital-acetaminophen-caff -40 mg Cap Take 1 capsule by mouth daily as needed.      celecoxib (CELEBREX) 200 MG capsule  Take 1 capsule (200 mg total) by mouth 2 (two) times daily with meals. 60 capsule 0    cetirizine (ZYRTEC) 10 MG tablet Take 10 mg by mouth once daily.      ergocalciferol (ERGOCALCIFEROL) 50,000 unit Cap TAKE ONE CAPSULE BY MOUTH EVERY 7 DAYS (Patient not taking: Reported on 12/27/2024) 12 capsule 3    ergocalciferol (ERGOCALCIFEROL) 50,000 unit Cap Take 1 capsule (50,000 Units total) by mouth every 7 days. (Patient not taking: Reported on 12/27/2024) 12 capsule 3    Lactobacillus rhamnosus GG (CULTURELLE) 10 billion cell capsule Take 1 capsule by mouth once daily. (Patient not taking: Reported on 12/27/2024)      methocarbamoL (ROBAXIN) 500 MG Tab Take 1 tablet (500 mg total) by mouth 3 (three) times daily as needed (muscle spasms). 30 tablet 0    mupirocin (BACTROBAN) 2 % ointment Apply topically 3 (three) times daily. (Patient not taking: Reported on 12/27/2024) 15 g 0    omega-3/dha/epa/ala/vitamin D3 (OMEGA-3-DHA-EPA-ALA-VIT D3 ORAL) Take by mouth.      oxyCODONE (ROXICODONE) 5 MG immediate release tablet Take 1 tablet (5 mg total) by mouth every 6 (six) hours as needed for Pain. 21 tablet 0    polyethylene glycol (GLYCOLAX) 17 gram/dose powder Take 17 g by mouth once daily. (Patient not taking: Reported on 12/27/2024) 510 g 11    promethazine (PHENERGAN) 25 MG tablet Take 1 tablet (25 mg total) by mouth every 6 (six) hours as needed for Nausea. 30 tablet 0    triamcinolone acetonide 0.1% (KENALOG) 0.1 % ointment Apply topically 2 (two) times daily. To affected area (Patient not taking: Reported on 12/27/2024) 80 g 0     No current facility-administered medications on file prior to visit.       Past Surgical History:   Procedure Laterality Date    ARTHROSCOPY,KNEE,WITH MENISCUS REPAIR Left 12/12/2024    Procedure: ARTHROSCOPY,KNEE,WITH MENISCUS REPAIR;  Surgeon: Jhony Berry MD;  Location: [facility] OR 2ND FLR;  Service: Orthopedics;  Laterality: Left;    DECOMPRESSION OF NERVE Left 12/12/2024    Procedure:  DECOMPRESSION, NERVE;  Surgeon: Jhony Berry MD;  [facility];  Service: Orthopedics;  Laterality: Left;    ESOPHAGOGASTRODUODENOSCOPY N/A 2/8/2022    Procedure: EGD (ESOPHAGOGASTRODUODENOSCOPY);  Surgeon: Berkley Mckenna MD;  Location: [facility];  Service: Endoscopy;  Laterality: N/A;    RECONSTRUCTION OF ANTERIOR CRUCIATE LIGAMENT USING GRAFT Left 12/12/2024    Procedure: RECONSTRUCTION, KNEE, ACL, QUAD TENDON AUTOGRAFT;  Surgeon: Jhony Berry MD;  Location: [address];  Service: Orthopedics;  Laterality: Left;    RECONSTRUCTION OF LIGAMENT Left 12/12/2024    Procedure: RECONSTRUCTION, POSTEROLATERAL CORNER (PLC) WITH ALLOGRAFT;  Surgeon: Jhony Berry MD;  Location: [facility] OR [address] [place] [address];  Service: Orthopedics;  Laterality: Left;    RECONSTRUCTION, LIGAMENT, KNEE, EXTRA-ARTICULAR Left 12/12/2024    Procedure: RECONSTRUCTION, LIGAMENT, KNEE, EXTRA-ARTICULAR;  Surgeon: Jhony Berry MD;  Location: [address];  Service: Orthopedics;  Laterality: Left;    SYNOVECTOMY, KNEE, LIMITED, ARTHROSCOPIC Left 12/12/2024    Procedure: SYNOVECTOMY, KNEE, LIMITED, ARTHROSCOPIC;  Surgeon: Jhony Berry MD;  Location: [address];  Service: Orthopedics;  Laterality: Left;    WISDOM TOOTH EXTRACTION         Family History   Problem Relation Name Age of Onset    Asthma Mother      Diabetes Father         Social History     Socioeconomic History    Marital status: Single   Tobacco Use    Smoking status: Never     Passive exposure: Yes    Smokeless tobacco: Never   Substance and Sexual Activity    Alcohol use: No    Drug use: Never    Sexual activity: Yes     Partners: Male       Review of Systems   Constitutional: Negative.   HENT: Negative.     Eyes: Negative.    Cardiovascular: Negative.    Respiratory: Negative.     Endocrine: Negative.    Hematologic/Lymphatic: Negative.    Skin: Negative.    Musculoskeletal:  Positive for muscle weakness and stiffness. Negative for falls,  joint pain, joint swelling and myalgias.   Neurological: Negative.    Psychiatric/Behavioral: Negative.     Allergic/Immunologic: Negative.                  Objective:     General: Felicity MENJIVAR is well-developed, well-nourished, appears stated age, in no acute distress, alert and oriented to time, place and person.     General    Nursing note and vitals reviewed.  Constitutional: She is oriented to person, place, and time. She appears well-developed and well-nourished. No distress.   HENT:   Head: Normocephalic and atraumatic.   Nose: Nose normal.   Eyes: EOM are normal.   Cardiovascular:  Intact distal pulses.            Pulmonary/Chest: Effort normal. No respiratory distress.   Neurological: She is alert and oriented to person, place, and time.   Psychiatric: She has a normal mood and affect. Her behavior is normal. Judgment and thought content normal.     General Musculoskeletal Exam   Gait: abnormal         Left Knee Exam     Inspection   Erythema: absent  Scars: present  Swelling: absent  Effusion: absent  Deformity: absent  Bruising: absent    Range of Motion   Extension:  -5   Flexion:  110     Tests   Stability   Lachman: normal (-1 to 2mm)   PCL-Posterior Drawer: normal (0 to 2mm)  MCL - Valgus: normal (0 to 2mm)  LCL - Varus: normal (0 to 2mm)    Other   Sensation: normal    Muscle Strength   Left Lower Extremity   Quadriceps:  4/5   Hamstrin/5     Vascular Exam       Left Pulses  Dorsalis Pedis:      2+  Posterior Tibial:      2+        Edema  Left Lower Leg: absent      Imaging:  X-rays of the left knee from 2025 personally viewed by me on that day these include nonweightbearing AP and lateral.  Evidence of multi ligament knee reconstruction.  No noted complications.    Assessment:   Felicity Cohen is a 24 y.o. female status post above and doing well  Encounter Diagnoses   Name Primary?    Acute pain of left knee Yes    Rupture of anterior cruciate ligament of left knee, subsequent encounter      Injury of posterolateral corner of left knee, subsequent encounter         Plan:     Assessment & Plan    FOLLOW UP:  - Follow up in 5-6 weeks to check knee motion.  - Contact the office if knee motion plateaus at 110 degrees.    PATIENT INSTRUCTIONS:  - Discontinue use of knee brace.  - Wean off crutch use.             This note was generated with the assistance of ambient listening technology. Verbal consent was obtained by the patient and accompanying visitor(s) for the recording of patient appointment to facilitate this note. I attest to having reviewed and edited the generated note for accuracy, though some syntax or spelling errors may persist. Please contact the author of this note for any clarification.      All of the patient's questions were answered. Patient was advised to call the clinic or contact me through the patient portal for any questions or concerns.         Patient questionnaires may have been collected.         I personally performed the service described in the documentation recorded by the scribe in my presence, and it accurately and completely records my words and actions.

## 2025-03-12 ENCOUNTER — CLINICAL SUPPORT (OUTPATIENT)
Dept: REHABILITATION | Facility: HOSPITAL | Age: 25
End: 2025-03-12
Payer: MEDICAID

## 2025-03-12 DIAGNOSIS — M25.562 ACUTE PAIN OF LEFT KNEE: Primary | ICD-10-CM

## 2025-03-12 PROCEDURE — 97110 THERAPEUTIC EXERCISES: CPT | Mod: CQ

## 2025-03-12 NOTE — PROGRESS NOTES
[facility]   Physical Therapy Treatment Note     Name: Felicity Cohen  Clinic Number: [id number]    Therapy Diagnosis:   Encounter Diagnosis   Name Primary?    Acute pain of left knee Yes         Physician: Jhony Berry MD    Visit Date: 3/12/2025    Physician Orders: PT Eval and Treat   Medical Diagnosis from Referral:   S83.512A (ICD-10-CM) - Rupture of anterior cruciate ligament of left knee, initial encounter   S83.422A (ICD-10-CM) - Tear of lateral collateral ligament of left knee, initial encounter   S83.8X2D (ICD-10-CM) - Acute medial meniscal injury of left knee, subsequent encounter      Evaluation Date: 10/24/2024  Re-evaluation date: 12/13/2024  Authorization Period Expiration: 12/31/25  Plan of Care Expiration: 12/31/25  Visit # / Visits authorized: 17/ 20   FOTO: 1/3     Time In: 2:30 p  Time Out: 3:30 p  Total Billable Time: 60 minutes    PT tech extender used with in line of site  Precautions: Standard, post op  PROCEDURE PERFORMED:   Left ACL reconstruction with quadriceps tendon autograft   Left posterolateral corner reconstruction with allograft  Left knee lateral extra-articular tenodesis  Left knee arthroscopic medial meniscus repair   Left knee arthroscopic lateral meniscus repair   Left knee arthroscopic synovectomy   Left knee arthroscopic lysis of adhesions   Left knee open peroneal nerve neurolysis      SUBJECTIVE     Pt reports:knee feels tight  She was compliant with home exercise program.  Response to previous treatment: none  Functional change:  on going    Pain: not quantified/10  Location: left knee      OBJECTIVE   3/12/25    L knee ROM:  5-0-110 degrees    Treatment       Felicity received the treatments listed below:      Therapeutic exercises to develop strength, endurance, ROM, posture, and core stabilization for 55 minutes including:  Bike x10 min available ROM  Quad set with heel prop into hyperextension 30 sec on/30 sec rest x5-np  ROM  "check  Prone quad stretch 10x10"  Patellar/fat pad mobs multi angle  Knee flex off EOT with PT  SLR at EOT 3x10  Bridges 3x10  Shuttle SL 87.5 # 4x8  QS multi level (full boster, rolled towel, no towel) #1.5 3x10  Sit to stand 24" box  DL 4x10  DL heel raises 3x12     NP:  Shuttle dbl 75# 3x10  Shuttle SL Heel raises x30 75#  *BILLED PER MEDICAID GUIDELINES*      Patient Education and Home Exercises     Home Exercises Provided and Patient Education Provided     Education provided:   - cont HEP, heel slides with brace donned    Written Home Exercises Provided: Patient instructed to cont prior HEP. Exercises were reviewed and Felicity was able to demonstrate them prior to the end of the session.  Felicity demonstrated good  understanding of the education provided. See EMR under Patient Instructions for exercises provided during therapy sessions    ASSESSMENT     Felicity ambulated into her session with 1 axillary crutch and no T-scope brace. MD D/C T-scope. Still ambulating with 1 crutch as her quad is not strong enough to ambulate without. Achieved 110 degrees of flexion today. She is 5 degrees from her contralateral side. Still unable to actively hyperextend. Will cont to work on LE strengthening to work towards D/C crutch.    Felicity Is progressing well towards her goals.     Pt prognosis is Good.     Pt will continue to benefit from skilled outpatient physical therapy to address the deficits listed in the problem list box on initial evaluation, provide pt/family education and to maximize pt's level of independence in the home and community environment.     Pt's spiritual, cultural and educational needs considered and pt agreeable to plan of care and goals.     Anticipated barriers to physical therapy: none    Goals:   Short Term Goals: 6 weeks Post op  - Pt will increase ROM to +5 L knee hyperextension, 90 deg flex L knee (Progressing, not met)  - Decrease Pain to 3/10 as worst with all PT interventions " (Progressing, not met)  - Pt to self correct posture and gait with no AD or brace and minimal cues (Progressing, not met)  - Pt independent with HEP with progressions. (Met)     Long Term Goals ( 1 year):(Progressing, not met)  - Pt will increase ROM to 0 deg L knee hyperextension and 110 deg flexion  - Pt will increase strength to 5/5 L knee in all planes  - Decrease Pain to 0/10 as worst with stairs, walking and lifting weights  - Pt to return to 80% PLOF    PLAN     Plan of care Certification: 12/13/2024 to 12/31/25.     Outpatient Physical Therapy 2 times weekly for 52 weeks to include the following interventions: Gait Training, Manual Therapy, Moist Heat/ Ice, Neuromuscular Re-ed, Patient Education, Therapeutic Activities, and Therapeutic Exercise.    Edwina Kelly PTA,

## 2025-03-14 ENCOUNTER — CLINICAL SUPPORT (OUTPATIENT)
Dept: REHABILITATION | Facility: HOSPITAL | Age: 25
End: 2025-03-14
Payer: MEDICAID

## 2025-03-14 DIAGNOSIS — M25.562 ACUTE PAIN OF LEFT KNEE: Primary | ICD-10-CM

## 2025-03-14 PROCEDURE — 97110 THERAPEUTIC EXERCISES: CPT | Mod: CQ

## 2025-03-14 NOTE — PROGRESS NOTES
[facility]   Physical Therapy Treatment Note     Name: Felicity Cohen  Clinic Number: [id number]    Therapy Diagnosis:   No diagnosis found.        Physician: Jhony Berry MD    Visit Date: 3/14/2025    Physician Orders: PT Eval and Treat   Medical Diagnosis from Referral:   S83.512A (ICD-10-CM) - Rupture of anterior cruciate ligament of left knee, initial encounter   S83.422A (ICD-10-CM) - Tear of lateral collateral ligament of left knee, initial encounter   S83.8X2D (ICD-10-CM) - Acute medial meniscal injury of left knee, subsequent encounter      Evaluation Date: 10/24/2024  Re-evaluation date: 12/13/2024  Authorization Period Expiration: 12/31/25  Plan of Care Expiration: 12/31/25  Visit # / Visits authorized: 18/ 20   FOTO: 1/3     Time In: 0810 a  Time Out: 0900  Total Billable Time: 23 minutes    PT tech extender used with in line of site  Precautions: Standard, post op  PROCEDURE PERFORMED:   Left ACL reconstruction with quadriceps tendon autograft   Left posterolateral corner reconstruction with allograft  Left knee lateral extra-articular tenodesis  Left knee arthroscopic medial meniscus repair   Left knee arthroscopic lateral meniscus repair   Left knee arthroscopic synovectomy   Left knee arthroscopic lysis of adhesions   Left knee open peroneal nerve neurolysis      SUBJECTIVE     Pt reports:knee is good.  She was compliant with home exercise program.  Response to previous treatment: none  Functional change:  on going    Pain: not quantified/10  Location: left knee      OBJECTIVE   3/12/25    L knee ROM:  5-0-110 degrees    Treatment       Felicity received the treatments listed below:      Therapeutic exercises to develop strength, endurance, ROM, posture, and core stabilization for 50 minutes including:  Bike x10 min available ROM-NP  Rogue x 5 min  Quad set with heel prop into hyperextension 30 sec on/30 sec rest x5-np  ROM check  Prone quad stretch  "10x10"  Patellar/fat pad mobs multi angle  Knee flex off EOT with PT-NP  Inferior patella mobs in flexion on table   SLR at EOT 3x10  Bridges 3x10  Shuttle SL 87.5 # 4x8  QS multi level (full boster, rolled towel, no towel) #1.5 3x10  Sit to stand 24" box  DL 4x10  DL heel raises 3x12     NP:  Shuttle dbl 75# 3x10  Shuttle SL Heel raises x30 75#  *BILLED PER MEDICAID GUIDELINES*      Patient Education and Home Exercises     Home Exercises Provided and Patient Education Provided     Education provided:   - cont HEP, heel slides with brace donned    Written Home Exercises Provided: Patient instructed to cont prior HEP. Exercises were reviewed and Felicity was able to demonstrate them prior to the end of the session.  Felicity demonstrated good  understanding of the education provided. See EMR under Patient Instructions for exercises provided during therapy sessions    ASSESSMENT     Felicity came in a little tight into flexion which improved towards end of session. Quad strength improve with Quad series during session. Will cont to progress as tolerated to improve quad strength to wean pt from crutch.     Felicity Is progressing well towards her goals.     Pt prognosis is Good.     Pt will continue to benefit from skilled outpatient physical therapy to address the deficits listed in the problem list box on initial evaluation, provide pt/family education and to maximize pt's level of independence in the home and community environment.     Pt's spiritual, cultural and educational needs considered and pt agreeable to plan of care and goals.     Anticipated barriers to physical therapy: none    Goals:   Short Term Goals: 6 weeks Post op  - Pt will increase ROM to +5 L knee hyperextension, 90 deg flex L knee (Progressing, not met)  - Decrease Pain to 3/10 as worst with all PT interventions (Progressing, not met)  - Pt to self correct posture and gait with no AD or brace and minimal cues (Progressing, not met)  - Pt " independent with HEP with progressions. (Met)     Long Term Goals ( 1 year):(Progressing, not met)  - Pt will increase ROM to 0 deg L knee hyperextension and 110 deg flexion  - Pt will increase strength to 5/5 L knee in all planes  - Decrease Pain to 0/10 as worst with stairs, walking and lifting weights  - Pt to return to 80% PLOF    PLAN     Plan of care Certification: 12/13/2024 to 12/31/25.     Outpatient Physical Therapy 2 times weekly for 52 weeks to include the following interventions: Gait Training, Manual Therapy, Moist Heat/ Ice, Neuromuscular Re-ed, Patient Education, Therapeutic Activities, and Therapeutic Exercise.    Edwina Kelly PTA,

## 2025-03-21 ENCOUNTER — CLINICAL SUPPORT (OUTPATIENT)
Dept: REHABILITATION | Facility: HOSPITAL | Age: 25
End: 2025-03-21
Payer: MEDICAID

## 2025-03-21 DIAGNOSIS — M25.562 ACUTE PAIN OF LEFT KNEE: Primary | ICD-10-CM

## 2025-03-21 PROCEDURE — 97110 THERAPEUTIC EXERCISES: CPT

## 2025-03-21 NOTE — PROGRESS NOTES
[facility]   Physical Therapy Treatment Note     Name: Felicity Cohen  Clinic Number: [id number]    Therapy Diagnosis:   Encounter Diagnosis   Name Primary?    Acute pain of left knee Yes           Physician: Jhony Berry MD    Visit Date: 3/21/2025    Physician Orders: PT Eval and Treat   Medical Diagnosis from Referral:   S83.512A (ICD-10-CM) - Rupture of anterior cruciate ligament of left knee, initial encounter   S83.422A (ICD-10-CM) - Tear of lateral collateral ligament of left knee, initial encounter   S83.8X2D (ICD-10-CM) - Acute medial meniscal injury of left knee, subsequent encounter      Evaluation Date: 10/24/2024  Re-evaluation date: 12/13/2024  Authorization Period Expiration: 12/31/25  Plan of Care Expiration: 12/31/25  Visit # / Visits authorized: 20/ 20   FOTO: 1/3     Time In: 150 pm  Time Out: 245 pm  Total Billable Time: 55 minutes    PT tech extender used with in line of site  Precautions: Standard, post op  PROCEDURE PERFORMED:   Left ACL reconstruction with quadriceps tendon autograft   Left posterolateral corner reconstruction with allograft  Left knee lateral extra-articular tenodesis  Left knee arthroscopic medial meniscus repair   Left knee arthroscopic lateral meniscus repair   Left knee arthroscopic synovectomy   Left knee arthroscopic lysis of adhesions   Left knee open peroneal nerve neurolysis      SUBJECTIVE     Pt reports:going to the gym and just feeling weakness in knee.   She was compliant with home exercise program.  Response to previous treatment: none  Functional change:  on going    Pain: not quantified/10  Location: left knee      OBJECTIVE   3/12/25    L knee ROM:  5-0-110 degrees    Treatment       Felicity received the treatments listed below:      Therapeutic exercises to develop strength, endurance, ROM, posture, and core stabilization for 55 minutes including:  Rogue x 10 min  Lateral walking x2 short laps  LAQ off EOT 3x10 to  "tempo  ROM check-np  Prone quad stretch 10x10"-np  Patellar/fat pad mobs multi angle-np  Knee flex off EOT with PT-NP  Inferior patella mobs in flexion on table -np  SLR at EOT 3x15 2#   Bridges 3x10-np  QS multi level (full boster, rolled towel, no towel) #1.5 3x10-np  Sit to stand 20" box  DL 3x10  DL heel raises 3x12 -np  Shuttle dbl 87.5# 3x10  Shuttle SL Heel raises x30 87.5#  SLS on L 3x30 sec hold    *BILLED PER MEDICAID GUIDELINES*      Patient Education and Home Exercises     Home Exercises Provided and Patient Education Provided     Education provided:   - cont HEP, heel slides with brace donned    Written Home Exercises Provided: Patient instructed to cont prior HEP. Exercises were reviewed and Felicity was able to demonstrate them prior to the end of the session.  Felicity demonstrated good  understanding of the education provided. See EMR under Patient Instructions for exercises provided during therapy sessions    ASSESSMENT     Felicity was able to increase cardiovascular endurance and strength on [employer] bike and was able to add more standing and SL interventions well.  Will cont to progress as tolerated to improve quad strength to wean pt from crutch.     Felicity Is progressing well towards her goals.     Pt prognosis is Good.     Pt will continue to benefit from skilled outpatient physical therapy to address the deficits listed in the problem list box on initial evaluation, provide pt/family education and to maximize pt's level of independence in the home and community environment.     Pt's spiritual, cultural and educational needs considered and pt agreeable to plan of care and goals.     Anticipated barriers to physical therapy: none    Goals:   Short Term Goals: 6 weeks Post op  - Pt will increase ROM to +5 L knee hyperextension, 90 deg flex L knee (Progressing, not met)  - Decrease Pain to 3/10 as worst with all PT interventions (Progressing, not met)  - Pt to self correct posture and gait with " no AD or brace and minimal cues (Progressing, not met)  - Pt independent with HEP with progressions. (Met)     Long Term Goals ( 1 year):(Progressing, not met)  - Pt will increase ROM to 0 deg L knee hyperextension and 110 deg flexion  - Pt will increase strength to 5/5 L knee in all planes  - Decrease Pain to 0/10 as worst with stairs, walking and lifting weights  - Pt to return to 80% PLOF    PLAN     Plan of care Certification: 12/13/2024 to 12/31/25.     Outpatient Physical Therapy 2 times weekly for 52 weeks to include the following interventions: Gait Training, Manual Therapy, Moist Heat/ Ice, Neuromuscular Re-ed, Patient Education, Therapeutic Activities, and Therapeutic Exercise.    Deborah Coe, PT,

## 2025-03-26 ENCOUNTER — CLINICAL SUPPORT (OUTPATIENT)
Dept: REHABILITATION | Facility: HOSPITAL | Age: 25
End: 2025-03-26
Payer: MEDICAID

## 2025-03-26 DIAGNOSIS — M25.562 ACUTE PAIN OF LEFT KNEE: Primary | ICD-10-CM

## 2025-03-26 PROCEDURE — 97110 THERAPEUTIC EXERCISES: CPT | Mod: CQ

## 2025-03-26 NOTE — PROGRESS NOTES
"[facility]   Physical Therapy Treatment Note     Name: Felicity Cohen  Clinic Number: [id number]    Therapy Diagnosis:   No diagnosis found.          Physician: Jhony Berry MD    Visit Date: 3/26/2025    Physician Orders: PT Eval and Treat   Medical Diagnosis from Referral:   S83.512A (ICD-10-CM) - Rupture of anterior cruciate ligament of left knee, initial encounter   S83.422A (ICD-10-CM) - Tear of lateral collateral ligament of left knee, initial encounter   S83.8X2D (ICD-10-CM) - Acute medial meniscal injury of left knee, subsequent encounter      Evaluation Date: 10/24/2024  Re-evaluation date: 12/13/2024  Authorization Period Expiration: 12/31/25  Plan of Care Expiration: 12/31/25  Visit # / Visits authorized: 21/ 20   FOTO: 1/3     Time In: 130 pm  Time Out: 2:30 pm  Total Billable Time: 60 minutes     Precautions: Standard, post op  PROCEDURE PERFORMED:   Left ACL reconstruction with quadriceps tendon autograft   Left posterolateral corner reconstruction with allograft  Left knee lateral extra-articular tenodesis  Left knee arthroscopic medial meniscus repair   Left knee arthroscopic lateral meniscus repair   Left knee arthroscopic synovectomy   Left knee arthroscopic lysis of adhesions   Left knee open peroneal nerve neurolysis      SUBJECTIVE     Pt reports:going to the gym and just feeling weakness in knee.   She was compliant with home exercise program.  Response to previous treatment: none  Functional change:  on going    Pain: not quantified/10  Location: left knee      OBJECTIVE   3/12/25    L knee ROM:  5-0-115 degrees    Treatment       Felicity received the treatments listed below:      Therapeutic exercises to develop strength, endurance, ROM, posture, and core stabilization for 60 minutes including:  Rogue x 10 min (bike today)  Quad series full foam #2,1/2 foam #2, towel roll #0  LAQ off EOT 3x10 to tempo  Sit to stand 20" box  DL 3x10  Sled triple extension " "#35 3 laps  Shuttle SideLying hip ext #12 5x6      NP:  SLR at EOT 3x15 2#   Lateral walking x2 short laps  SLS on L 3x30 sec hold  Shuttle dbl 87.5# 3x10  Bridges 3x10-np  QS multi level (full boster, rolled towel, no towel) #1.5 3x10-np  ROM check-np  Prone quad stretch 10x10"-np  Patellar/fat pad mobs multi angle-np  Knee flex off EOT with PT-NP  Inferior patella mobs in flexion on table -np  DL heel raises 3x12 -np  *BILLED PER MEDICAID GUIDELINES*      Patient Education and Home Exercises     Home Exercises Provided and Patient Education Provided     Education provided:   - cont HEP, heel slides with brace donned    Written Home Exercises Provided: Patient instructed to cont prior HEP. Exercises were reviewed and Felicity was able to demonstrate them prior to the end of the session.  Felicity demonstrated good  understanding of the education provided. See EMR under Patient Instructions for exercises provided during therapy sessions    ASSESSMENT     Felicity achieved 115 degrees of flexion today which equals her contralateral side. Still shows quad weakness with not being able to perform active hyperextension. Educated about how to load self at gym with using RPE scale of 6-8/10. Quad fatigue at end of session. Pt was able to walk around clinic today without crutch and without knee giving out.     Felicity Is progressing well towards her goals.     Pt prognosis is Good.     Pt will continue to benefit from skilled outpatient physical therapy to address the deficits listed in the problem list box on initial evaluation, provide pt/family education and to maximize pt's level of independence in the home and community environment.     Pt's spiritual, cultural and educational needs considered and pt agreeable to plan of care and goals.     Anticipated barriers to physical therapy: none    Goals:   Short Term Goals: 6 weeks Post op  - Pt will increase ROM to +5 L knee hyperextension, 90 deg flex L knee (Progressing, " not met)  - Decrease Pain to 3/10 as worst with all PT interventions (Progressing, not met)  - Pt to self correct posture and gait with no AD or brace and minimal cues (Progressing, not met)  - Pt independent with HEP with progressions. (Met)     Long Term Goals ( 1 year):(Progressing, not met)  - Pt will increase ROM to 0 deg L knee hyperextension and 110 deg flexion  - Pt will increase strength to 5/5 L knee in all planes  - Decrease Pain to 0/10 as worst with stairs, walking and lifting weights  - Pt to return to 80% PLOF    PLAN     Plan of care Certification: 12/13/2024 to 12/31/25.     Outpatient Physical Therapy 2 times weekly for 52 weeks to include the following interventions: Gait Training, Manual Therapy, Moist Heat/ Ice, Neuromuscular Re-ed, Patient Education, Therapeutic Activities, and Therapeutic Exercise.    Edwina Kelly PTA,

## 2025-03-28 ENCOUNTER — CLINICAL SUPPORT (OUTPATIENT)
Dept: REHABILITATION | Facility: HOSPITAL | Age: 25
End: 2025-03-28
Payer: MEDICAID

## 2025-03-28 DIAGNOSIS — M25.562 ACUTE PAIN OF LEFT KNEE: Primary | ICD-10-CM

## 2025-03-28 PROCEDURE — 97110 THERAPEUTIC EXERCISES: CPT

## 2025-03-28 NOTE — PROGRESS NOTES
[facility]   Physical Therapy Treatment Note     Name: Felicity Cohen  Clinic Number: [id number]    Therapy Diagnosis:   Encounter Diagnosis   Name Primary?    Acute pain of left knee Yes             Physician: Jhony Berry MD    Visit Date: 3/28/2025    Physician Orders: PT Eval and Treat   Medical Diagnosis from Referral:   S83.512A (ICD-10-CM) - Rupture of anterior cruciate ligament of left knee, initial encounter   S83.422A (ICD-10-CM) - Tear of lateral collateral ligament of left knee, initial encounter   S83.8X2D (ICD-10-CM) - Acute medial meniscal injury of left knee, subsequent encounter      Evaluation Date: 10/24/2024  Re-evaluation date: 12/13/2024  Authorization Period Expiration: 12/31/25  Plan of Care Expiration: 12/31/25  Visit # / Visits authorized: 22/44  FOTO: 1/3     Time In: 110 pm  Time Out: 2:00 pm  Total Billable Time: 50 minutes     Precautions: Standard, post op  PROCEDURE PERFORMED:   Left ACL reconstruction with quadriceps tendon autograft   Left posterolateral corner reconstruction with allograft  Left knee lateral extra-articular tenodesis  Left knee arthroscopic medial meniscus repair   Left knee arthroscopic lateral meniscus repair   Left knee arthroscopic synovectomy   Left knee arthroscopic lysis of adhesions   Left knee open peroneal nerve neurolysis      SUBJECTIVE     Pt reports:slipped a little yesterday and was just worried she might have flared something up, so taking it easy today.    She was compliant with home exercise program.  Response to previous treatment: none  Functional change:  on going    Pain: not quantified/10  Location: left knee      OBJECTIVE   3/12/25    L knee ROM:  5-0-115 degrees    Treatment       Felicity received the treatments listed below:      Therapeutic exercises to develop strength, endurance, ROM, posture, and core stabilization for 50 minutes including:  Rogue x 10 min (bike today)  Quad series full foam #2,1/2  "foam #2, towel roll #0  LAQ off EOT 3x10 to tempo  Sit to stand 20" box  DL 3x10  Sled triple extension #35 3 laps  Shuttle SideLying hip ext #12 5x6  Shuttle dbl 87.5# 3x10  DL heel raises 3x12  SLS on L 3x30 sec hold    NP:  SLR at EOT 3x15 2#   Lateral walking x2 short laps  Bridges 3x10-np  QS multi level (full boster, rolled towel, no towel) #1.5 3x10-np    Inferior patella mobs in flexion on table -np   -np  *BILLED PER MEDICAID GUIDELINES*      Patient Education and Home Exercises     Home Exercises Provided and Patient Education Provided     Education provided:   - cont HEP, heel slides with brace donned    Written Home Exercises Provided: Patient instructed to cont prior HEP. Exercises were reviewed and Felicity was able to demonstrate them prior to the end of the session.  Felicity demonstrated good  understanding of the education provided. See EMR under Patient Instructions for exercises provided during therapy sessions    ASSESSMENT     Felicity was able to maintain all prior interventions well despite possible flare up, so treatment was intentionally not progressed. Will continue working functional strengthening and ROM as tolerated.     Felicity Is progressing well towards her goals.     Pt prognosis is Good.     Pt will continue to benefit from skilled outpatient physical therapy to address the deficits listed in the problem list box on initial evaluation, provide pt/family education and to maximize pt's level of independence in the home and community environment.     Pt's spiritual, cultural and educational needs considered and pt agreeable to plan of care and goals.     Anticipated barriers to physical therapy: none    Goals:   Short Term Goals: 6 weeks Post op  - Pt will increase ROM to +5 L knee hyperextension, 90 deg flex L knee (Progressing, not met)  - Decrease Pain to 3/10 as worst with all PT interventions (Progressing, not met)  - Pt to self correct posture and gait with no AD or brace and " minimal cues (Progressing, not met)  - Pt independent with HEP with progressions. (Met)     Long Term Goals ( 1 year):(Progressing, not met)  - Pt will increase ROM to 0 deg L knee hyperextension and 110 deg flexion  - Pt will increase strength to 5/5 L knee in all planes  - Decrease Pain to 0/10 as worst with stairs, walking and lifting weights  - Pt to return to 80% PLOF    PLAN     Plan of care Certification: 12/13/2024 to 12/31/25.     Outpatient Physical Therapy 2 times weekly for 52 weeks to include the following interventions: Gait Training, Manual Therapy, Moist Heat/ Ice, Neuromuscular Re-ed, Patient Education, Therapeutic Activities, and Therapeutic Exercise.    Deborah Coe, PT,

## 2025-03-29 NOTE — PROGRESS NOTES
"  Subjective:       Felicity Cohen is a 24 y.o. female who is an established patient who was referred by Rosalind Purdy  for evaluation of bladder issues.      Seen in the ER previously with dysuria x 1 week. Initially given Macrobid x 5d without full relief. Then given Pyridium at next ER visit. PCP then gave Cipro x 14d. Repeat ER visit for something else, given Keflex. Symptoms have now resolved. She reports occasional mild dysuria.    CT RSS 7/24 - no stones/hydro.     PVR (bladder scan) today - 83cc    4/3/2025  Returns now with c/o WBC in urine seen recently at urgent care. She did not have any urinary symptoms at that time. Given abx, did not take as she was asymptomatic. UA clear today. No symptoms of UTI today.     UCx:  7/3/24 - neg  7/10/24 - neg      The following portions of the patient's history were reviewed and updated as appropriate: allergies, current medications, past family history, past medical history, past social history, past surgical history and problem list.    Review of Systems  12 point review of systems completed. Pertinent positive and negatives listed in HPI        Objective:    Vitals: Ht 5' 5" (1.651 m)   Wt (!) 155.6 kg (343 lb 0.6 oz)   BMI 57.08 kg/m²     Physical Exam   General: well developed, well nourished in no acute distress  Head: normocephalic, atraumatic  Neck: no obvious enlargement of thyroid  HEENT: EOMI, mucus membranes moist, sclera anicteric, no hearing impairment  Lungs: symmetric expansion, non-labored breathing  Neuro: alert and oriented x 3, no gross deficits  Psych: normal judgment and insight, normal mood/affect and non-anxious  Genitourinary:   deferred      Lab Review   Urine analysis today in clinic shows positive for trace protein. No RBCs or LE.     Lab Results   Component Value Date    WBC 8.6 01/25/2022    WBC 7.8 03/10/2020    HGB 12.2 01/25/2022    HGB 12.7 03/10/2020    HCT 39.3 01/25/2022    HCT 40.1 03/10/2020    MCV 87 01/25/2022    MCV 85 " 03/10/2020     01/25/2022     03/10/2020     Lab Results   Component Value Date    CREATININE 0.66 06/28/2024    CREATININE 0.62 01/25/2022    BUN 12.0 06/28/2024    BUN 8 01/25/2022       Imaging  Images and reports were personally reviewed by me and discussed with patient  CT RSS reviewed       Assessment/Plan:      1. Dysuria    - Recent UTI symptoms. Given multiple rounds of abx. Initial UCx negative.   - Symptoms have now resolved   - UA clear   - No further workup necessary at this time     2. Pyuria   - Noted at urgent care   - Asymptomatic   - UA clear today    - Agree with no abx for this      Follow up PRN

## 2025-04-01 ENCOUNTER — TELEPHONE (OUTPATIENT)
Dept: SPORTS MEDICINE | Facility: CLINIC | Age: 25
End: 2025-04-01
Payer: MEDICAID

## 2025-04-01 NOTE — TELEPHONE ENCOUNTER
Spoke to patient.  Appointment rescheduled from 4/17/25 to 4/24/25 due to Dr. Berry being in surgery.

## 2025-04-02 ENCOUNTER — CLINICAL SUPPORT (OUTPATIENT)
Dept: REHABILITATION | Facility: HOSPITAL | Age: 25
End: 2025-04-02
Payer: MEDICAID

## 2025-04-02 DIAGNOSIS — M25.562 ACUTE PAIN OF LEFT KNEE: Primary | ICD-10-CM

## 2025-04-02 PROCEDURE — 97110 THERAPEUTIC EXERCISES: CPT | Mod: CQ

## 2025-04-02 NOTE — PROGRESS NOTES
[facility]   Physical Therapy Treatment Note     Name: Felicity Cohen  Clinic Number: [id number]    Therapy Diagnosis:   Encounter Diagnosis   Name Primary?    Acute pain of left knee Yes             Physician: Jhony Berry MD    Visit Date: 4/2/2025    Physician Orders: PT Eval and Treat   Medical Diagnosis from Referral:   S83.512A (ICD-10-CM) - Rupture of anterior cruciate ligament of left knee, initial encounter   S83.422A (ICD-10-CM) - Tear of lateral collateral ligament of left knee, initial encounter   S83.8X2D (ICD-10-CM) - Acute medial meniscal injury of left knee, subsequent encounter      Evaluation Date: 10/24/2024  Re-evaluation date: 12/13/2024  Authorization Period Expiration: 12/31/25  Plan of Care Expiration: 12/31/25  Visit # / Visits authorized: 23/44  FOTO: 1/3     Time In: 1:00 pm  Time Out: 2:00 pm  Total Billable Time: 60 minutes   PT tech extender used this session    Precautions: Standard, post op  PROCEDURE PERFORMED:   Left ACL reconstruction with quadriceps tendon autograft   Left posterolateral corner reconstruction with allograft  Left knee lateral extra-articular tenodesis  Left knee arthroscopic medial meniscus repair   Left knee arthroscopic lateral meniscus repair   Left knee arthroscopic synovectomy   Left knee arthroscopic lysis of adhesions   Left knee open peroneal nerve neurolysis      SUBJECTIVE     Pt reports: doing good with going to the gym.  She was compliant with home exercise program.  Response to previous treatment: none  Functional change:  on going    Pain: not quantified/10  Location: left knee      OBJECTIVE   3/12/25    L knee ROM:  5-0-115 degrees    Treatment       Felicity received the treatments listed below:      Therapeutic exercises to develop strength, endurance, ROM, posture, and core stabilization for 60 minutes including:  Rogue x 10 min (bike today)  Quad series full foam #5,1/2 foam #5, towel roll #0  LAQ #4 3x10  Sit  "to stand 20" box  L bias 3x10  Sled push #45 3 laps  Shuttle SideLying hip ext #12 5x6  Shuttle dbl 87.5# 3x10      NP:  DL heel raises 3x12  SLS on L 3x30 sec hold  SLR at EOT 3x15 2#   Lateral walking x2 short laps  Bridges 3x10-np  QS multi level (full boster, rolled towel, no towel) #1.5 3x10-np    Inferior patella mobs in flexion on table -np   -np  *BILLED PER MEDICAID GUIDELINES*      Patient Education and Home Exercises     Home Exercises Provided and Patient Education Provided     Education provided:   - cont HEP, heel slides with brace donned    Written Home Exercises Provided: Patient instructed to cont prior HEP. Exercises were reviewed and Felicity was able to demonstrate them prior to the end of the session.  Felicity demonstrated good  understanding of the education provided. See EMR under Patient Instructions for exercises provided during therapy sessions    ASSESSMENT     Felicity was able to progress as listed above.Still unable to actively hyperextend. Attempted to perform sled pulls but not strong enough to perform. Continue to focus on LE strengthening. Pt is weaning more from crutch in public.    Felicity Is progressing well towards her goals.     Pt prognosis is Good.     Pt will continue to benefit from skilled outpatient physical therapy to address the deficits listed in the problem list box on initial evaluation, provide pt/family education and to maximize pt's level of independence in the home and community environment.     Pt's spiritual, cultural and educational needs considered and pt agreeable to plan of care and goals.     Anticipated barriers to physical therapy: none    Goals:   Short Term Goals: 6 weeks Post op  - Pt will increase ROM to +5 L knee hyperextension, 90 deg flex L knee (Progressing, not met)  - Decrease Pain to 3/10 as worst with all PT interventions (Progressing, not met)  - Pt to self correct posture and gait with no AD or brace and minimal cues (Progressing, not " met)  - Pt independent with HEP with progressions. (Met)     Long Term Goals ( 1 year):(Progressing, not met)  - Pt will increase ROM to 0 deg L knee hyperextension and 110 deg flexion  - Pt will increase strength to 5/5 L knee in all planes  - Decrease Pain to 0/10 as worst with stairs, walking and lifting weights  - Pt to return to 80% PLOF    PLAN     Plan of care Certification: 12/13/2024 to 12/31/25.     Outpatient Physical Therapy 2 times weekly for 52 weeks to include the following interventions: Gait Training, Manual Therapy, Moist Heat/ Ice, Neuromuscular Re-ed, Patient Education, Therapeutic Activities, and Therapeutic Exercise.    Edwina Kelly PTA,

## 2025-04-03 ENCOUNTER — OFFICE VISIT (OUTPATIENT)
Dept: UROLOGY | Facility: CLINIC | Age: 25
End: 2025-04-03
Payer: MEDICAID

## 2025-04-03 VITALS — HEIGHT: 65 IN | BODY MASS INDEX: 48.82 KG/M2 | WEIGHT: 293 LBS

## 2025-04-03 DIAGNOSIS — R30.0 DYSURIA: ICD-10-CM

## 2025-04-03 DIAGNOSIS — R82.81 PYURIA: Primary | ICD-10-CM

## 2025-04-03 PROCEDURE — 3008F BODY MASS INDEX DOCD: CPT | Mod: CPTII,,, | Performed by: UROLOGY

## 2025-04-03 PROCEDURE — 1160F RVW MEDS BY RX/DR IN RCRD: CPT | Mod: CPTII,,, | Performed by: UROLOGY

## 2025-04-03 PROCEDURE — 99213 OFFICE O/P EST LOW 20 MIN: CPT | Mod: S$PBB,,, | Performed by: UROLOGY

## 2025-04-03 PROCEDURE — 1159F MED LIST DOCD IN RCRD: CPT | Mod: CPTII,,, | Performed by: UROLOGY

## 2025-04-03 PROCEDURE — 99999 PR PBB SHADOW E&M-EST. PATIENT-LVL III: CPT | Mod: PBBFAC,,, | Performed by: UROLOGY

## 2025-04-03 PROCEDURE — 99213 OFFICE O/P EST LOW 20 MIN: CPT | Mod: PBBFAC | Performed by: UROLOGY

## 2025-04-04 ENCOUNTER — CLINICAL SUPPORT (OUTPATIENT)
Dept: REHABILITATION | Facility: HOSPITAL | Age: 25
End: 2025-04-04
Payer: MEDICAID

## 2025-04-04 DIAGNOSIS — M25.562 ACUTE PAIN OF LEFT KNEE: Primary | ICD-10-CM

## 2025-04-04 PROCEDURE — 97110 THERAPEUTIC EXERCISES: CPT

## 2025-04-04 NOTE — PROGRESS NOTES
[facility]   Physical Therapy Treatment Note     Name: Felicity Cohen  Clinic Number: [id number]    Therapy Diagnosis:   Encounter Diagnosis   Name Primary?    Acute pain of left knee Yes       Physician: Jhony Berry MD    Visit Date: 4/4/2025    Physician Orders: PT Eval and Treat   Medical Diagnosis from Referral:   S83.512A (ICD-10-CM) - Rupture of anterior cruciate ligament of left knee, initial encounter   S83.422A (ICD-10-CM) - Tear of lateral collateral ligament of left knee, initial encounter   S83.8X2D (ICD-10-CM) - Acute medial meniscal injury of left knee, subsequent encounter      Evaluation Date: 10/24/2024  Re-evaluation date: 12/13/2024  Authorization Period Expiration: 12/31/25  Plan of Care Expiration: 12/31/25  Visit # / Visits authorized: 24/44  FOTO: 1/3     Time In: 1:00 pm  Time Out: 150 pm  Total Billable Time: 50 minutes   PT tech extender used this session    Precautions: Standard, post op  PROCEDURE PERFORMED:   Left ACL reconstruction with quadriceps tendon autograft   Left posterolateral corner reconstruction with allograft  Left knee lateral extra-articular tenodesis  Left knee arthroscopic medial meniscus repair   Left knee arthroscopic lateral meniscus repair   Left knee arthroscopic synovectomy   Left knee arthroscopic lysis of adhesions   Left knee open peroneal nerve neurolysis      SUBJECTIVE     Pt reports: no pain in knee, walking without crutch.   She was compliant with home exercise program.  Response to previous treatment: none  Functional change:  on going    Pain: not quantified/10  Location: left knee      OBJECTIVE   3/12/25    L knee ROM:  5-0-115 degrees    Treatment       Felicity received the treatments listed below:      Therapeutic exercises to develop strength, endurance, ROM, posture, and core stabilization for 50 minutes including:  Rogue x 10 min (bike today)  Quad series full foam #5,1/2 foam #5, towel roll #0  LAQ 3x fatigue  "on hammer  Sit to stand 20" box  L bias 3x10-np  Sled push #45 3 laps  Shuttle SideLying hip ext #12 5x6  Shuttle dbl 87.5# 3x10      NP:  DL heel raises 3x12  SLS on L 3x30 sec hold  SLR at EOT 3x15 2#   Lateral walking x2 short laps  Bridges 3x10-np  QS multi level (full boster, rolled towel, no towel) #1.5 3x10-np    Inferior patella mobs in flexion on table -np   -np  *BILLED PER MEDICAID GUIDELINES*      Patient Education and Home Exercises     Home Exercises Provided and Patient Education Provided     Education provided:   - cont HEP, heel slides with brace donned    Written Home Exercises Provided: Patient instructed to cont prior HEP. Exercises were reviewed and Felicity was able to demonstrate them prior to the end of the session.  Felicity demonstrated good  understanding of the education provided. See EMR under Patient Instructions for exercises provided during therapy sessions    ASSESSMENT     Felicity had difficulty achieving TKE on hammer for LAQ so only performed until fatigue. Pt continues to be appropriately challenged to get necessary strength gains to achieve functional goals. Will continue progressing as tolerated.     Felicity Is progressing well towards her goals.     Pt prognosis is Good.     Pt will continue to benefit from skilled outpatient physical therapy to address the deficits listed in the problem list box on initial evaluation, provide pt/family education and to maximize pt's level of independence in the home and community environment.     Pt's spiritual, cultural and educational needs considered and pt agreeable to plan of care and goals.     Anticipated barriers to physical therapy: none    Goals:   Short Term Goals: 6 weeks Post op  - Pt will increase ROM to +5 L knee hyperextension, 90 deg flex L knee (Progressing, not met)  - Decrease Pain to 3/10 as worst with all PT interventions (Progressing, not met)  - Pt to self correct posture and gait with no AD or brace and minimal cues " (Progressing, not met)  - Pt independent with HEP with progressions. (Met)     Long Term Goals ( 1 year):(Progressing, not met)  - Pt will increase ROM to 0 deg L knee hyperextension and 110 deg flexion  - Pt will increase strength to 5/5 L knee in all planes  - Decrease Pain to 0/10 as worst with stairs, walking and lifting weights  - Pt to return to 80% PLOF    PLAN     Plan of care Certification: 12/13/2024 to 12/31/25.     Outpatient Physical Therapy 2 times weekly for 52 weeks to include the following interventions: Gait Training, Manual Therapy, Moist Heat/ Ice, Neuromuscular Re-ed, Patient Education, Therapeutic Activities, and Therapeutic Exercise.    Deborah Coe, PT,

## 2025-04-09 ENCOUNTER — CLINICAL SUPPORT (OUTPATIENT)
Dept: REHABILITATION | Facility: HOSPITAL | Age: 25
End: 2025-04-09
Payer: MEDICAID

## 2025-04-09 DIAGNOSIS — M25.562 ACUTE PAIN OF LEFT KNEE: Primary | ICD-10-CM

## 2025-04-09 PROCEDURE — 97110 THERAPEUTIC EXERCISES: CPT | Mod: CQ

## 2025-04-09 NOTE — PROGRESS NOTES
[facility]   Physical Therapy Treatment Note     Name: Felicity Cohen  Clinic Number: [id number]    Therapy Diagnosis:   Encounter Diagnosis   Name Primary?    Acute pain of left knee Yes       Physician: Jhony Berry MD    Visit Date: 4/9/2025    Physician Orders: PT Eval and Treat   Medical Diagnosis from Referral:   S83.512A (ICD-10-CM) - Rupture of anterior cruciate ligament of left knee, initial encounter   S83.422A (ICD-10-CM) - Tear of lateral collateral ligament of left knee, initial encounter   S83.8X2D (ICD-10-CM) - Acute medial meniscal injury of left knee, subsequent encounter      Evaluation Date: 10/24/2024  Re-evaluation date: 12/13/2024  Authorization Period Expiration: 12/31/25  Plan of Care Expiration: 12/31/25  Visit # / Visits authorized: 25/44  FOTO: 1/3     Time In: 1:00 pm  Time Out: 2:00 pm  Total Billable Time: 60 minutes   PT tech extender used this session    Precautions: Standard, post op  PROCEDURE PERFORMED:   Left ACL reconstruction with quadriceps tendon autograft   Left posterolateral corner reconstruction with allograft  Left knee lateral extra-articular tenodesis  Left knee arthroscopic medial meniscus repair   Left knee arthroscopic lateral meniscus repair   Left knee arthroscopic synovectomy   Left knee arthroscopic lysis of adhesions   Left knee open peroneal nerve neurolysis      SUBJECTIVE     Pt reports: knee is doing good  She was compliant with home exercise program.  Response to previous treatment: none  Functional change:  on going    Pain: not quantified/10  Location: left knee      OBJECTIVE   3/12/25    L knee ROM:  5-0-115 degrees    Treatment       Felicity received the treatments listed below:      Therapeutic exercises to develop strength, endurance, ROM, posture, and core stabilization for 60 minutes including:  Rogue x 10 min (bike today)  Quad series full foam #5,1/2 foam #5, towel roll #0  LAQ 3x fatigue on hammer  Sled push  "#45 3 laps  Step up 3" 3x10  DL heel raises 2x10  Standing off side SLR 3x10    NP:  Shuttle SideLying hip ext #12 5x6  Shuttle dbl 87.5# 3x10  Sit to stand 20" box  L bias 3x10-np  Inferior patella mobs in flexion on table -np   -np  *BILLED PER MEDICAID GUIDELINES*      Patient Education and Home Exercises     Home Exercises Provided and Patient Education Provided     Education provided:   - cont HEP, heel slides with brace donned    Written Home Exercises Provided: Patient instructed to cont prior HEP. Exercises were reviewed and Felicity was able to demonstrate them prior to the end of the session.  Felicity demonstrated good  understanding of the education provided. See EMR under Patient Instructions for exercises provided during therapy sessions    ASSESSMENT     Felicity was able to perform SLR off EOT without a lag. Still unable to actively hyperextend her knee. Showing improvements in quad strength but still weak compared to contralateral side. Look to tendeq next session    Felicity Is progressing well towards her goals.     Pt prognosis is Good.     Pt will continue to benefit from skilled outpatient physical therapy to address the deficits listed in the problem list box on initial evaluation, provide pt/family education and to maximize pt's level of independence in the home and community environment.     Pt's spiritual, cultural and educational needs considered and pt agreeable to plan of care and goals.     Anticipated barriers to physical therapy: none    Goals:   Short Term Goals: 6 weeks Post op  - Pt will increase ROM to +5 L knee hyperextension, 90 deg flex L knee (Progressing, not met)  - Decrease Pain to 3/10 as worst with all PT interventions (Progressing, not met)  - Pt to self correct posture and gait with no AD or brace and minimal cues (Progressing, not met)  - Pt independent with HEP with progressions. (Met)     Long Term Goals ( 1 year):(Progressing, not met)  - Pt will increase ROM to 0 " deg L knee hyperextension and 110 deg flexion  - Pt will increase strength to 5/5 L knee in all planes  - Decrease Pain to 0/10 as worst with stairs, walking and lifting weights  - Pt to return to 80% PLOF    PLAN     Plan of care Certification: 12/13/2024 to 12/31/25.     Outpatient Physical Therapy 2 times weekly for 52 weeks to include the following interventions: Gait Training, Manual Therapy, Moist Heat/ Ice, Neuromuscular Re-ed, Patient Education, Therapeutic Activities, and Therapeutic Exercise.    Edwina Kelly PTA,

## 2025-04-11 ENCOUNTER — CLINICAL SUPPORT (OUTPATIENT)
Dept: REHABILITATION | Facility: HOSPITAL | Age: 25
End: 2025-04-11
Payer: MEDICAID

## 2025-04-11 DIAGNOSIS — M25.562 ACUTE PAIN OF LEFT KNEE: Primary | ICD-10-CM

## 2025-04-11 PROCEDURE — 97110 THERAPEUTIC EXERCISES: CPT | Mod: CQ

## 2025-04-11 NOTE — PROGRESS NOTES
"[facility]   Physical Therapy Treatment Note     Name: Felicity Cohen  Clinic Number: [id number]    Therapy Diagnosis:   No diagnosis found.      Physician: Jhony Berry MD    Visit Date: 4/11/2025    Physician Orders: PT Eval and Treat   Medical Diagnosis from Referral:   S83.512A (ICD-10-CM) - Rupture of anterior cruciate ligament of left knee, initial encounter   S83.422A (ICD-10-CM) - Tear of lateral collateral ligament of left knee, initial encounter   S83.8X2D (ICD-10-CM) - Acute medial meniscal injury of left knee, subsequent encounter      Evaluation Date: 10/24/2024  Re-evaluation date: 12/13/2024  Authorization Period Expiration: 12/31/25  Plan of Care Expiration: 12/31/25  Visit # / Visits authorized: 26/44  FOTO: 1/3     Time In: 1:00 pm  Time Out: 2:00 pm  Total Billable Time: 60 minutes   PT tech extender used this session    Precautions: Standard, post op  PROCEDURE PERFORMED:   Left ACL reconstruction with quadriceps tendon autograft   Left posterolateral corner reconstruction with allograft  Left knee lateral extra-articular tenodesis  Left knee arthroscopic medial meniscus repair   Left knee arthroscopic lateral meniscus repair   Left knee arthroscopic synovectomy   Left knee arthroscopic lysis of adhesions   Left knee open peroneal nerve neurolysis      SUBJECTIVE     Pt reports: knee is doing good  She was compliant with home exercise program.  Response to previous treatment: none  Functional change:  on going    Pain: not quantified/10  Location: left knee      OBJECTIVE   3/12/25    L knee ROM:  5-0-115 degrees    Treatment       Felicity received the treatments listed below:      Therapeutic exercises to develop strength, endurance, ROM, posture, and core stabilization for 60 minutes including:  Rogue x 10 min (bike today)  Quad series full foam #5,1/2 foam #5, towel roll #0  LAQ 4x8 #5  Sidlelying shuttle hip ext #89  Step up 4" 3x10  Standing off side SLR " "3x10    NP:  Sled push #45 3 laps  DL heel raises 2x10  Shuttle SideLying hip ext #12 5x6  Shuttle dbl 87.5# 3x10  Sit to stand 20" box  L bias 3x10-np  Inferior patella mobs in flexion on table -np   -np  *BILLED PER MEDICAID GUIDELINES*      Patient Education and Home Exercises     Home Exercises Provided and Patient Education Provided     Education provided:   - cont HEP, heel slides with brace donned    Written Home Exercises Provided: Patient instructed to cont prior HEP. Exercises were reviewed and Felicity was able to demonstrate them prior to the end of the session.  Felicity demonstrated good  understanding of the education provided. See EMR under Patient Instructions for exercises provided during therapy sessions    ASSESSMENT     Felicity ambulated into her session with a limp but states she is not having pain. Slightly restricted into hyperextension upon arrival. Was able to add load to LAQ today with noted fatigue. Pt encouraged to start walking and given a schedule of how to progress. Continue to work on LE strength to improve tolerance of prolong walking and to clean up gait.     Felicity Is progressing well towards her goals.     Pt prognosis is Good.     Pt will continue to benefit from skilled outpatient physical therapy to address the deficits listed in the problem list box on initial evaluation, provide pt/family education and to maximize pt's level of independence in the home and community environment.     Pt's spiritual, cultural and educational needs considered and pt agreeable to plan of care and goals.     Anticipated barriers to physical therapy: none    Goals:   Short Term Goals: 6 weeks Post op  - Pt will increase ROM to +5 L knee hyperextension, 90 deg flex L knee (Progressing, not met)  - Decrease Pain to 3/10 as worst with all PT interventions (Progressing, not met)  - Pt to self correct posture and gait with no AD or brace and minimal cues (Progressing, not met)  - Pt independent with " HEP with progressions. (Met)     Long Term Goals ( 1 year):(Progressing, not met)  - Pt will increase ROM to 0 deg L knee hyperextension and 110 deg flexion  - Pt will increase strength to 5/5 L knee in all planes  - Decrease Pain to 0/10 as worst with stairs, walking and lifting weights  - Pt to return to 80% PLOF    PLAN     Plan of care Certification: 12/13/2024 to 12/31/25.     Outpatient Physical Therapy 2 times weekly for 52 weeks to include the following interventions: Gait Training, Manual Therapy, Moist Heat/ Ice, Neuromuscular Re-ed, Patient Education, Therapeutic Activities, and Therapeutic Exercise.    Edwina eKlly PTA,

## 2025-04-16 ENCOUNTER — CLINICAL SUPPORT (OUTPATIENT)
Dept: REHABILITATION | Facility: HOSPITAL | Age: 25
End: 2025-04-16
Payer: MEDICAID

## 2025-04-16 DIAGNOSIS — M25.562 ACUTE PAIN OF LEFT KNEE: Primary | ICD-10-CM

## 2025-04-16 PROCEDURE — 97110 THERAPEUTIC EXERCISES: CPT

## 2025-04-16 NOTE — PROGRESS NOTES
[facility]   Physical Therapy Treatment Note     Name: Felicity Cohen  Clinic Number: [id number]    Therapy Diagnosis:   Encounter Diagnosis   Name Primary?    Acute pain of left knee Yes         Physician: Jhony Berry MD    Visit Date: 4/16/2025    Physician Orders: PT Eval and Treat   Medical Diagnosis from Referral:   S83.512A (ICD-10-CM) - Rupture of anterior cruciate ligament of left knee, initial encounter   S83.422A (ICD-10-CM) - Tear of lateral collateral ligament of left knee, initial encounter   S83.8X2D (ICD-10-CM) - Acute medial meniscal injury of left knee, subsequent encounter      Evaluation Date: 10/24/2024  Re-evaluation date: 12/13/2024  Authorization Period Expiration: 12/31/25  Plan of Care Expiration: 12/31/25  Visit # / Visits authorized: 27/44  FOTO: 1/3     Time In: 1:40 pm  Time Out: 2:20pm  Total Billable Time: 40 minutes   PT tech extender used this session    Precautions: Standard, post op  PROCEDURE PERFORMED:   Left ACL reconstruction with quadriceps tendon autograft   Left posterolateral corner reconstruction with allograft  Left knee lateral extra-articular tenodesis  Left knee arthroscopic medial meniscus repair   Left knee arthroscopic lateral meniscus repair   Left knee arthroscopic synovectomy   Left knee arthroscopic lysis of adhesions   Left knee open peroneal nerve neurolysis      SUBJECTIVE     Pt reports:noticing she is limping a little more.   She was compliant with home exercise program.  Response to previous treatment: none  Functional change:  on going    Pain: not quantified/10  Location: left knee      OBJECTIVE   3/12/25    L knee ROM:  5-0-115 degrees    Treatment       Felicity received the treatments listed below:      Therapeutic exercises to develop strength, endurance, ROM, posture, and core stabilization for 40 minutes including:  Rogue x 10 min (bike today)  Quad series full foam #5,1/2 foam #5, towel roll #0-np  LAQ hammer  "4x8 #5  Sidlelying shuttle hip ext #89  Step up 4" 3x10-np  Standing off side SLR 3x10-np  Sled push #45 3 laps  DL heel raises 2x10-np  Shuttle SideLying hip ext #12 5x6  Shuttle dbl 87.5# 3x10  Sit to stand 20" box  L bias 3x10 15#  Inferior patella mobs in flexion on table -np  Matrix HS curls 15# 3x fatigue     *BILLED PER MEDICAID GUIDELINES*      Patient Education and Home Exercises     Home Exercises Provided and Patient Education Provided     Education provided:   - cont HEP, heel slides with brace donned    Written Home Exercises Provided: Patient instructed to cont prior HEP. Exercises were reviewed and Felicity was able to demonstrate them prior to the end of the session.  Felicity demonstrated good  understanding of the education provided. See EMR under Patient Instructions for exercises provided during therapy sessions    ASSESSMENT     Felicity was able to progress load for LAQ at [facility] with focus on endurance and strengthening while achieving TKE each rep. Some limping noted during treatment, but did not limit her. Plan to continue progressing quad loading to tolerance to achieve functional goals.     Felicity Is progressing well towards her goals.     Pt prognosis is Good.     Pt will continue to benefit from skilled outpatient physical therapy to address the deficits listed in the problem list box on initial evaluation, provide pt/family education and to maximize pt's level of independence in the home and community environment.     Pt's spiritual, cultural and educational needs considered and pt agreeable to plan of care and goals.     Anticipated barriers to physical therapy: none    Goals:   Short Term Goals: 6 weeks Post op  - Pt will increase ROM to +5 L knee hyperextension, 90 deg flex L knee (Progressing, not met)  - Decrease Pain to 3/10 as worst with all PT interventions (Progressing, not met)  - Pt to self correct posture and gait with no AD or brace and minimal cues (Progressing, " not met)  - Pt independent with HEP with progressions. (Met)     Long Term Goals ( 1 year):(Progressing, not met)  - Pt will increase ROM to 0 deg L knee hyperextension and 110 deg flexion  - Pt will increase strength to 5/5 L knee in all planes  - Decrease Pain to 0/10 as worst with stairs, walking and lifting weights  - Pt to return to 80% PLOF    PLAN     Plan of care Certification: 12/13/2024 to 12/31/25.     Outpatient Physical Therapy 2 times weekly for 52 weeks to include the following interventions: Gait Training, Manual Therapy, Moist Heat/ Ice, Neuromuscular Re-ed, Patient Education, Therapeutic Activities, and Therapeutic Exercise.    Deborah Coe, PT,

## 2025-04-24 ENCOUNTER — OFFICE VISIT (OUTPATIENT)
Dept: SPORTS MEDICINE | Facility: CLINIC | Age: 25
End: 2025-04-24
Payer: MEDICAID

## 2025-04-24 VITALS
HEART RATE: 114 BPM | WEIGHT: 293 LBS | SYSTOLIC BLOOD PRESSURE: 113 MMHG | HEIGHT: 65 IN | BODY MASS INDEX: 48.82 KG/M2 | DIASTOLIC BLOOD PRESSURE: 81 MMHG

## 2025-04-24 DIAGNOSIS — S83.512D RUPTURE OF ANTERIOR CRUCIATE LIGAMENT OF LEFT KNEE, SUBSEQUENT ENCOUNTER: Primary | ICD-10-CM

## 2025-04-24 DIAGNOSIS — S89.92XD INJURY OF POSTEROLATERAL CORNER OF LEFT KNEE, SUBSEQUENT ENCOUNTER: ICD-10-CM

## 2025-04-24 PROCEDURE — 99213 OFFICE O/P EST LOW 20 MIN: CPT | Mod: PBBFAC | Performed by: STUDENT IN AN ORGANIZED HEALTH CARE EDUCATION/TRAINING PROGRAM

## 2025-04-24 PROCEDURE — 3079F DIAST BP 80-89 MM HG: CPT | Mod: CPTII,,, | Performed by: STUDENT IN AN ORGANIZED HEALTH CARE EDUCATION/TRAINING PROGRAM

## 2025-04-24 PROCEDURE — 1159F MED LIST DOCD IN RCRD: CPT | Mod: CPTII,,, | Performed by: STUDENT IN AN ORGANIZED HEALTH CARE EDUCATION/TRAINING PROGRAM

## 2025-04-24 PROCEDURE — 1160F RVW MEDS BY RX/DR IN RCRD: CPT | Mod: CPTII,,, | Performed by: STUDENT IN AN ORGANIZED HEALTH CARE EDUCATION/TRAINING PROGRAM

## 2025-04-24 PROCEDURE — 99999 PR PBB SHADOW E&M-EST. PATIENT-LVL III: CPT | Mod: PBBFAC,,, | Performed by: STUDENT IN AN ORGANIZED HEALTH CARE EDUCATION/TRAINING PROGRAM

## 2025-04-24 PROCEDURE — 3074F SYST BP LT 130 MM HG: CPT | Mod: CPTII,,, | Performed by: STUDENT IN AN ORGANIZED HEALTH CARE EDUCATION/TRAINING PROGRAM

## 2025-04-24 PROCEDURE — 99213 OFFICE O/P EST LOW 20 MIN: CPT | Mod: S$PBB,,, | Performed by: STUDENT IN AN ORGANIZED HEALTH CARE EDUCATION/TRAINING PROGRAM

## 2025-04-24 PROCEDURE — 3008F BODY MASS INDEX DOCD: CPT | Mod: CPTII,,, | Performed by: STUDENT IN AN ORGANIZED HEALTH CARE EDUCATION/TRAINING PROGRAM

## 2025-04-24 NOTE — PROGRESS NOTES
Subjective:     Chief Complaint: Felicity Cohen is a 24 y.o. female who had concerns including Pain of the Left Knee and Follow-up.    HPI    HPI 04/24/2025  History of Present Illness    CHIEF COMPLAINT:  - Left knee follow-up and new right knee pain.    HPI:  Client presents for follow-up regarding her left knee, approximately 4-5 months post-operative. Her left knee motion is good, and she can straighten the left leg out, though not to full hyperextension. Walking feels fine and the knee feels stable. However, she notes some weakness in the quadriceps, causing the knee to occasionally buckle. She is currently undergoing PT.    She reports new onset of pain in the right knee, describing it as an aching pain in the center of the knee. This pain occurred while walking and standing up the previous day. It is also present when going up and down stairs, and when transitioning from sitting to standing.    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 She has been going to the park with her mother and is attempting to increase her daily step count.    PREVIOUS TREATMENTS:  - PT: Ongoing for left knee.    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 She reports overall improvement in her knee's condition, noting it feels different. She has been following post-operative protocols, including wearing a knee brace consistently, even during sleep. Client began 50% weight-bearing yesterday as instructed. She has been cautious during recent snowfall,  with family members discouraging crutch use outdoors.    She has been attending physical therapy sessions, working on regaining range of motion and strength. Client expresses some impatience with the recovery timeline, inquiring about the possibility of returning to school in August, approximately 7-8 months post-surgery. She asks about applying force during knee bending exercises, indicating she has been performing these independently without assistance from physical therapists.    Client denies any significant stiffness in the knee that would require immediate intervention.    PREVIOUS TREATMENTS:  - Client has been using crutches and wearing a knee brace, including while sleeping, for the past 6 weeks.  - Client started physical therapy, which has been ongoing.  - Client began putting 50% weight on the affected leg as of yesterday.    WORK STATUS:  - Student, inquiring about returning to school in August (7-8 months post-surgery)         PROCEDURE PERFORMED by Jhony Berry MD on 12/12/2024:   Left ACL reconstruction with quadriceps tendon autograft   Left posterolateral corner reconstruction with allograft  Left knee lateral extra-articular tenodesis  Left knee arthroscopic medial meniscus repair   Left knee arthroscopic lateral meniscus repair   Left knee arthroscopic synovectomy   Left knee arthroscopic lysis of adhesions   Left knee open peroneal nerve neurolysis    Past Medical History:   Diagnosis Date    ADHD (attention deficit hyperactivity disorder)     Anxiety     Constipation     Depression     GERD (gastroesophageal reflux disease)     IBS (irritable bowel syndrome)     Ovarian cyst     Schizophrenia in children        Current Outpatient Medications on File Prior to Visit   Medication Sig Dispense Refill    acetaminophen (TYLENOL) 325 MG tablet Take 325 mg by mouth every 6 (six) hours as needed.      biotin 1 mg tablet Take 1,000 mcg by mouth twice a week.      butalbital-acetaminophen-caff -40  mg Cap Take 1 capsule by mouth daily as needed.      celecoxib (CELEBREX) 200 MG capsule Take 1 capsule (200 mg total) by mouth 2 (two) times daily with meals. 60 capsule 0    cetirizine (ZYRTEC) 10 MG tablet Take 10 mg by mouth once daily.      ergocalciferol (ERGOCALCIFEROL) 50,000 unit Cap TAKE ONE CAPSULE BY MOUTH EVERY 7 DAYS 12 capsule 3    ergocalciferol (ERGOCALCIFEROL) 50,000 unit Cap Take 1 capsule (50,000 Units total) by mouth every 7 days. 12 capsule 3    Lactobacillus rhamnosus GG (CULTURELLE) 10 billion cell capsule Take 1 capsule by mouth once daily.      methocarbamoL (ROBAXIN) 500 MG Tab Take 1 tablet (500 mg total) by mouth 3 (three) times daily as needed (muscle spasms). 30 tablet 0    mupirocin (BACTROBAN) 2 % ointment Apply topically 3 (three) times daily. 15 g 0    omega-3/dha/epa/ala/vitamin D3 (OMEGA-3-DHA-EPA-ALA-VIT D3 ORAL) Take by mouth.      oxyCODONE (ROXICODONE) 5 MG immediate release tablet Take 1 tablet (5 mg total) by mouth every 6 (six) hours as needed for Pain. 21 tablet 0    polyethylene glycol (GLYCOLAX) 17 gram/dose powder Take 17 g by mouth once daily. 510 g 11    promethazine (PHENERGAN) 25 MG tablet Take 1 tablet (25 mg total) by mouth every 6 (six) hours as needed for Nausea. 30 tablet 0    triamcinolone acetonide 0.1% (KENALOG) 0.1 % ointment Apply topically 2 (two) times daily. To affected area 80 g 0    aspirin (ECOTRIN) 81 MG EC tablet Take 1 tablet (81 mg total) by mouth once daily. 42 tablet 0     No current facility-administered medications on file prior to visit.       Past Surgical History:   Procedure Laterality Date    ARTHROSCOPY,KNEE,WITH MENISCUS REPAIR Left 12/12/2024    Procedure: ARTHROSCOPY,KNEE,WITH MENISCUS REPAIR;  Surgeon: Jhony Berry MD;  Location: [facility] OR [address] [place] [address];  Service: Orthopedics;  Laterality: Left;    DECOMPRESSION OF NERVE Left 12/12/2024    Procedure: DECOMPRESSION, NERVE;  Surgeon: Jhony Berry MD;  [facility];   Service: Orthopedics;  Laterality: Left;    ESOPHAGOGASTRODUODENOSCOPY N/A 2/8/2022    Procedure: EGD (ESOPHAGOGASTRODUODENOSCOPY);  Surgeon: Berkley Mckenna MD;  Location: [facility];  Service: Endoscopy;  Laterality: N/A;    RECONSTRUCTION OF ANTERIOR CRUCIATE LIGAMENT USING GRAFT Left 12/12/2024    Procedure: RECONSTRUCTION, KNEE, ACL, QUAD TENDON AUTOGRAFT;  Surgeon: Jhony Berry MD;  Location: [address];  Service: Orthopedics;  Laterality: Left;    RECONSTRUCTION OF LIGAMENT Left 12/12/2024    Procedure: RECONSTRUCTION, POSTEROLATERAL CORNER (PLC) WITH ALLOGRAFT;  Surgeon: Jhony Berry MD;  Location: [facility] OR [address] [place] [address];  Service: Orthopedics;  Laterality: Left;    RECONSTRUCTION, LIGAMENT, KNEE, EXTRA-ARTICULAR Left 12/12/2024    Procedure: RECONSTRUCTION, LIGAMENT, KNEE, EXTRA-ARTICULAR;  Surgeon: Jhony Berry MD;  Location: [address];  Service: Orthopedics;  Laterality: Left;    SYNOVECTOMY, KNEE, LIMITED, ARTHROSCOPIC Left 12/12/2024    Procedure: SYNOVECTOMY, KNEE, LIMITED, ARTHROSCOPIC;  Surgeon: Jhony Berry MD;  Location: [address];  Service: Orthopedics;  Laterality: Left;    WISDOM TOOTH EXTRACTION         Family History   Problem Relation Name Age of Onset    Asthma Mother      Diabetes Father         Social History     Socioeconomic History    Marital status: Single   Tobacco Use    Smoking status: Never     Passive exposure: Yes    Smokeless tobacco: Never   Substance and Sexual Activity    Alcohol use: No    Drug use: Never    Sexual activity: Yes     Partners: Male       Review of Systems   Constitutional: Negative.   HENT: Negative.     Eyes: Negative.    Cardiovascular: Negative.    Respiratory: Negative.     Endocrine: Negative.    Hematologic/Lymphatic: Negative.    Skin: Negative.    Musculoskeletal:  Positive for muscle weakness. Negative for falls, joint pain, joint swelling, myalgias and stiffness.   Neurological: Negative.     Psychiatric/Behavioral: Negative.     Allergic/Immunologic: Negative.        Pain Related Questions  Over the past 3 days, what was your average pain during activity? (I.e. running, jogging, walking, climbing stairs, getting dressed, ect.): 4  Over the past 3 days, what was your highest pain level?: 5  Over the past 3 days, what was your lowest pain level? : 2    Other  How many nights a week are you awakened by your affected body part?: 0  Was the patient's HEIGHT measured or patient reported?: Patient Reported  Was the patient's WEIGHT measured or patient reported?: Measured    Objective:     General: Felicity MENJIVAR is well-developed, well-nourished, appears stated age, in no acute distress, alert and oriented to time, place and person.     General    Nursing note and vitals reviewed.  Constitutional: She is oriented to person, place, and time. She appears well-developed and well-nourished. No distress.   HENT:   Head: Normocephalic and atraumatic.   Nose: Nose normal.   Eyes: EOM are normal.   Cardiovascular:  Intact distal pulses.            Pulmonary/Chest: Effort normal. No respiratory distress.   Neurological: She is alert and oriented to person, place, and time.   Psychiatric: She has a normal mood and affect. Her behavior is normal. Judgment and thought content normal.     General Musculoskeletal Exam   Gait: normal       Right Knee Exam     Range of Motion   Extension:  -10   Flexion:  140     Left Knee Exam     Inspection   Erythema: absent  Scars: present  Swelling: absent  Effusion: absent  Deformity: absent  Bruising: absent    Range of Motion   Extension:  -5   Flexion:  130     Tests   Stability   Lachman: normal (-1 to 2mm)   PCL-Posterior Drawer: normal (0 to 2mm)  MCL - Valgus: normal (0 to 2mm)  LCL - Varus: normal (0 to 2mm)    Other   Sensation: normal    Muscle Strength   Left Lower Extremity   Quadriceps:  4/5   Hamstrin/5     Vascular Exam       Left Pulses  Dorsalis Pedis:       2+  Posterior Tibial:      2+        Edema  Left Lower Leg: absent      Imaging:  X-rays of the left knee from 01/25/2025 personally viewed by me on that day these include nonweightbearing AP and lateral.  Evidence of multi ligament knee reconstruction.  No noted complications.    Assessment:   Felicity Cohen is a 24 y.o. female status post above and doing well  Encounter Diagnoses   Name Primary?    Rupture of anterior cruciate ligament of left knee, subsequent encounter Yes    Injury of posterolateral corner of left knee, subsequent encounter         Plan:     Assessment & Plan    FOLLOW UP:  - Follow up in 3 months.  - Contact the office if a work note is needed.    PATIENT INSTRUCTIONS:  - Inform physical therapist about right knee pain for targeted exercises.             This note was generated with the assistance of ambient listening technology. Verbal consent was obtained by the patient and accompanying visitor(s) for the recording of patient appointment to facilitate this note. I attest to having reviewed and edited the generated note for accuracy, though some syntax or spelling errors may persist. Please contact the author of this note for any clarification.      All of the patient's questions were answered. Patient was advised to call the clinic or contact me through the patient portal for any questions or concerns.         Patient questionnaires may have been collected.

## 2025-04-25 ENCOUNTER — TELEPHONE (OUTPATIENT)
Dept: SPORTS MEDICINE | Facility: CLINIC | Age: 25
End: 2025-04-25
Payer: MEDICAID

## 2025-04-25 NOTE — TELEPHONE ENCOUNTER
Spoke w/ pt to find out what happened. Pt had a fall directly on the left knee which caused some swelling and pain. I scheduled the pt to see Dr. Berry during the call on 5/8        ----- Message from Millie sent at 4/25/2025  8:18 AM CDT -----  Regarding: Appt  Contact: pt [phone]  Pt is requesting call back to discuss plan of care, pt is post op left knee surgery 12/2024, pt has fallen on this knee, please call pt @[phone]

## 2025-05-02 ENCOUNTER — CLINICAL SUPPORT (OUTPATIENT)
Dept: REHABILITATION | Facility: HOSPITAL | Age: 25
End: 2025-05-02
Payer: MEDICAID

## 2025-05-02 DIAGNOSIS — M25.562 ACUTE PAIN OF LEFT KNEE: Primary | ICD-10-CM

## 2025-05-02 PROCEDURE — 97110 THERAPEUTIC EXERCISES: CPT

## 2025-05-02 NOTE — PROGRESS NOTES
"  Outpatient Rehab  Physical Therapy Treatment Note     Name: Felicity Cohen  Clinic Number: [id number]    Therapy Diagnosis:   Encounter Diagnosis   Name Primary?    Acute pain of left knee Yes         Physician: Jhony Berry MD    Visit Date: 5/2/2025    Physician Orders: PT Eval and Treat   Medical Diagnosis from Referral:   S83.512A (ICD-10-CM) - Rupture of anterior cruciate ligament of left knee, initial encounter   S83.422A (ICD-10-CM) - Tear of lateral collateral ligament of left knee, initial encounter   S83.8X2D (ICD-10-CM) - Acute medial meniscal injury of left knee, subsequent encounter      Evaluation Date: 10/24/2024  Re-evaluation date: 12/13/2024  Authorization Period Expiration: 12/31/25  Plan of Care Expiration: 12/31/25  Visit # / Visits authorized: 28/44  FOTO: 1/3     Time In: 1255 pm  Time Out: 150 pm  Total Billable Time: 55 minutes   PT tech extender used this session    Precautions: Standard, post op  PROCEDURE PERFORMED:   Left ACL reconstruction with quadriceps tendon autograft   Left posterolateral corner reconstruction with allograft  Left knee lateral extra-articular tenodesis  Left knee arthroscopic medial meniscus repair   Left knee arthroscopic lateral meniscus repair   Left knee arthroscopic synovectomy   Left knee arthroscopic lysis of adhesions   Left knee open peroneal nerve neurolysis      SUBJECTIVE     Pt reports she slipped on some water and fell directly on her L knee. Has a follow up [facility] soon.  She was compliant with home exercise program.  Response to previous treatment: none  Functional change:  on going    Pain: not quantified/10  Location: left knee      OBJECTIVE   3/12/25    L knee ROM:  5-0-115 degrees    Treatment       Felicity received the treatments listed below:      Therapeutic exercises to develop strength, endurance, ROM, posture, and core stabilization for 55 minutes including:  Rogue x 10 min (bike today)  LAQ hammer 4x8 #5  Step up 4" " "3x10-np  Standing off side SLR 3x10-np  Sled push #90 3 laps  DL heel raises 2x10-np  Shuttle SideLying hip ext #12 5x6-np  Shuttle dbl 100# 3x10  Sit to stand 20" box  L bias 3x10 15#-np  Inferior patella mobs in flexion on table -np  Matrix HS curls 15# 3x fatigue  SL hip abd 3# 3x10 with 5 sec hold     *BILLED PER MEDICAID GUIDELINES*      Patient Education and Home Exercises     Home Exercises Provided and Patient Education Provided     Education provided:   - cont HEP, heel slides with brace donned    Written Home Exercises Provided: Patient instructed to cont prior HEP. Exercises were reviewed and Felicity was able to demonstrate them prior to the end of the session.  Felicity demonstrated good  understanding of the education provided. See EMR under Patient Instructions for exercises provided during therapy sessions    ASSESSMENT     Felicity treatment was maintained since recent fall due to still having some tenderness and slight limping. Plan to continue progressing quad loading to tolerance to achieve functional goals.     Felicity Is progressing well towards her goals.     Pt prognosis is Good.     Pt will continue to benefit from skilled outpatient physical therapy to address the deficits listed in the problem list box on initial evaluation, provide pt/family education and to maximize pt's level of independence in the home and community environment.     Pt's spiritual, cultural and educational needs considered and pt agreeable to plan of care and goals.     Anticipated barriers to physical therapy: none    Goals:   Short Term Goals: 6 weeks Post op  - Pt will increase ROM to +5 L knee hyperextension, 90 deg flex L knee (Progressing, not met)  - Decrease Pain to 3/10 as worst with all PT interventions (Progressing, not met)  - Pt to self correct posture and gait with no AD or brace and minimal cues (Progressing, not met)  - Pt independent with HEP with progressions. (Met)     Long Term Goals ( 1 " year):(Progressing, not met)  - Pt will increase ROM to 0 deg L knee hyperextension and 110 deg flexion  - Pt will increase strength to 5/5 L knee in all planes  - Decrease Pain to 0/10 as worst with stairs, walking and lifting weights  - Pt to return to 80% PLOF    PLAN     Plan of care Certification: 12/13/2024 to 12/31/25.     Outpatient Physical Therapy 2 times weekly for 52 weeks to include the following interventions: Gait Training, Manual Therapy, Moist Heat/ Ice, Neuromuscular Re-ed, Patient Education, Therapeutic Activities, and Therapeutic Exercise.    Deborah Coe, PT,

## 2025-05-08 ENCOUNTER — HOSPITAL ENCOUNTER (OUTPATIENT)
Dept: RADIOLOGY | Facility: HOSPITAL | Age: 25
Discharge: HOME | End: 2025-05-08
Attending: STUDENT IN AN ORGANIZED HEALTH CARE EDUCATION/TRAINING PROGRAM
Payer: MEDICAID

## 2025-05-08 ENCOUNTER — OFFICE VISIT (OUTPATIENT)
Dept: SPORTS MEDICINE | Facility: CLINIC | Age: 25
End: 2025-05-08
Payer: MEDICAID

## 2025-05-08 VITALS
SYSTOLIC BLOOD PRESSURE: 128 MMHG | HEIGHT: 65 IN | BODY MASS INDEX: 48.82 KG/M2 | HEART RATE: 90 BPM | WEIGHT: 293 LBS | DIASTOLIC BLOOD PRESSURE: 85 MMHG

## 2025-05-08 DIAGNOSIS — S83.512D RUPTURE OF ANTERIOR CRUCIATE LIGAMENT OF LEFT KNEE, SUBSEQUENT ENCOUNTER: ICD-10-CM

## 2025-05-08 DIAGNOSIS — M25.562 LEFT KNEE PAIN, UNSPECIFIED CHRONICITY: ICD-10-CM

## 2025-05-08 DIAGNOSIS — S89.92XD INJURY OF POSTEROLATERAL CORNER OF LEFT KNEE, SUBSEQUENT ENCOUNTER: ICD-10-CM

## 2025-05-08 DIAGNOSIS — M25.562 ACUTE PAIN OF LEFT KNEE: ICD-10-CM

## 2025-05-08 PROCEDURE — 99214 OFFICE O/P EST MOD 30 MIN: CPT | Mod: S$PBB,,, | Performed by: STUDENT IN AN ORGANIZED HEALTH CARE EDUCATION/TRAINING PROGRAM

## 2025-05-08 PROCEDURE — 1160F RVW MEDS BY RX/DR IN RCRD: CPT | Mod: CPTII,,, | Performed by: STUDENT IN AN ORGANIZED HEALTH CARE EDUCATION/TRAINING PROGRAM

## 2025-05-08 PROCEDURE — 73564 X-RAY EXAM KNEE 4 OR MORE: CPT | Mod: TC,50

## 2025-05-08 PROCEDURE — 99214 OFFICE O/P EST MOD 30 MIN: CPT | Mod: PBBFAC,25 | Performed by: STUDENT IN AN ORGANIZED HEALTH CARE EDUCATION/TRAINING PROGRAM

## 2025-05-08 PROCEDURE — 99999 PR PBB SHADOW E&M-EST. PATIENT-LVL IV: CPT | Mod: PBBFAC,,, | Performed by: STUDENT IN AN ORGANIZED HEALTH CARE EDUCATION/TRAINING PROGRAM

## 2025-05-08 PROCEDURE — 1159F MED LIST DOCD IN RCRD: CPT | Mod: CPTII,,, | Performed by: STUDENT IN AN ORGANIZED HEALTH CARE EDUCATION/TRAINING PROGRAM

## 2025-05-08 PROCEDURE — 3074F SYST BP LT 130 MM HG: CPT | Mod: CPTII,,, | Performed by: STUDENT IN AN ORGANIZED HEALTH CARE EDUCATION/TRAINING PROGRAM

## 2025-05-08 PROCEDURE — 3008F BODY MASS INDEX DOCD: CPT | Mod: CPTII,,, | Performed by: STUDENT IN AN ORGANIZED HEALTH CARE EDUCATION/TRAINING PROGRAM

## 2025-05-08 PROCEDURE — 3079F DIAST BP 80-89 MM HG: CPT | Mod: CPTII,,, | Performed by: STUDENT IN AN ORGANIZED HEALTH CARE EDUCATION/TRAINING PROGRAM

## 2025-05-08 PROCEDURE — 73564 X-RAY EXAM KNEE 4 OR MORE: CPT | Mod: 26,50,, | Performed by: RADIOLOGY

## 2025-05-08 NOTE — PROGRESS NOTES
Subjective:     Chief Complaint: Felicity Cohen is a 24 y.o. female who had concerns including Pain of the Left Knee and Follow-up.    HPI    HPI 05/08/2025  History of Present Illness    CHIEF COMPLAINT:  - Left knee evaluation after recent slip and fall.    HPI:  Client presents for follow-up after a recent fall at home. She slipped while walking through the house after returning from the gym, landing directly on her left leg. The injury occurred a few weeks ago, and symptoms have remained consistent since then. She experienced tenderness in the anterior knee and reports frequent popping. There is slight swelling, and some pain in the posterior knee when extending. She was able to ambulate immediately after the fall without assistance. She reports knee stability and trusts it when standing on her left leg alone. She denies pain on the lateral knee but mentions some tenderness anteriorly. She is currently undergoing PT.    She denies hearing a pop, tear, or crack during the fall, and did not experience the same numbness as with her previous injury.    PREVIOUS TREATMENTS:  - PT: Currently ongoing           HPI 1/25/2025  History of Present Illness    CHIEF COMPLAINT:  - Multiple ligament repair follow-up    HPI:  Client presents for a 6-week post-operative follow-up after knee surgery involving multiple ligament repairs, including ACL reconstruction and work on the posterior lateral corner. She reports overall improvement in her knee's condition, noting it feels different. She has been following post-operative protocols, including wearing a knee brace consistently, even during sleep. Client began 50% weight-bearing yesterday as instructed. She has been cautious during recent snowfall, with family members discouraging crutch use outdoors.    She has been attending physical therapy sessions, working on regaining range of motion and strength. Client expresses some impatience with the recovery timeline, inquiring  about the possibility of returning to school in August, approximately 7-8 months post-surgery. She asks about applying force during knee bending exercises, indicating she has been performing these independently without assistance from physical therapists.    Client denies any significant stiffness in the knee that would require immediate intervention.    PREVIOUS TREATMENTS:  - Client has been using crutches and wearing a knee brace, including while sleeping, for the past 6 weeks.  - Client started physical therapy, which has been ongoing.  - Client began putting 50% weight on the affected leg as of yesterday.    WORK STATUS:  - Student, inquiring about returning to school in August (7-8 months post-surgery)         PROCEDURE PERFORMED by Jhony Berry MD on 12/12/2024:   Left ACL reconstruction with quadriceps tendon autograft   Left posterolateral corner reconstruction with allograft  Left knee lateral extra-articular tenodesis  Left knee arthroscopic medial meniscus repair   Left knee arthroscopic lateral meniscus repair   Left knee arthroscopic synovectomy   Left knee arthroscopic lysis of adhesions   Left knee open peroneal nerve neurolysis    Past Medical History:   Diagnosis Date    ADHD (attention deficit hyperactivity disorder)     Anxiety     Constipation     Depression     GERD (gastroesophageal reflux disease)     IBS (irritable bowel syndrome)     Ovarian cyst     Schizophrenia in children        Current Outpatient Medications on File Prior to Visit   Medication Sig Dispense Refill    acetaminophen (TYLENOL) 325 MG tablet Take 325 mg by mouth every 6 (six) hours as needed.      biotin 1 mg tablet Take 1,000 mcg by mouth twice a week.      butalbital-acetaminophen-caff -40 mg Cap Take 1 capsule by mouth daily as needed.      celecoxib (CELEBREX) 200 MG capsule Take 1 capsule (200 mg total) by mouth 2 (two) times daily with meals. 60 capsule 0    cetirizine (ZYRTEC) 10 MG tablet Take 10 mg by mouth  once daily.      ergocalciferol (ERGOCALCIFEROL) 50,000 unit Cap TAKE ONE CAPSULE BY MOUTH EVERY 7 DAYS 12 capsule 3    ergocalciferol (ERGOCALCIFEROL) 50,000 unit Cap Take 1 capsule (50,000 Units total) by mouth every 7 days. 12 capsule 3    Lactobacillus rhamnosus GG (CULTURELLE) 10 billion cell capsule Take 1 capsule by mouth once daily.      methocarbamoL (ROBAXIN) 500 MG Tab Take 1 tablet (500 mg total) by mouth 3 (three) times daily as needed (muscle spasms). 30 tablet 0    mupirocin (BACTROBAN) 2 % ointment Apply topically 3 (three) times daily. 15 g 0    omega-3/dha/epa/ala/vitamin D3 (OMEGA-3-DHA-EPA-ALA-VIT D3 ORAL) Take by mouth.      polyethylene glycol (GLYCOLAX) 17 gram/dose powder Take 17 g by mouth once daily. 510 g 11    promethazine (PHENERGAN) 25 MG tablet Take 1 tablet (25 mg total) by mouth every 6 (six) hours as needed for Nausea. 30 tablet 0    triamcinolone acetonide 0.1% (KENALOG) 0.1 % ointment Apply topically 2 (two) times daily. To affected area 80 g 0    aspirin (ECOTRIN) 81 MG EC tablet Take 1 tablet (81 mg total) by mouth once daily. 42 tablet 0    oxyCODONE (ROXICODONE) 5 MG immediate release tablet Take 1 tablet (5 mg total) by mouth every 6 (six) hours as needed for Pain. (Patient not taking: Reported on 5/8/2025) 21 tablet 0     No current facility-administered medications on file prior to visit.       Past Surgical History:   Procedure Laterality Date    ARTHROSCOPY,KNEE,WITH MENISCUS REPAIR Left 12/12/2024    Procedure: ARTHROSCOPY,KNEE,WITH MENISCUS REPAIR;  Surgeon: Jhony Berry MD;  Location: [facility] OR [address] [place] [address];  Service: Orthopedics;  Laterality: Left;    DECOMPRESSION OF NERVE Left 12/12/2024    Procedure: DECOMPRESSION, NERVE;  Surgeon: Jhony Berry MD;  Location: [facility] OR [address] [place] [address];  Service: Orthopedics;  Laterality: Left;    ESOPHAGOGASTRODUODENOSCOPY N/A 2/8/2022    Procedure: EGD (ESOPHAGOGASTRODUODENOSCOPY);  Surgeon: Berkley Mckenna MD;  Location: [facility]  ENDO;  Service: Endoscopy;  Laterality: N/A;    RECONSTRUCTION OF ANTERIOR CRUCIATE LIGAMENT USING GRAFT Left 12/12/2024    Procedure: RECONSTRUCTION, KNEE, ACL, QUAD TENDON AUTOGRAFT;  Surgeon: Jhony Berry MD;  [facility];  Service: Orthopedics;  Laterality: Left;    RECONSTRUCTION OF LIGAMENT Left 12/12/2024    Procedure: RECONSTRUCTION, POSTEROLATERAL CORNER (PLC) WITH ALLOGRAFT;  Surgeon: Jhony Berry MD;  Location: [facility] OR [facility];  Service: Orthopedics;  Laterality: Left;    RECONSTRUCTION, LIGAMENT, KNEE, EXTRA-ARTICULAR Left 12/12/2024    Procedure: RECONSTRUCTION, LIGAMENT, KNEE, EXTRA-ARTICULAR;  Surgeon: Jhony Berry MD;  [facility];  Service: Orthopedics;  Laterality: Left;    SYNOVECTOMY, KNEE, LIMITED, ARTHROSCOPIC Left 12/12/2024    Procedure: SYNOVECTOMY, KNEE, LIMITED, ARTHROSCOPIC;  Surgeon: Jhony Berry MD;  Location: [facility] OR [address] [place] [address];  Service: Orthopedics;  Laterality: Left;    WISDOM TOOTH EXTRACTION         Family History   Problem Relation Name Age of Onset    Asthma Mother      Diabetes Father         Social History     Socioeconomic History    Marital status: Single   Tobacco Use    Smoking status: Never     Passive exposure: Yes    Smokeless tobacco: Never   Substance and Sexual Activity    Alcohol use: No    Drug use: Never    Sexual activity: Yes     Partners: Male       Review of Systems   Constitutional: Negative.   HENT: Negative.     Eyes: Negative.    Cardiovascular: Negative.    Respiratory: Negative.     Endocrine: Negative.    Hematologic/Lymphatic: Negative.    Skin: Negative.    Musculoskeletal:  Positive for joint pain, joint swelling and muscle weakness. Negative for falls, myalgias and stiffness.   Neurological: Negative.    Psychiatric/Behavioral: Negative.     Allergic/Immunologic: Negative.        Pain Related Questions  Over the past 3 days, what was your average pain during activity? (I.e. running, jogging, walking,  climbing stairs, getting dressed, ect.): 6  Over the past 3 days, what was your highest pain level?: 6  Over the past 3 days, what was your lowest pain level? : 5    Other  How many nights a week are you awakened by your affected body part?: 2  Was the patient's HEIGHT measured or patient reported?: Patient Reported  Was the patient's WEIGHT measured or patient reported?: Measured    Objective:     General: Felicity MENJIVAR is well-developed, well-nourished, appears stated age, in no acute distress, alert and oriented to time, place and person.     General    Nursing note and vitals reviewed.  Constitutional: She is oriented to person, place, and time. She appears well-developed and well-nourished. No distress.   HENT:   Head: Normocephalic and atraumatic.   Nose: Nose normal.   Eyes: EOM are normal.   Cardiovascular:  Intact distal pulses.            Pulmonary/Chest: Effort normal. No respiratory distress.   Neurological: She is alert and oriented to person, place, and time.   Psychiatric: She has a normal mood and affect. Her behavior is normal. Judgment and thought content normal.     General Musculoskeletal Exam   Gait: normal       Right Knee Exam     Tenderness   The patient is experiencing no tenderness.     Range of Motion   Extension:  -10   Flexion:  140     Left Knee Exam     Inspection   Erythema: absent  Scars: present  Swelling: absent  Effusion: absent  Deformity: absent  Bruising: absent    Tenderness   The patient tender to palpation of the lateral joint line.    Range of Motion   Extension:  -10   Flexion:  130     Tests   Stability   Lachman: normal (-1 to 2mm)   PCL-Posterior Drawer: normal (0 to 2mm)  MCL - Valgus: normal (0 to 2mm)  LCL - Varus: abnormal    Other   Sensation: normal    Muscle Strength   Left Lower Extremity   Quadriceps:  4/5   Hamstrin/5     Vascular Exam       Left Pulses  Dorsalis Pedis:      2+  Posterior Tibial:      2+        Edema  Left Lower Leg:  absent      Imaging:  X-rays of the left knee from 01/25/2025 personally viewed by me on that day these include nonweightbearing AP and lateral.  Evidence of multi ligament knee reconstruction.  No noted complications.    X-rays of the bilateral knees from 05/08/2025 personally viewed by me on that day these weight-bearing AP, PA flexion lateral, Merchant views joint is well-maintained.  No fracture.  Evidence of multi ligament reconstruction on the left.  There is increased in the space of the lateral compartment on the weight-bearing views of the left knee when compared to the right.    Assessment:   Felicity Cohen is a 24 y.o. female status post above with a fall concerning for possible lateral structure injury.  Encounter Diagnoses   Name Primary?    Left knee pain, unspecified chronicity     Acute pain of left knee     Rupture of anterior cruciate ligament of left knee, subsequent encounter     Injury of posterolateral corner of left knee, subsequent encounter         Plan:     Assessment & Plan    IMAGING:  - Ordered MRI Left Knee to evaluate condition of previous repairs and assess for new damage.    PROCEDURES:  - # Procedures    FOLLOW UP:  - Follow up after MRI to review results.         This note was generated with the assistance of ambient listening technology. Verbal consent was obtained by the patient and accompanying visitor(s) for the recording of patient appointment to facilitate this note. I attest to having reviewed and edited the generated note for accuracy, though some syntax or spelling errors may persist. Please contact the author of this note for any clarification.      All of the patient's questions were answered. Patient was advised to call the clinic or contact me through the patient portal for any questions or concerns.         Patient questionnaires may have been collected.

## 2025-05-08 NOTE — PROGRESS NOTES
Subjective:     Chief Complaint: Felicity Cohen is a 24 y.o. female who had concerns including Pain of the Left Knee and Follow-up.    HPI    HPI 05/08/2025  History of Present Illness                HPI 04/24/2025  History of Present Illness    CHIEF COMPLAINT:  - Left knee follow-up and new right knee pain.    HPI:  Client presents for follow-up regarding her left knee, approximately 4-5 months post-operative. Her left knee motion is good, and she can straighten the left leg out, though not to full hyperextension. Walking feels fine and the knee feels stable. However, she notes some weakness in the quadriceps, causing the knee to occasionally buckle. She is currently undergoing PT.    She reports new onset of pain in the right knee, describing it as an aching pain in the center of the knee. This pain occurred while walking and standing up the previous day. It is also present when going up and down stairs, and when transitioning from sitting to standing.    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 She has been going to the park with her mother and is attempting to increase her daily step count.    PREVIOUS TREATMENTS:  - PT: Ongoing for left knee.    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 She reports overall improvement in her knee's condition, noting it feels different. She has been following post-operative protocols, including wearing a knee brace consistently, even during sleep. Client began 50% weight-bearing yesterday as  instructed. She has been cautious during recent snowfall, with family members discouraging crutch use outdoors.    She has been attending physical therapy sessions, working on regaining range of motion and strength. Client expresses some impatience with the recovery timeline, inquiring about the possibility of returning to school in August, approximately 7-8 months post-surgery. She asks about applying force during knee bending exercises, indicating she has been performing these independently without assistance from physical therapists.    Client denies any significant stiffness in the knee that would require immediate intervention.    PREVIOUS TREATMENTS:  - Client has been using crutches and wearing a knee brace, including while sleeping, for the past 6 weeks.  - Client started physical therapy, which has been ongoing.  - Client began putting 50% weight on the affected leg as of yesterday.    WORK STATUS:  - Student, inquiring about returning to school in August (7-8 months post-surgery)         PROCEDURE PERFORMED by Jhony Berry MD on 12/12/2024:   Left ACL reconstruction with quadriceps tendon autograft   Left posterolateral corner reconstruction with allograft  Left knee lateral extra-articular tenodesis  Left knee arthroscopic medial meniscus repair   Left knee arthroscopic lateral meniscus repair   Left knee arthroscopic synovectomy   Left knee arthroscopic lysis of adhesions   Left knee open peroneal nerve neurolysis    Past Medical History:   Diagnosis Date    ADHD (attention deficit hyperactivity disorder)     Anxiety     Constipation     Depression     GERD (gastroesophageal reflux disease)     IBS (irritable bowel syndrome)     Ovarian cyst     Schizophrenia in children        Current Outpatient Medications on File Prior to Visit   Medication Sig Dispense Refill    acetaminophen (TYLENOL) 325 MG tablet Take 325 mg by mouth every 6 (six) hours as needed.      biotin 1 mg tablet Take 1,000 mcg by mouth  twice a week.      butalbital-acetaminophen-caff -40 mg Cap Take 1 capsule by mouth daily as needed.      celecoxib (CELEBREX) 200 MG capsule Take 1 capsule (200 mg total) by mouth 2 (two) times daily with meals. 60 capsule 0    cetirizine (ZYRTEC) 10 MG tablet Take 10 mg by mouth once daily.      ergocalciferol (ERGOCALCIFEROL) 50,000 unit Cap TAKE ONE CAPSULE BY MOUTH EVERY 7 DAYS 12 capsule 3    ergocalciferol (ERGOCALCIFEROL) 50,000 unit Cap Take 1 capsule (50,000 Units total) by mouth every 7 days. 12 capsule 3    Lactobacillus rhamnosus GG (CULTURELLE) 10 billion cell capsule Take 1 capsule by mouth once daily.      methocarbamoL (ROBAXIN) 500 MG Tab Take 1 tablet (500 mg total) by mouth 3 (three) times daily as needed (muscle spasms). 30 tablet 0    mupirocin (BACTROBAN) 2 % ointment Apply topically 3 (three) times daily. 15 g 0    omega-3/dha/epa/ala/vitamin D3 (OMEGA-3-DHA-EPA-ALA-VIT D3 ORAL) Take by mouth.      polyethylene glycol (GLYCOLAX) 17 gram/dose powder Take 17 g by mouth once daily. 510 g 11    promethazine (PHENERGAN) 25 MG tablet Take 1 tablet (25 mg total) by mouth every 6 (six) hours as needed for Nausea. 30 tablet 0    triamcinolone acetonide 0.1% (KENALOG) 0.1 % ointment Apply topically 2 (two) times daily. To affected area 80 g 0    aspirin (ECOTRIN) 81 MG EC tablet Take 1 tablet (81 mg total) by mouth once daily. 42 tablet 0    oxyCODONE (ROXICODONE) 5 MG immediate release tablet Take 1 tablet (5 mg total) by mouth every 6 (six) hours as needed for Pain. (Patient not taking: Reported on 5/8/2025) 21 tablet 0     No current facility-administered medications on file prior to visit.       Past Surgical History:   Procedure Laterality Date    ARTHROSCOPY,KNEE,WITH MENISCUS REPAIR Left 12/12/2024    Procedure: ARTHROSCOPY,KNEE,WITH MENISCUS REPAIR;  Surgeon: Jhony Berry MD;  Location: [facility] OR [address] [place] [address];  Service: Orthopedics;  Laterality: Left;    DECOMPRESSION OF NERVE Left  12/12/2024    Procedure: DECOMPRESSION, NERVE;  Surgeon: Jhony Berry MD;  [facility];  Service: Orthopedics;  Laterality: Left;    ESOPHAGOGASTRODUODENOSCOPY N/A 2/8/2022    Procedure: EGD (ESOPHAGOGASTRODUODENOSCOPY);  Surgeon: Berkley Mckenna MD;  Location: [facility];  Service: Endoscopy;  Laterality: N/A;    RECONSTRUCTION OF ANTERIOR CRUCIATE LIGAMENT USING GRAFT Left 12/12/2024    Procedure: RECONSTRUCTION, KNEE, ACL, QUAD TENDON AUTOGRAFT;  Surgeon: Jhony Berry MD;  Location: [facility] OR [address] [place] [address];  Service: Orthopedics;  Laterality: Left;    RECONSTRUCTION OF LIGAMENT Left 12/12/2024    Procedure: RECONSTRUCTION, POSTEROLATERAL CORNER (PLC) WITH ALLOGRAFT;  Surgeon: Jhony Berry MD;  Location: [facility] OR [address] [place] [address];  Service: Orthopedics;  Laterality: Left;    RECONSTRUCTION, LIGAMENT, KNEE, EXTRA-ARTICULAR Left 12/12/2024    Procedure: RECONSTRUCTION, LIGAMENT, KNEE, EXTRA-ARTICULAR;  Surgeon: Jhony Berry MD;  Location: [address];  Service: Orthopedics;  Laterality: Left;    SYNOVECTOMY, KNEE, LIMITED, ARTHROSCOPIC Left 12/12/2024    Procedure: SYNOVECTOMY, KNEE, LIMITED, ARTHROSCOPIC;  Surgeon: Jhony Berry MD;  Location: [facility] OR [address] [place] [address];  Service: Orthopedics;  Laterality: Left;    WISDOM TOOTH EXTRACTION         Family History   Problem Relation Name Age of Onset    Asthma Mother      Diabetes Father         Social History     Socioeconomic History    Marital status: Single   Tobacco Use    Smoking status: Never     Passive exposure: Yes    Smokeless tobacco: Never   Substance and Sexual Activity    Alcohol use: No    Drug use: Never    Sexual activity: Yes     Partners: Male       Review of Systems   Constitutional: Negative.   HENT: Negative.     Eyes: Negative.    Cardiovascular: Negative.    Respiratory: Negative.     Endocrine: Negative.    Hematologic/Lymphatic: Negative.    Skin: Negative.    Musculoskeletal:  Positive for muscle weakness. Negative for  falls, joint pain, joint swelling, myalgias and stiffness.   Neurological: Negative.    Psychiatric/Behavioral: Negative.     Allergic/Immunologic: Negative.        Pain Related Questions  Over the past 3 days, what was your average pain during activity? (I.e. running, jogging, walking, climbing stairs, getting dressed, ect.): 6  Over the past 3 days, what was your highest pain level?: 6  Over the past 3 days, what was your lowest pain level? : 5    Other  How many nights a week are you awakened by your affected body part?: 2  Was the patient's HEIGHT measured or patient reported?: Patient Reported  Was the patient's WEIGHT measured or patient reported?: Measured    Objective:     General: Felicity MENJIVAR is well-developed, well-nourished, appears stated age, in no acute distress, alert and oriented to time, place and person.     General    Nursing note and vitals reviewed.  Constitutional: She is oriented to person, place, and time. She appears well-developed and well-nourished. No distress.   HENT:   Head: Normocephalic and atraumatic.   Nose: Nose normal.   Eyes: EOM are normal.   Cardiovascular:  Intact distal pulses.            Pulmonary/Chest: Effort normal. No respiratory distress.   Neurological: She is alert and oriented to person, place, and time.   Psychiatric: She has a normal mood and affect. Her behavior is normal. Judgment and thought content normal.     General Musculoskeletal Exam   Gait: normal       Right Knee Exam     Range of Motion   Extension:  -10   Flexion:  140     Left Knee Exam     Inspection   Erythema: absent  Scars: present  Swelling: absent  Effusion: absent  Deformity: absent  Bruising: absent    Range of Motion   Extension:  -5   Flexion:  130     Tests   Stability   Lachman: normal (-1 to 2mm)   PCL-Posterior Drawer: normal (0 to 2mm)  MCL - Valgus: normal (0 to 2mm)  LCL - Varus: normal (0 to 2mm)    Other   Sensation: normal    Muscle Strength   Left Lower Extremity   Quadriceps:  4/5    Hamstrin/5     Vascular Exam       Left Pulses  Dorsalis Pedis:      2+  Posterior Tibial:      2+        Edema  Left Lower Leg: absent      Imaging:  X-rays of the left knee from 2025 personally viewed by me on that day these include nonweightbearing AP and lateral.  Evidence of multi ligament knee reconstruction.  No noted complications.    Assessment:   Felicity Cohen is a 24 y.o. female status post above and doing well  Encounter Diagnosis   Name Primary?    Left knee pain, unspecified chronicity Yes        Plan:     Assessment & Plan                  This note was generated with the assistance of ambient listening technology. Verbal consent was obtained by the patient and accompanying visitor(s) for the recording of patient appointment to facilitate this note. I attest to having reviewed and edited the generated note for accuracy, though some syntax or spelling errors may persist. Please contact the author of this note for any clarification.      All of the patient's questions were answered. Patient was advised to call the clinic or contact me through the patient portal for any questions or concerns.         Patient questionnaires may have been collected.

## 2025-05-14 ENCOUNTER — CLINICAL SUPPORT (OUTPATIENT)
Dept: REHABILITATION | Facility: HOSPITAL | Age: 25
End: 2025-05-14
Payer: MEDICAID

## 2025-05-14 DIAGNOSIS — M25.562 ACUTE PAIN OF LEFT KNEE: Primary | ICD-10-CM

## 2025-05-14 PROCEDURE — 97110 THERAPEUTIC EXERCISES: CPT

## 2025-05-14 NOTE — PROGRESS NOTES
"  Outpatient Rehab  Physical Therapy Treatment Note     Name: Felicity Cohen  Clinic Number: [id number]    Therapy Diagnosis:   Encounter Diagnosis   Name Primary?    Acute pain of left knee Yes         Physician: Jhony Berry MD    Visit Date: 5/14/2025    Physician Orders: PT Eval and Treat   Medical Diagnosis from Referral:   S83.512A (ICD-10-CM) - Rupture of anterior cruciate ligament of left knee, initial encounter   S83.422A (ICD-10-CM) - Tear of lateral collateral ligament of left knee, initial encounter   S83.8X2D (ICD-10-CM) - Acute medial meniscal injury of left knee, subsequent encounter      Evaluation Date: 10/24/2024  Re-evaluation date: 12/13/2024  Authorization Period Expiration: 12/31/25  Plan of Care Expiration: 12/31/25  Visit # / Visits authorized: 28/44  FOTO: 3/3     Time In: 110 pm  Time Out: 1155 pm  Total Billable Time: 45 minutes   PT tech extender used this session    Precautions: Standard, post op  PROCEDURE PERFORMED:   Left ACL reconstruction with quadriceps tendon autograft   Left posterolateral corner reconstruction with allograft  Left knee lateral extra-articular tenodesis  Left knee arthroscopic medial meniscus repair   Left knee arthroscopic lateral meniscus repair   Left knee arthroscopic synovectomy   Left knee arthroscopic lysis of adhesions   Left knee open peroneal nerve neurolysis      SUBJECTIVE     Pt reports she is getting a MRI to see if she has any injuries secondary to pain since fall.   She was compliant with home exercise program.  Response to previous treatment: none  Functional change:  on going    Pain: not quantified/10  Location: left knee      OBJECTIVE   3/12/25    L knee ROM:  5-0-115 degrees    Treatment       Felicity received the treatments listed below:      Therapeutic exercises to develop strength, endurance, ROM, posture, and core stabilization for 45 minutes including:  Rogue x 10 min (bike today)  LAQ hammer 4x8 #5  Step up 4" 3x10-np  Standing " "off side SLR 3x10-np  Sled push #90 3 laps-np  DL heel raises 100# 3x10  Shuttle SideLying hip ext #12 5x6-np  Shuttle dbl 100# 3x10  Sit to stand 20" box  L bias 3x10   Inferior patella mobs in flexion on table -np  Matrix HS curls 15# 3x fatigue  SL hip abd 3# 3x10 with 5 sec hold-np     *BILLED PER MEDICAID GUIDELINES*      Patient Education and Home Exercises     Home Exercises Provided and Patient Education Provided     Education provided:   - cont HEP, heel slides with brace donned    Written Home Exercises Provided: Patient instructed to cont prior HEP. Exercises were reviewed and Felicity was able to demonstrate them prior to the end of the session.  Felicity demonstrated good  understanding of the education provided. See EMR under Patient Instructions for exercises provided during therapy sessions    ASSESSMENT     Felicity treatment was maintained again secondary to medial knee pain accompanying her recent fall. Plan to continue progressing quad loading to tolerance to achieve functional goals once cleared from MRI.    Felicity Is progressing well towards her goals.     Pt prognosis is Good.     Pt will continue to benefit from skilled outpatient physical therapy to address the deficits listed in the problem list box on initial evaluation, provide pt/family education and to maximize pt's level of independence in the home and community environment.     Pt's spiritual, cultural and educational needs considered and pt agreeable to plan of care and goals.     Anticipated barriers to physical therapy: none    Goals:   Short Term Goals: 6 weeks Post op  - Pt will increase ROM to +5 L knee hyperextension, 90 deg flex L knee (Progressing, not met)  - Decrease Pain to 3/10 as worst with all PT interventions (Progressing, not met)  - Pt to self correct posture and gait with no AD or brace and minimal cues (Progressing, not met)  - Pt independent with HEP with progressions. (Met)     Long Term Goals ( 1 " year):(Progressing, not met)  - Pt will increase ROM to 0 deg L knee hyperextension and 110 deg flexion  - Pt will increase strength to 5/5 L knee in all planes  - Decrease Pain to 0/10 as worst with stairs, walking and lifting weights  - Pt to return to 80% PLOF    PLAN     Plan of care Certification: 12/13/2024 to 12/31/25.     Outpatient Physical Therapy 2 times weekly for 52 weeks to include the following interventions: Gait Training, Manual Therapy, Moist Heat/ Ice, Neuromuscular Re-ed, Patient Education, Therapeutic Activities, and Therapeutic Exercise.    Deborah Coe, PT,

## 2025-05-20 ENCOUNTER — HOSPITAL ENCOUNTER (OUTPATIENT)
Dept: RADIOLOGY | Facility: HOSPITAL | Age: 25
Discharge: HOME | End: 2025-05-20
Attending: STUDENT IN AN ORGANIZED HEALTH CARE EDUCATION/TRAINING PROGRAM
Payer: MEDICAID

## 2025-05-20 DIAGNOSIS — M25.562 ACUTE PAIN OF LEFT KNEE: ICD-10-CM

## 2025-05-20 DIAGNOSIS — S89.92XD INJURY OF POSTEROLATERAL CORNER OF LEFT KNEE, SUBSEQUENT ENCOUNTER: ICD-10-CM

## 2025-05-20 DIAGNOSIS — S83.512D RUPTURE OF ANTERIOR CRUCIATE LIGAMENT OF LEFT KNEE, SUBSEQUENT ENCOUNTER: ICD-10-CM

## 2025-05-20 PROCEDURE — 73721 MRI JNT OF LWR EXTRE W/O DYE: CPT | Mod: 26,LT,, | Performed by: INTERNAL MEDICINE

## 2025-05-20 PROCEDURE — 73721 MRI JNT OF LWR EXTRE W/O DYE: CPT | Mod: TC,LT

## 2025-05-21 ENCOUNTER — CLINICAL SUPPORT (OUTPATIENT)
Dept: REHABILITATION | Facility: HOSPITAL | Age: 25
End: 2025-05-21
Payer: MEDICAID

## 2025-05-21 DIAGNOSIS — M25.562 ACUTE PAIN OF LEFT KNEE: Primary | ICD-10-CM

## 2025-05-21 PROCEDURE — 97110 THERAPEUTIC EXERCISES: CPT | Mod: CQ

## 2025-05-21 NOTE — PROGRESS NOTES
Outpatient Rehab  Physical Therapy Treatment Note     Name: Felicity Cohen  Clinic Number: [id number]    Therapy Diagnosis:   Encounter Diagnosis   Name Primary?    Acute pain of left knee Yes           Physician: Jhony Berry MD    Visit Date: 5/21/2025    Physician Orders: PT Eval and Treat   Medical Diagnosis from Referral:   S83.512A (ICD-10-CM) - Rupture of anterior cruciate ligament of left knee, initial encounter   S83.422A (ICD-10-CM) - Tear of lateral collateral ligament of left knee, initial encounter   S83.8X2D (ICD-10-CM) - Acute medial meniscal injury of left knee, subsequent encounter      Evaluation Date: 10/24/2024  Re-evaluation date: 12/13/2024  Authorization Period Expiration: 12/31/25  Plan of Care Expiration: 12/31/25  Visit # / Visits authorized: 30/44  FOTO: 3/3     Time In: 1:03 pm  Time Out: 1:56 pm  Total Billable Time: 53 minutes   PT tech extender used this session    Precautions: Standard, post op  PROCEDURE PERFORMED:   Left ACL reconstruction with quadriceps tendon autograft   Left posterolateral corner reconstruction with allograft  Left knee lateral extra-articular tenodesis  Left knee arthroscopic medial meniscus repair   Left knee arthroscopic lateral meniscus repair   Left knee arthroscopic synovectomy   Left knee arthroscopic lysis of adhesions   Left knee open peroneal nerve neurolysis      SUBJECTIVE     Pt reports she got her MRI results but has not spoken to MD yet. Having pain anterior knee  She was compliant with home exercise program.  Response to previous treatment: none  Functional change:  on going    Pain: not quantified/10  Location: left knee      OBJECTIVE   3/12/25    L knee ROM:  5-0-115 degrees    Treatment       Felicity received the treatments listed below:      Therapeutic exercises to develop strength, endurance, ROM, posture, and core stabilization for 53 minutes including:  Rogue x 10 min  Shuttle dbl 100# 3x10  Shuttle SL #75 3x10  SAQ #3  "3x10  Seated SLR 3x10  Heel raises 4x15  Clamshell B 4x30"  Static lunge 3x12    Np:  LAQ hammer 4x8 #5  Step up 4" 3x10-np  Standing off side SLR 3x10-np  Sled push #90 3 laps-np  DL heel raises 100# 3x10  Shuttle SideLying hip ext #12 5x6-np  Sit to stand 20" box  L bias 3x10   Inferior patella mobs in flexion on table -np  Matrix HS curls 15# 3x fatigue  SL hip abd 3# 3x10 with 5 sec hold-np     *BILLED PER MEDICAID GUIDELINES*      Patient Education and Home Exercises     Home Exercises Provided and Patient Education Provided     Education provided:   - cont HEP, heel slides with brace donned    Written Home Exercises Provided: Patient instructed to cont prior HEP. Exercises were reviewed and Felicity was able to demonstrate them prior to the end of the session.  Felicity demonstrated good  understanding of the education provided. See EMR under Patient Instructions for exercises provided during therapy sessions    ASSESSMENT     Felicity having some anterior knee discomfort since fall. Symptoms are improving some. Still going to gym to ride bike and perform leg press. Was able to push #75 with SL without pain so can increase DL load next session on shuttle. Kept all exercises to [facility] except for SAQ.patella femoral crepitus with [facility] but is asymptomatic. Scheduled to talk with MD tomorrow with about MRI results. Will progress as tolerated.     Felicity Is progressing well towards her goals.     Pt prognosis is Good.     Pt will continue to benefit from skilled outpatient physical therapy to address the deficits listed in the problem list box on initial evaluation, provide pt/family education and to maximize pt's level of independence in the home and community environment.     Pt's spiritual, cultural and educational needs considered and pt agreeable to plan of care and goals.     Anticipated barriers to physical therapy: none    Goals:   Short Term Goals: 6 weeks Post op  - Pt will increase ROM to +5 L knee " hyperextension, 90 deg flex L knee (Progressing, not met)  - Decrease Pain to 3/10 as worst with all PT interventions (Progressing, not met)  - Pt to self correct posture and gait with no AD or brace and minimal cues (Progressing, not met)  - Pt independent with HEP with progressions. (Met)     Long Term Goals ( 1 year):(Progressing, not met)  - Pt will increase ROM to 0 deg L knee hyperextension and 110 deg flexion  - Pt will increase strength to 5/5 L knee in all planes  - Decrease Pain to 0/10 as worst with stairs, walking and lifting weights  - Pt to return to 80% PLOF    PLAN     Plan of care Certification: 12/13/2024 to 12/31/25.     Outpatient Physical Therapy 2 times weekly for 52 weeks to include the following interventions: Gait Training, Manual Therapy, Moist Heat/ Ice, Neuromuscular Re-ed, Patient Education, Therapeutic Activities, and Therapeutic Exercise.    Edwina Kelly PTA,

## 2025-05-22 ENCOUNTER — OFFICE VISIT (OUTPATIENT)
Dept: SPORTS MEDICINE | Facility: CLINIC | Age: 25
End: 2025-05-22
Payer: MEDICAID

## 2025-05-22 VITALS — DIASTOLIC BLOOD PRESSURE: 83 MMHG | SYSTOLIC BLOOD PRESSURE: 125 MMHG | BODY MASS INDEX: 60.4 KG/M2 | WEIGHT: 293 LBS

## 2025-05-22 DIAGNOSIS — S83.512D RUPTURE OF ANTERIOR CRUCIATE LIGAMENT OF LEFT KNEE, SUBSEQUENT ENCOUNTER: Primary | ICD-10-CM

## 2025-05-22 DIAGNOSIS — S89.92XD INJURY OF POSTEROLATERAL CORNER OF LEFT KNEE, SUBSEQUENT ENCOUNTER: ICD-10-CM

## 2025-05-22 DIAGNOSIS — S84.12XA INJURY OF LEFT PERONEAL NERVE, INITIAL ENCOUNTER: ICD-10-CM

## 2025-05-22 DIAGNOSIS — S89.92XA INJURY OF POSTEROLATERAL CORNER OF LEFT KNEE, INITIAL ENCOUNTER: Primary | ICD-10-CM

## 2025-05-22 PROCEDURE — 3079F DIAST BP 80-89 MM HG: CPT | Mod: CPTII,,, | Performed by: STUDENT IN AN ORGANIZED HEALTH CARE EDUCATION/TRAINING PROGRAM

## 2025-05-22 PROCEDURE — 1160F RVW MEDS BY RX/DR IN RCRD: CPT | Mod: CPTII,,, | Performed by: STUDENT IN AN ORGANIZED HEALTH CARE EDUCATION/TRAINING PROGRAM

## 2025-05-22 PROCEDURE — 3008F BODY MASS INDEX DOCD: CPT | Mod: CPTII,,, | Performed by: STUDENT IN AN ORGANIZED HEALTH CARE EDUCATION/TRAINING PROGRAM

## 2025-05-22 PROCEDURE — 99999 PR PBB SHADOW E&M-EST. PATIENT-LVL III: CPT | Mod: PBBFAC,,, | Performed by: STUDENT IN AN ORGANIZED HEALTH CARE EDUCATION/TRAINING PROGRAM

## 2025-05-22 PROCEDURE — 97760 ORTHOTIC MGMT&TRAING 1ST ENC: CPT | Mod: ,,, | Performed by: STUDENT IN AN ORGANIZED HEALTH CARE EDUCATION/TRAINING PROGRAM

## 2025-05-22 PROCEDURE — 3074F SYST BP LT 130 MM HG: CPT | Mod: CPTII,,, | Performed by: STUDENT IN AN ORGANIZED HEALTH CARE EDUCATION/TRAINING PROGRAM

## 2025-05-22 PROCEDURE — 99215 OFFICE O/P EST HI 40 MIN: CPT | Mod: S$PBB,,, | Performed by: STUDENT IN AN ORGANIZED HEALTH CARE EDUCATION/TRAINING PROGRAM

## 2025-05-22 PROCEDURE — 99213 OFFICE O/P EST LOW 20 MIN: CPT | Mod: PBBFAC | Performed by: STUDENT IN AN ORGANIZED HEALTH CARE EDUCATION/TRAINING PROGRAM

## 2025-05-22 PROCEDURE — 1159F MED LIST DOCD IN RCRD: CPT | Mod: CPTII,,, | Performed by: STUDENT IN AN ORGANIZED HEALTH CARE EDUCATION/TRAINING PROGRAM

## 2025-05-22 NOTE — PROGRESS NOTES
Subjective:     Chief Complaint: Felicity Cohen is a 24 y.o. female who had concerns including Pain of the Left Knee.    HPI    HPI 05/22/2025  History of Present Illness    CHIEF COMPLAINT:  - Right knee instability    HPI:  Client presents for follow-up of a knee injury s/p ACL and posterolateral corner reconstruction surgery in December. She reports knee instability, particularly when loading it or lifting weights. XRs taken about two weeks ago showed increased opening of the knee joint when standing straight up. MRI results indicate that the popliteal part and a portion of the LCL graft have stretched out significantly, correlating with her reported instability. She is currently attending PT. She is concerned about the long-term implications of the instability and its potential impact on cartilage wear. She also mentions numbness at the surgical scar site but denies any numbness or tingling extending down to the foot.    PREVIOUS TREATMENTS:  - PT: She is currently attending PT sessions.    WORK STATUS:  - Student planning to return to school in August  - Concerned about potential interference with clinical rotations if surgery is required         CHIEF COMPLAINT:  - Right knee instability    HPI:  Client presents for follow-up of a knee injury s/p ACL and posterolateral corner reconstruction surgery in December. She reports knee instability, particularly when loading it or lifting weights. XRs taken about two weeks ago showed increased opening of the knee joint when standing straight up. MRI results indicate that the popliteal part and a portion of the LCL graft have stretched out significantly, correlating with her reported instability. She is currently attending PT. She is concerned about the long-term implications of the instability and its potential impact on cartilage wear. She also mentions numbness at the surgical scar site but denies any numbness or tingling extending down to the foot.    PREVIOUS  TREATMENTS:  - PT: She is currently attending PT sessions.    WORK STATUS:  - Student planning to return to school in August  - Concerned about potential interference with clinical rotations if surgery is required HPI:  Client presents for follow-up after a recent fall at home. She slipped while walking through the house after returning from the gym, landing directly on her left leg. The injury occurred a few weeks ago, and symptoms have remained consistent since then. She experienced tenderness in the anterior knee and reports frequent popping. There is slight swelling, and some pain in the posterior knee when extending. She was able to ambulate immediately after the fall without assistance. She reports knee stability and trusts it when standing on her left leg alone. She denies pain on the lateral knee but mentions some tenderness anteriorly. She is currently undergoing PT.    She denies hearing a pop, tear, or crack during the fall, and did not experience the same numbness as with her previous injury.    PREVIOUS TREATMENTS:  - PT: Currently ongoing           HPI 1/25/2025  History of Present Illness    CHIEF COMPLAINT:  - Multiple ligament repair follow-up    HPI:  Client presents for a 6-week post-operative follow-up after knee surgery involving multiple ligament repairs, including ACL reconstruction and work on the posterior lateral corner. She reports overall improvement in her knee's condition, noting it feels different. She has been following post-operative protocols, including wearing a knee brace consistently, even during sleep. Client began 50% weight-bearing yesterday as instructed. She has been cautious during recent snowfall, with family members discouraging crutch use outdoors.    She has been attending physical therapy sessions, working on regaining range of motion and strength. Client expresses some impatience with the recovery timeline, inquiring about the possibility of returning to school in  August, approximately 7-8 months post-surgery. She asks about applying force during knee bending exercises, indicating she has been performing these independently without assistance from physical therapists.    Client denies any significant stiffness in the knee that would require immediate intervention.    PREVIOUS TREATMENTS:  - Client has been using crutches and wearing a knee brace, including while sleeping, for the past 6 weeks.  - Client started physical therapy, which has been ongoing.  - Client began putting 50% weight on the affected leg as of yesterday.    WORK STATUS:  - Student, inquiring about returning to school in August (7-8 months post-surgery)         PROCEDURE PERFORMED by Jhony Berry MD on 12/12/2024:   Left ACL reconstruction with quadriceps tendon autograft   Left posterolateral corner reconstruction with allograft  Left knee lateral extra-articular tenodesis  Left knee arthroscopic medial meniscus repair   Left knee arthroscopic lateral meniscus repair   Left knee arthroscopic synovectomy   Left knee arthroscopic lysis of adhesions   Left knee open peroneal nerve neurolysis    Past Medical History:   Diagnosis Date    ADHD (attention deficit hyperactivity disorder)     Anxiety     Constipation     Depression     GERD (gastroesophageal reflux disease)     IBS (irritable bowel syndrome)     Ovarian cyst     Schizophrenia in children        Current Outpatient Medications on File Prior to Visit   Medication Sig Dispense Refill    acetaminophen (TYLENOL) 325 MG tablet Take 325 mg by mouth every 6 (six) hours as needed.      biotin 1 mg tablet Take 1,000 mcg by mouth twice a week.      butalbital-acetaminophen-caff -40 mg Cap Take 1 capsule by mouth daily as needed.      ergocalciferol (ERGOCALCIFEROL) 50,000 unit Cap Take 1 capsule (50,000 Units total) by mouth every 7 days. 12 capsule 3    aspirin (ECOTRIN) 81 MG EC tablet Take 1 tablet (81 mg total) by mouth once daily. 42 tablet 0     celecoxib (CELEBREX) 200 MG capsule Take 1 capsule (200 mg total) by mouth 2 (two) times daily with meals. (Patient not taking: Reported on 5/22/2025) 60 capsule 0    cetirizine (ZYRTEC) 10 MG tablet Take 10 mg by mouth once daily. (Patient not taking: Reported on 5/22/2025)      ergocalciferol (ERGOCALCIFEROL) 50,000 unit Cap TAKE ONE CAPSULE BY MOUTH EVERY 7 DAYS (Patient not taking: Reported on 5/22/2025) 12 capsule 3    Lactobacillus rhamnosus GG (CULTURELLE) 10 billion cell capsule Take 1 capsule by mouth once daily. (Patient not taking: Reported on 5/22/2025)      methocarbamoL (ROBAXIN) 500 MG Tab Take 1 tablet (500 mg total) by mouth 3 (three) times daily as needed (muscle spasms). (Patient not taking: Reported on 5/22/2025) 30 tablet 0    mupirocin (BACTROBAN) 2 % ointment Apply topically 3 (three) times daily. (Patient not taking: Reported on 5/22/2025) 15 g 0    omega-3/dha/epa/ala/vitamin D3 (OMEGA-3-DHA-EPA-ALA-VIT D3 ORAL) Take by mouth. (Patient not taking: Reported on 5/22/2025)      oxyCODONE (ROXICODONE) 5 MG immediate release tablet Take 1 tablet (5 mg total) by mouth every 6 (six) hours as needed for Pain. (Patient not taking: Reported on 5/22/2025) 21 tablet 0    polyethylene glycol (GLYCOLAX) 17 gram/dose powder Take 17 g by mouth once daily. (Patient not taking: Reported on 5/22/2025) 510 g 11    promethazine (PHENERGAN) 25 MG tablet Take 1 tablet (25 mg total) by mouth every 6 (six) hours as needed for Nausea. (Patient not taking: Reported on 5/22/2025) 30 tablet 0    triamcinolone acetonide 0.1% (KENALOG) 0.1 % ointment Apply topically 2 (two) times daily. To affected area (Patient not taking: Reported on 5/22/2025) 80 g 0     No current facility-administered medications on file prior to visit.       Past Surgical History:   Procedure Laterality Date    ARTHROSCOPY,KNEE,WITH MENISCUS REPAIR Left 12/12/2024    Procedure: ARTHROSCOPY,KNEE,WITH MENISCUS REPAIR;  Surgeon: Jhony Berry MD;   Location: [facility];  Service: Orthopedics;  Laterality: Left;    DECOMPRESSION OF NERVE Left 12/12/2024    Procedure: DECOMPRESSION, NERVE;  Surgeon: Jhony Berry MD;  [facility];  Service: Orthopedics;  Laterality: Left;    ESOPHAGOGASTRODUODENOSCOPY N/A 2/8/2022    Procedure: EGD (ESOPHAGOGASTRODUODENOSCOPY);  Surgeon: Berkley Mckenna MD;  Location: [facility];  Service: Endoscopy;  Laterality: N/A;    RECONSTRUCTION OF ANTERIOR CRUCIATE LIGAMENT USING GRAFT Left 12/12/2024    Procedure: RECONSTRUCTION, KNEE, ACL, QUAD TENDON AUTOGRAFT;  Surgeon: Jhony Berry MD;  [facility];  Service: Orthopedics;  Laterality: Left;    RECONSTRUCTION OF LIGAMENT Left 12/12/2024    Procedure: RECONSTRUCTION, POSTEROLATERAL CORNER (PLC) WITH ALLOGRAFT;  Surgeon: Jhony Berry MD;  [facility];  Service: Orthopedics;  Laterality: Left;    RECONSTRUCTION, LIGAMENT, KNEE, EXTRA-ARTICULAR Left 12/12/2024    Procedure: RECONSTRUCTION, LIGAMENT, KNEE, EXTRA-ARTICULAR;  Surgeon: Jhony Berry MD;  [address];  Service: Orthopedics;  Laterality: Left;    SYNOVECTOMY, KNEE, LIMITED, ARTHROSCOPIC Left 12/12/2024    Procedure: SYNOVECTOMY, KNEE, LIMITED, ARTHROSCOPIC;  Surgeon: Jhony Berry MD;  Location: [address];  Service: Orthopedics;  Laterality: Left;    WISDOM TOOTH EXTRACTION         Family History   Problem Relation Name Age of Onset    Asthma Mother      Diabetes Father         Social History     Socioeconomic History    Marital status: Single   Tobacco Use    Smoking status: Never     Passive exposure: Yes    Smokeless tobacco: Never   Substance and Sexual Activity    Alcohol use: No    Drug use: Never    Sexual activity: Yes     Partners: Male       Review of Systems   Constitutional: Negative.   HENT: Negative.     Eyes: Negative.    Cardiovascular: Negative.    Respiratory: Negative.     Endocrine: Negative.     Hematologic/Lymphatic: Negative.    Skin: Negative.    Musculoskeletal:  Positive for joint pain, joint swelling and muscle weakness. Negative for falls, myalgias and stiffness.   Neurological: Negative.    Psychiatric/Behavioral: Negative.     Allergic/Immunologic: Negative.                  Objective:     General: Felicity MENJIVAR is well-developed, well-nourished, appears stated age, in no acute distress, alert and oriented to time, place and person.     General    Nursing note and vitals reviewed.  Constitutional: She is oriented to person, place, and time. She appears well-developed and well-nourished. No distress.   HENT:   Head: Normocephalic and atraumatic.   Nose: Nose normal.   Eyes: EOM are normal.   Cardiovascular:  Intact distal pulses.            Pulmonary/Chest: Effort normal. No respiratory distress.   Neurological: She is alert and oriented to person, place, and time.   Psychiatric: She has a normal mood and affect. Her behavior is normal. Judgment and thought content normal.     General Musculoskeletal Exam   Gait: normal       Right Knee Exam     Tenderness   The patient is experiencing no tenderness.     Range of Motion   Extension:  -10   Flexion:  140     Tests   Ligament Examination   Lachman: normal (-1 to 2mm)   PCL-Posterior Drawer: normal (0 to 2mm)     MCL - Valgus: normal (0 to 2mm)  LCL - Varus: normal    Left Knee Exam     Inspection   Erythema: absent  Scars: present  Swelling: absent  Effusion: absent  Deformity: absent  Bruising: absent    Tenderness   The patient tender to palpation of the lateral joint line.    Range of Motion   Extension:  -10   Flexion:  130     Tests   Stability   Lachman: normal (-1 to 2mm)   PCL-Posterior Drawer: normal (0 to 2mm)  MCL - Valgus: normal (0 to 2mm)  LCL - Varus: abnormal - grade II    Other   Sensation: normal    Muscle Strength   Left Lower Extremity   Quadriceps:  4/5   Hamstrin/5     Vascular Exam       Left Pulses  Dorsalis Pedis:       2+  Posterior Tibial:      2+        Edema  Left Lower Leg: absent      Imaging:  X-rays of the left knee from 01/25/2025 personally viewed by me on that day these include nonweightbearing AP and lateral.  Evidence of multi ligament knee reconstruction.  No noted complications.    X-rays of the bilateral knees from 05/08/2025 personally viewed by me on that day these weight-bearing AP, PA flexion lateral, Merchant views joint is well-maintained.  No fracture.  Evidence of multi ligament reconstruction on the left.  There is increased in the space of the lateral compartment on the weight-bearing views of the left knee when compared to the right.  The left knee from 05/2025 personally viewed by me 05/22/2025.  ACL reconstruction appears intact.  Increased signal in the posterior horn medial meniscus but no new tear.  Lateral meniscus was intact.  Full-thickness chondral defect to the lateral femoral condyle.  Joint effusion with some mild synovitis.  Evidence of posterolateral corner reconstruction with attenuation of the popliteal limb and laxity in the LCL limb.    Assessment:   Felicity Cohen is a 24 y.o. female status post above with a fall with lateral structure injury.  Encounter Diagnoses   Name Primary?    Rupture of anterior cruciate ligament of left knee, subsequent encounter Yes    Injury of posterolateral corner of left knee, subsequent encounter           Plan:   The diagnosis treatment options with the patient all her questions were answered I showed her the MRI and reviewed the findings with her.  Unfortunately, she does have some instability.  We discussed nonoperative management with placement in the brace and physical therapy with re-evaluation.  The alternative would be surgical intervention with diagnostic arthroscopy and revision posterolateral corner reconstruction with allograft, bio brace, and possible internal brace.  Additionally, a revision peroneal nerve neurolysis would be performed.   The procedure, risks, benefits, alternatives, and rehabilitation were discussed at length and all her questions were answered we will plan on proceeding on 06/05/2025 at [facility].  She does not need preoperative clearance.    [id number] - we performed a custom orthotic / brace adjustment, fitting and training with the patient. The patient demonstrated understanding and proper care. This was performed for 15 minutes.     This note was generated with the assistance of ambient listening technology. Verbal consent was obtained by the patient and accompanying visitor(s) for the recording of patient appointment to facilitate this note. I attest to having reviewed and edited the generated note for accuracy, though some syntax or spelling errors may persist. Please contact the author of this note for any clarification.      All of the patient's questions were answered. Patient was advised to call the clinic or contact me through the patient portal for any questions or concerns.         Patient questionnaires may have been collected.

## 2025-05-22 NOTE — H&P (VIEW-ONLY)
Subjective:     Chief Complaint: Felicity Cohen is a 24 y.o. female who had concerns including Pain of the Left Knee.    HPI    HPI 05/22/2025  History of Present Illness    CHIEF COMPLAINT:  - Right knee instability    HPI:  Client presents for follow-up of a knee injury s/p ACL and posterolateral corner reconstruction surgery in December. She reports knee instability, particularly when loading it or lifting weights. XRs taken about two weeks ago showed increased opening of the knee joint when standing straight up. MRI results indicate that the popliteal part and a portion of the LCL graft have stretched out significantly, correlating with her reported instability. She is currently attending PT. She is concerned about the long-term implications of the instability and its potential impact on cartilage wear. She also mentions numbness at the surgical scar site but denies any numbness or tingling extending down to the foot.    PREVIOUS TREATMENTS:  - PT: She is currently attending PT sessions.    WORK STATUS:  - Student planning to return to school in August  - Concerned about potential interference with clinical rotations if surgery is required         CHIEF COMPLAINT:  - Right knee instability    HPI:  Client presents for follow-up of a knee injury s/p ACL and posterolateral corner reconstruction surgery in December. She reports knee instability, particularly when loading it or lifting weights. XRs taken about two weeks ago showed increased opening of the knee joint when standing straight up. MRI results indicate that the popliteal part and a portion of the LCL graft have stretched out significantly, correlating with her reported instability. She is currently attending PT. She is concerned about the long-term implications of the instability and its potential impact on cartilage wear. She also mentions numbness at the surgical scar site but denies any numbness or tingling extending down to the foot.    PREVIOUS  TREATMENTS:  - PT: She is currently attending PT sessions.    WORK STATUS:  - Student planning to return to school in August  - Concerned about potential interference with clinical rotations if surgery is required HPI:  Client presents for follow-up after a recent fall at home. She slipped while walking through the house after returning from the gym, landing directly on her left leg. The injury occurred a few weeks ago, and symptoms have remained consistent since then. She experienced tenderness in the anterior knee and reports frequent popping. There is slight swelling, and some pain in the posterior knee when extending. She was able to ambulate immediately after the fall without assistance. She reports knee stability and trusts it when standing on her left leg alone. She denies pain on the lateral knee but mentions some tenderness anteriorly. She is currently undergoing PT.    She denies hearing a pop, tear, or crack during the fall, and did not experience the same numbness as with her previous injury.    PREVIOUS TREATMENTS:  - PT: Currently ongoing           HPI 1/25/2025  History of Present Illness    CHIEF COMPLAINT:  - Multiple ligament repair follow-up    HPI:  Client presents for a 6-week post-operative follow-up after knee surgery involving multiple ligament repairs, including ACL reconstruction and work on the posterior lateral corner. She reports overall improvement in her knee's condition, noting it feels different. She has been following post-operative protocols, including wearing a knee brace consistently, even during sleep. Client began 50% weight-bearing yesterday as instructed. She has been cautious during recent snowfall, with family members discouraging crutch use outdoors.    She has been attending physical therapy sessions, working on regaining range of motion and strength. Client expresses some impatience with the recovery timeline, inquiring about the possibility of returning to school in  August, approximately 7-8 months post-surgery. She asks about applying force during knee bending exercises, indicating she has been performing these independently without assistance from physical therapists.    Client denies any significant stiffness in the knee that would require immediate intervention.    PREVIOUS TREATMENTS:  - Client has been using crutches and wearing a knee brace, including while sleeping, for the past 6 weeks.  - Client started physical therapy, which has been ongoing.  - Client began putting 50% weight on the affected leg as of yesterday.    WORK STATUS:  - Student, inquiring about returning to school in August (7-8 months post-surgery)         PROCEDURE PERFORMED by Jhony Berry MD on 12/12/2024:   Left ACL reconstruction with quadriceps tendon autograft   Left posterolateral corner reconstruction with allograft  Left knee lateral extra-articular tenodesis  Left knee arthroscopic medial meniscus repair   Left knee arthroscopic lateral meniscus repair   Left knee arthroscopic synovectomy   Left knee arthroscopic lysis of adhesions   Left knee open peroneal nerve neurolysis    Past Medical History:   Diagnosis Date    ADHD (attention deficit hyperactivity disorder)     Anxiety     Constipation     Depression     GERD (gastroesophageal reflux disease)     IBS (irritable bowel syndrome)     Ovarian cyst     Schizophrenia in children        Current Outpatient Medications on File Prior to Visit   Medication Sig Dispense Refill    acetaminophen (TYLENOL) 325 MG tablet Take 325 mg by mouth every 6 (six) hours as needed.      biotin 1 mg tablet Take 1,000 mcg by mouth twice a week.      butalbital-acetaminophen-caff -40 mg Cap Take 1 capsule by mouth daily as needed.      ergocalciferol (ERGOCALCIFEROL) 50,000 unit Cap Take 1 capsule (50,000 Units total) by mouth every 7 days. 12 capsule 3    aspirin (ECOTRIN) 81 MG EC tablet Take 1 tablet (81 mg total) by mouth once daily. 42 tablet 0     celecoxib (CELEBREX) 200 MG capsule Take 1 capsule (200 mg total) by mouth 2 (two) times daily with meals. (Patient not taking: Reported on 5/22/2025) 60 capsule 0    cetirizine (ZYRTEC) 10 MG tablet Take 10 mg by mouth once daily. (Patient not taking: Reported on 5/22/2025)      ergocalciferol (ERGOCALCIFEROL) 50,000 unit Cap TAKE ONE CAPSULE BY MOUTH EVERY 7 DAYS (Patient not taking: Reported on 5/22/2025) 12 capsule 3    Lactobacillus rhamnosus GG (CULTURELLE) 10 billion cell capsule Take 1 capsule by mouth once daily. (Patient not taking: Reported on 5/22/2025)      methocarbamoL (ROBAXIN) 500 MG Tab Take 1 tablet (500 mg total) by mouth 3 (three) times daily as needed (muscle spasms). (Patient not taking: Reported on 5/22/2025) 30 tablet 0    mupirocin (BACTROBAN) 2 % ointment Apply topically 3 (three) times daily. (Patient not taking: Reported on 5/22/2025) 15 g 0    omega-3/dha/epa/ala/vitamin D3 (OMEGA-3-DHA-EPA-ALA-VIT D3 ORAL) Take by mouth. (Patient not taking: Reported on 5/22/2025)      oxyCODONE (ROXICODONE) 5 MG immediate release tablet Take 1 tablet (5 mg total) by mouth every 6 (six) hours as needed for Pain. (Patient not taking: Reported on 5/22/2025) 21 tablet 0    polyethylene glycol (GLYCOLAX) 17 gram/dose powder Take 17 g by mouth once daily. (Patient not taking: Reported on 5/22/2025) 510 g 11    promethazine (PHENERGAN) 25 MG tablet Take 1 tablet (25 mg total) by mouth every 6 (six) hours as needed for Nausea. (Patient not taking: Reported on 5/22/2025) 30 tablet 0    triamcinolone acetonide 0.1% (KENALOG) 0.1 % ointment Apply topically 2 (two) times daily. To affected area (Patient not taking: Reported on 5/22/2025) 80 g 0     No current facility-administered medications on file prior to visit.       Past Surgical History:   Procedure Laterality Date    ARTHROSCOPY,KNEE,WITH MENISCUS REPAIR Left 12/12/2024    Procedure: ARTHROSCOPY,KNEE,WITH MENISCUS REPAIR;  Surgeon: Jhony Berry MD;   Location: [facility];  Service: Orthopedics;  Laterality: Left;    DECOMPRESSION OF NERVE Left 12/12/2024    Procedure: DECOMPRESSION, NERVE;  Surgeon: Jhony Berry MD;  [facility];  Service: Orthopedics;  Laterality: Left;    ESOPHAGOGASTRODUODENOSCOPY N/A 2/8/2022    Procedure: EGD (ESOPHAGOGASTRODUODENOSCOPY);  Surgeon: Berkley Mckenna MD;  Location: [facility];  Service: Endoscopy;  Laterality: N/A;    RECONSTRUCTION OF ANTERIOR CRUCIATE LIGAMENT USING GRAFT Left 12/12/2024    Procedure: RECONSTRUCTION, KNEE, ACL, QUAD TENDON AUTOGRAFT;  Surgeon: Jhony Berry MD;  [facility];  Service: Orthopedics;  Laterality: Left;    RECONSTRUCTION OF LIGAMENT Left 12/12/2024    Procedure: RECONSTRUCTION, POSTEROLATERAL CORNER (PLC) WITH ALLOGRAFT;  Surgeon: Jhony Berry MD;  [facility];  Service: Orthopedics;  Laterality: Left;    RECONSTRUCTION, LIGAMENT, KNEE, EXTRA-ARTICULAR Left 12/12/2024    Procedure: RECONSTRUCTION, LIGAMENT, KNEE, EXTRA-ARTICULAR;  Surgeon: Jhony Berry MD;  [address];  Service: Orthopedics;  Laterality: Left;    SYNOVECTOMY, KNEE, LIMITED, ARTHROSCOPIC Left 12/12/2024    Procedure: SYNOVECTOMY, KNEE, LIMITED, ARTHROSCOPIC;  Surgeon: Jhony Berry MD;  Location: [address];  Service: Orthopedics;  Laterality: Left;    WISDOM TOOTH EXTRACTION         Family History   Problem Relation Name Age of Onset    Asthma Mother      Diabetes Father         Social History     Socioeconomic History    Marital status: Single   Tobacco Use    Smoking status: Never     Passive exposure: Yes    Smokeless tobacco: Never   Substance and Sexual Activity    Alcohol use: No    Drug use: Never    Sexual activity: Yes     Partners: Male       Review of Systems   Constitutional: Negative.   HENT: Negative.     Eyes: Negative.    Cardiovascular: Negative.    Respiratory: Negative.     Endocrine: Negative.     Hematologic/Lymphatic: Negative.    Skin: Negative.    Musculoskeletal:  Positive for joint pain, joint swelling and muscle weakness. Negative for falls, myalgias and stiffness.   Neurological: Negative.    Psychiatric/Behavioral: Negative.     Allergic/Immunologic: Negative.                  Objective:     General: Felicity MENJIVAR is well-developed, well-nourished, appears stated age, in no acute distress, alert and oriented to time, place and person.     General    Nursing note and vitals reviewed.  Constitutional: She is oriented to person, place, and time. She appears well-developed and well-nourished. No distress.   HENT:   Head: Normocephalic and atraumatic.   Nose: Nose normal.   Eyes: EOM are normal.   Cardiovascular:  Intact distal pulses.            Pulmonary/Chest: Effort normal. No respiratory distress.   Neurological: She is alert and oriented to person, place, and time.   Psychiatric: She has a normal mood and affect. Her behavior is normal. Judgment and thought content normal.     General Musculoskeletal Exam   Gait: normal       Right Knee Exam     Tenderness   The patient is experiencing no tenderness.     Range of Motion   Extension:  -10   Flexion:  140     Tests   Ligament Examination   Lachman: normal (-1 to 2mm)   PCL-Posterior Drawer: normal (0 to 2mm)     MCL - Valgus: normal (0 to 2mm)  LCL - Varus: normal    Left Knee Exam     Inspection   Erythema: absent  Scars: present  Swelling: absent  Effusion: absent  Deformity: absent  Bruising: absent    Tenderness   The patient tender to palpation of the lateral joint line.    Range of Motion   Extension:  -10   Flexion:  130     Tests   Stability   Lachman: normal (-1 to 2mm)   PCL-Posterior Drawer: normal (0 to 2mm)  MCL - Valgus: normal (0 to 2mm)  LCL - Varus: abnormal - grade II    Other   Sensation: normal    Muscle Strength   Left Lower Extremity   Quadriceps:  4/5   Hamstrin/5     Vascular Exam       Left Pulses  Dorsalis Pedis:       2+  Posterior Tibial:      2+        Edema  Left Lower Leg: absent      Imaging:  X-rays of the left knee from 01/25/2025 personally viewed by me on that day these include nonweightbearing AP and lateral.  Evidence of multi ligament knee reconstruction.  No noted complications.    X-rays of the bilateral knees from 05/08/2025 personally viewed by me on that day these weight-bearing AP, PA flexion lateral, Merchant views joint is well-maintained.  No fracture.  Evidence of multi ligament reconstruction on the left.  There is increased in the space of the lateral compartment on the weight-bearing views of the left knee when compared to the right.  The left knee from 05/2025 personally viewed by me 05/22/2025.  ACL reconstruction appears intact.  Increased signal in the posterior horn medial meniscus but no new tear.  Lateral meniscus was intact.  Full-thickness chondral defect to the lateral femoral condyle.  Joint effusion with some mild synovitis.  Evidence of posterolateral corner reconstruction with attenuation of the popliteal limb and laxity in the LCL limb.    Assessment:   Felicity Cohen is a 24 y.o. female status post above with a fall with lateral structure injury.  Encounter Diagnoses   Name Primary?    Rupture of anterior cruciate ligament of left knee, subsequent encounter Yes    Injury of posterolateral corner of left knee, subsequent encounter           Plan:   The diagnosis treatment options with the patient all her questions were answered I showed her the MRI and reviewed the findings with her.  Unfortunately, she does have some instability.  We discussed nonoperative management with placement in the brace and physical therapy with re-evaluation.  The alternative would be surgical intervention with diagnostic arthroscopy and revision posterolateral corner reconstruction with allograft, bio brace, and possible internal brace.  Additionally, a revision peroneal nerve neurolysis would be performed.   The procedure, risks, benefits, alternatives, and rehabilitation were discussed at length and all her questions were answered we will plan on proceeding on 06/05/2025 at [facility].  She does not need preoperative clearance.    [id number] - we performed a custom orthotic / brace adjustment, fitting and training with the patient. The patient demonstrated understanding and proper care. This was performed for 15 minutes.     This note was generated with the assistance of ambient listening technology. Verbal consent was obtained by the patient and accompanying visitor(s) for the recording of patient appointment to facilitate this note. I attest to having reviewed and edited the generated note for accuracy, though some syntax or spelling errors may persist. Please contact the author of this note for any clarification.      All of the patient's questions were answered. Patient was advised to call the clinic or contact me through the patient portal for any questions or concerns.         Patient questionnaires may have been collected.

## 2025-05-27 ENCOUNTER — TELEPHONE (OUTPATIENT)
Dept: SPORTS MEDICINE | Facility: CLINIC | Age: 25
End: 2025-05-27
Payer: MEDICAID

## 2025-05-27 NOTE — TELEPHONE ENCOUNTER
----- Message from Rosalia sent at 5/27/2025  7:55 AM CDT -----  Name of Caller: Nature of Call: requesting a call back Best Call Back Number: [phone] Additional Information:Felicity Cohen calling regarding Patient Advice for wanting to ask questions about the surgery set for 06/05/25

## 2025-05-28 ENCOUNTER — CLINICAL SUPPORT (OUTPATIENT)
Dept: REHABILITATION | Facility: HOSPITAL | Age: 25
End: 2025-05-28
Payer: MEDICAID

## 2025-05-28 ENCOUNTER — PATIENT MESSAGE (OUTPATIENT)
Dept: SPORTS MEDICINE | Facility: CLINIC | Age: 25
End: 2025-05-28
Payer: MEDICAID

## 2025-05-28 DIAGNOSIS — M25.562 ACUTE PAIN OF LEFT KNEE: Primary | ICD-10-CM

## 2025-05-28 PROCEDURE — 97110 THERAPEUTIC EXERCISES: CPT

## 2025-05-28 NOTE — PROGRESS NOTES
Outpatient Rehab  Physical Therapy Treatment Note     Name: Feliciyt Cohen  Clinic Number: [id number]    Therapy Diagnosis:   Encounter Diagnosis   Name Primary?    Acute pain of left knee Yes           Physician: Jhony Berry MD    Visit Date: 5/28/2025    Physician Orders: PT Eval and Treat   Medical Diagnosis from Referral:   S83.512A (ICD-10-CM) - Rupture of anterior cruciate ligament of left knee, initial encounter   S83.422A (ICD-10-CM) - Tear of lateral collateral ligament of left knee, initial encounter   S83.8X2D (ICD-10-CM) - Acute medial meniscal injury of left knee, subsequent encounter      Evaluation Date: 10/24/2024  Re-evaluation date: 12/13/2024  Authorization Period Expiration: 12/31/25  Plan of Care Expiration: 12/31/25  Visit # / Visits authorized: 31/44  FOTO: 3/3     Time In: 1:00 pm  Time Out: 1:55 pm  Total Billable Time: 55 minutes   PT tech extender used this session    Precautions: Standard, post op  PROCEDURE PERFORMED:   Left ACL reconstruction with quadriceps tendon autograft   Left posterolateral corner reconstruction with allograft  Left knee lateral extra-articular tenodesis  Left knee arthroscopic medial meniscus repair   Left knee arthroscopic lateral meniscus repair   Left knee arthroscopic synovectomy   Left knee arthroscopic lysis of adhesions   Left knee open peroneal nerve neurolysis      SUBJECTIVE     Pt reports she saw Dr. Berry and is suppose to have sx next week.   She was compliant with home exercise program.  Response to previous treatment: none  Functional change:  on going    Pain: not quantified/10  Location: left knee      OBJECTIVE   3/12/25    L knee ROM:  5-0-115 degrees    Treatment       Felicity received the treatments listed below:      Therapeutic exercises to develop strength, endurance, ROM, posture, and core stabilization for 55 minutes including:  Rogue x 10 min  Shuttle dbl 100# 3x10  Shuttle SL #75 3x10  SAQ #3 3x10-np  Static lunge  "3x12-np  LAQ hammer 4x8 #5  Step up 4" 3x10-np  Standing off side SLR 3x10-np  Sled push #90 3 laps-np  DL heel raises 100# 3x10  Shuttle SideLying hip ext #12 5x6-np  Sit to stand 20" box  L bias 3x10 -np  Inferior patella mobs in flexion on table -np  Matrix HS curls 15# 3x fatigue  SL hip abd 3# 3x10 with 5 sec hold-np  Pt education x15 min     *BILLED PER MEDICAID GUIDELINES*      Patient Education and Home Exercises     Home Exercises Provided and Patient Education Provided     Education provided:   - cont HEP, heel slides with brace donned    Written Home Exercises Provided: Patient instructed to cont prior HEP. Exercises were reviewed and Felicity was able to demonstrate them prior to the end of the session.  [place] demonstrated good  understanding of the education provided. See EMR under Patient Instructions for exercises provided during therapy sessions    ASSESSMENT     Felicity was able to continue strengthening to progress knee stability strength wise. Pt asked a lot of questions regarding possible new surgery and healing time lines secondary to clinics starting in the fall for school and needing to be on her feet. Will continue with strengthening unless pt moves forward with knee scope next week.     Felicity Is progressing well towards her goals.     Pt prognosis is Good.     Pt will continue to benefit from skilled outpatient physical therapy to address the deficits listed in the problem list box on initial evaluation, provide pt/family education and to maximize pt's level of independence in the home and community environment.     Pt's spiritual, cultural and educational needs considered and pt agreeable to plan of care and goals.     Anticipated barriers to physical therapy: none    Goals:   Short Term Goals: 6 weeks Post op  - Pt will increase ROM to +5 L knee hyperextension, 90 deg flex L knee (Progressing, not met)  - Decrease Pain to 3/10 as worst with all PT interventions (Progressing, not " met)  - Pt to self correct posture and gait with no AD or brace and minimal cues (Progressing, not met)  - Pt independent with HEP with progressions. (Met)     Long Term Goals ( 1 year):(Progressing, not met)  - Pt will increase ROM to 0 deg L knee hyperextension and 110 deg flexion  - Pt will increase strength to 5/5 L knee in all planes  - Decrease Pain to 0/10 as worst with stairs, walking and lifting weights  - Pt to return to 80% PLOF    PLAN     Plan of care Certification: 12/13/2024 to 12/31/25.     Outpatient Physical Therapy 2 times weekly for 52 weeks to include the following interventions: Gait Training, Manual Therapy, Moist Heat/ Ice, Neuromuscular Re-ed, Patient Education, Therapeutic Activities, and Therapeutic Exercise.    Deborah Coe, PT,

## 2025-06-03 RX ORDER — TIZANIDINE 4 MG/1
4 TABLET ORAL EVERY 8 HOURS PRN
COMMUNITY
Start: 2025-05-18

## 2025-06-03 RX ORDER — OMEPRAZOLE 20 MG/1
20 TABLET, DELAYED RELEASE ORAL DAILY
COMMUNITY

## 2025-06-03 NOTE — PRE-PROCEDURE INSTRUCTIONS
PreOp Instructions given:   - Verbal medication information (what to hold and what to take)   - NPO guidelines 2200   NO SOLID FOOD, MILK OR MILK PRODUCTS 8 HOURS PRIOR TO SX START TIME.  YOU MAY ONLY HAVE SIPS OF WATER WITH MORNING MEDICATIONS (1) HOUR PRIOR TO ARRIVAL TO HOSPITAL.   - Arrival place directions given; time to be given the day before procedure by the   Surgeon's Office   [facility]  - Bathing with antibacterial soap   - Don't wear any jewelry or bring any valuables AM of surgery   - No makeup or moisturizer to face   - No perfume/cologne, powder, lotions or aftershave   Pt. verbalized understanding.   Pt denies any h/o Anesthesia/Sedation complications or side effects.  Patient does not know arrival time.  Explained that this information comes from the surgeon's office and if they haven't heard from them by 2 or 3 pm to call the office.  Patient stated an understanding.        Detail Level: Zone Plan: Daily SPF 30-50

## 2025-06-04 ENCOUNTER — ANESTHESIA EVENT (OUTPATIENT)
Dept: SURGERY | Facility: HOSPITAL | Age: 25
End: 2025-06-04
Payer: MEDICAID

## 2025-06-05 ENCOUNTER — ANESTHESIA (OUTPATIENT)
Dept: SURGERY | Facility: HOSPITAL | Age: 25
End: 2025-06-05
Payer: MEDICAID

## 2025-06-05 ENCOUNTER — HOSPITAL ENCOUNTER (OUTPATIENT)
Facility: HOSPITAL | Age: 25
Discharge: HOME | End: 2025-06-05
Attending: STUDENT IN AN ORGANIZED HEALTH CARE EDUCATION/TRAINING PROGRAM | Admitting: STUDENT IN AN ORGANIZED HEALTH CARE EDUCATION/TRAINING PROGRAM
Payer: MEDICAID

## 2025-06-05 VITALS
BODY MASS INDEX: 48.82 KG/M2 | TEMPERATURE: 98 F | OXYGEN SATURATION: 93 % | DIASTOLIC BLOOD PRESSURE: 87 MMHG | HEIGHT: 65 IN | RESPIRATION RATE: 18 BRPM | HEART RATE: 99 BPM | SYSTOLIC BLOOD PRESSURE: 153 MMHG | WEIGHT: 293 LBS

## 2025-06-05 DIAGNOSIS — S89.92XS: ICD-10-CM

## 2025-06-05 DIAGNOSIS — Z00.00 ROUTINE ADULT HEALTH MAINTENANCE: ICD-10-CM

## 2025-06-05 DIAGNOSIS — S83.512A RUPTURE OF ANTERIOR CRUCIATE LIGAMENT OF LEFT KNEE, INITIAL ENCOUNTER: ICD-10-CM

## 2025-06-05 DIAGNOSIS — Z98.890 S/P ACL RECONSTRUCTION: ICD-10-CM

## 2025-06-05 DIAGNOSIS — S89.92XA INJURY OF POSTEROLATERAL CORNER OF LEFT KNEE, INITIAL ENCOUNTER: Primary | ICD-10-CM

## 2025-06-05 LAB
B-HCG UR QL: NEGATIVE
CTP QC/QA: YES

## 2025-06-05 PROCEDURE — 27800903 OPTIME MED/SURG SUP & DEVICES OTHER IMPLANTS: Performed by: STUDENT IN AN ORGANIZED HEALTH CARE EDUCATION/TRAINING PROGRAM

## 2025-06-05 PROCEDURE — 27427 RECONSTRUCTION KNEE: CPT | Mod: 51,LT,, | Performed by: STUDENT IN AN ORGANIZED HEALTH CARE EDUCATION/TRAINING PROGRAM

## 2025-06-05 PROCEDURE — 64448 NJX AA&/STRD FEM NRV NFS IMG: CPT | Mod: 59,LT,, | Performed by: STUDENT IN AN ORGANIZED HEALTH CARE EDUCATION/TRAINING PROGRAM

## 2025-06-05 PROCEDURE — 71000045 HC DOSC ROUTINE RECOVERY EA ADD'L HR: Performed by: STUDENT IN AN ORGANIZED HEALTH CARE EDUCATION/TRAINING PROGRAM

## 2025-06-05 PROCEDURE — 37000009 HC ANESTHESIA EA ADD 15 MINS: Performed by: STUDENT IN AN ORGANIZED HEALTH CARE EDUCATION/TRAINING PROGRAM

## 2025-06-05 PROCEDURE — 93005 ELECTROCARDIOGRAM TRACING: CPT

## 2025-06-05 PROCEDURE — C1762 CONN TISS, HUMAN(INC FASCIA): HCPCS | Performed by: STUDENT IN AN ORGANIZED HEALTH CARE EDUCATION/TRAINING PROGRAM

## 2025-06-05 PROCEDURE — 25000003 PHARM REV CODE 250: Performed by: STUDENT IN AN ORGANIZED HEALTH CARE EDUCATION/TRAINING PROGRAM

## 2025-06-05 PROCEDURE — 36000711: Performed by: STUDENT IN AN ORGANIZED HEALTH CARE EDUCATION/TRAINING PROGRAM

## 2025-06-05 PROCEDURE — 71000015 HC POSTOP RECOV 1ST HR: Performed by: STUDENT IN AN ORGANIZED HEALTH CARE EDUCATION/TRAINING PROGRAM

## 2025-06-05 PROCEDURE — 27201423 OPTIME MED/SURG SUP & DEVICES STERILE SUPPLY: Performed by: STUDENT IN AN ORGANIZED HEALTH CARE EDUCATION/TRAINING PROGRAM

## 2025-06-05 PROCEDURE — 63600175 PHARM REV CODE 636 W HCPCS: Performed by: STUDENT IN AN ORGANIZED HEALTH CARE EDUCATION/TRAINING PROGRAM

## 2025-06-05 PROCEDURE — 25000003 PHARM REV CODE 250

## 2025-06-05 PROCEDURE — 15738 MUSCLE-SKIN GRAFT LEG: CPT | Mod: LT,,, | Performed by: STUDENT IN AN ORGANIZED HEALTH CARE EDUCATION/TRAINING PROGRAM

## 2025-06-05 PROCEDURE — 64448 NJX AA&/STRD FEM NRV NFS IMG: CPT

## 2025-06-05 PROCEDURE — 64708 REVISE ARM/LEG NERVE: CPT | Mod: LT,,, | Performed by: SURGERY

## 2025-06-05 PROCEDURE — 15738 MUSCLE-SKIN GRAFT LEG: CPT | Mod: 82,LT,, | Performed by: STUDENT IN AN ORGANIZED HEALTH CARE EDUCATION/TRAINING PROGRAM

## 2025-06-05 PROCEDURE — 81025 URINE PREGNANCY TEST: CPT | Performed by: STUDENT IN AN ORGANIZED HEALTH CARE EDUCATION/TRAINING PROGRAM

## 2025-06-05 PROCEDURE — C1763 CONN TISS, NON-HUMAN: HCPCS | Performed by: STUDENT IN AN ORGANIZED HEALTH CARE EDUCATION/TRAINING PROGRAM

## 2025-06-05 PROCEDURE — 29881 ARTHRS KNE SRG MNISECTMY M/L: CPT | Mod: 51,LT,, | Performed by: STUDENT IN AN ORGANIZED HEALTH CARE EDUCATION/TRAINING PROGRAM

## 2025-06-05 PROCEDURE — 63600175 PHARM REV CODE 636 W HCPCS

## 2025-06-05 PROCEDURE — C1713 ANCHOR/SCREW BN/BN,TIS/BN: HCPCS | Performed by: STUDENT IN AN ORGANIZED HEALTH CARE EDUCATION/TRAINING PROGRAM

## 2025-06-05 PROCEDURE — 36000710: Performed by: STUDENT IN AN ORGANIZED HEALTH CARE EDUCATION/TRAINING PROGRAM

## 2025-06-05 PROCEDURE — 71000044 HC DOSC ROUTINE RECOVERY FIRST HOUR: Performed by: STUDENT IN AN ORGANIZED HEALTH CARE EDUCATION/TRAINING PROGRAM

## 2025-06-05 PROCEDURE — 71000016 HC POSTOP RECOV ADDL HR: Performed by: STUDENT IN AN ORGANIZED HEALTH CARE EDUCATION/TRAINING PROGRAM

## 2025-06-05 PROCEDURE — 93010 ELECTROCARDIOGRAM REPORT: CPT | Mod: ,,, | Performed by: STUDENT IN AN ORGANIZED HEALTH CARE EDUCATION/TRAINING PROGRAM

## 2025-06-05 PROCEDURE — 37000008 HC ANESTHESIA 1ST 15 MINUTES: Performed by: STUDENT IN AN ORGANIZED HEALTH CARE EDUCATION/TRAINING PROGRAM

## 2025-06-05 DEVICE — IMPLANTABLE DEVICE: Type: IMPLANTABLE DEVICE | Site: KNEE | Status: FUNCTIONAL

## 2025-06-05 DEVICE — SWVLK PEEK TENO 7MM W/FORK EYELET
Type: IMPLANTABLE DEVICE | Site: LEG | Status: FUNCTIONAL
Brand: ARTHREX®

## 2025-06-05 DEVICE — PROTECTOR AXOGUARD NERVE 10X40: Type: IMPLANTABLE DEVICE | Site: LEG | Status: FUNCTIONAL

## 2025-06-05 DEVICE — TENDON POSTERIOR TIBIALIS: Type: IMPLANTABLE DEVICE | Site: KNEE | Status: FUNCTIONAL

## 2025-06-05 DEVICE — IMPLANTABLE DEVICE: Type: IMPLANTABLE DEVICE | Site: LEG | Status: FUNCTIONAL

## 2025-06-05 RX ORDER — METHOCARBAMOL 750 MG/1
750 TABLET, FILM COATED ORAL 4 TIMES DAILY PRN
Status: DISCONTINUED | OUTPATIENT
Start: 2025-06-05 | End: 2025-06-05

## 2025-06-05 RX ORDER — OXYCODONE HYDROCHLORIDE 5 MG/1
5 TABLET ORAL EVERY 6 HOURS PRN
Qty: 21 TABLET | Refills: 0 | Status: SHIPPED | OUTPATIENT
Start: 2025-06-05

## 2025-06-05 RX ORDER — FENTANYL CITRATE 50 UG/ML
INJECTION, SOLUTION INTRAMUSCULAR; INTRAVENOUS
Status: DISCONTINUED | OUTPATIENT
Start: 2025-06-05 | End: 2025-06-05

## 2025-06-05 RX ORDER — OXYCODONE HYDROCHLORIDE 5 MG/1
5 TABLET ORAL EVERY 6 HOURS PRN
Refills: 0 | Status: DISCONTINUED | OUTPATIENT
Start: 2025-06-05 | End: 2025-06-05

## 2025-06-05 RX ORDER — OXYCODONE HYDROCHLORIDE 5 MG/1
5 TABLET ORAL
Status: DISCONTINUED | OUTPATIENT
Start: 2025-06-05 | End: 2025-06-05

## 2025-06-05 RX ORDER — PROPOFOL 10 MG/ML
VIAL (ML) INTRAVENOUS
Status: DISCONTINUED | OUTPATIENT
Start: 2025-06-05 | End: 2025-06-05

## 2025-06-05 RX ORDER — TRANEXAMIC ACID 1 G/10ML
INJECTION, SOLUTION INTRAVENOUS
Status: DISCONTINUED | OUTPATIENT
Start: 2025-06-05 | End: 2025-06-05

## 2025-06-05 RX ORDER — PROMETHAZINE HYDROCHLORIDE 25 MG/1
25 TABLET ORAL EVERY 6 HOURS PRN
Qty: 30 TABLET | Refills: 0 | Status: SHIPPED | OUTPATIENT
Start: 2025-06-05

## 2025-06-05 RX ORDER — ONDANSETRON HYDROCHLORIDE 2 MG/ML
INJECTION, SOLUTION INTRAVENOUS
Status: DISCONTINUED | OUTPATIENT
Start: 2025-06-05 | End: 2025-06-05

## 2025-06-05 RX ORDER — ASPIRIN 81 MG/1
81 TABLET ORAL DAILY
Qty: 42 TABLET | Refills: 0 | Status: SHIPPED | OUTPATIENT
Start: 2025-06-05 | End: 2025-07-17

## 2025-06-05 RX ORDER — ROCURONIUM BROMIDE 10 MG/ML
INJECTION, SOLUTION INTRAVENOUS
Status: DISCONTINUED | OUTPATIENT
Start: 2025-06-05 | End: 2025-06-05

## 2025-06-05 RX ORDER — FENTANYL CITRATE 50 UG/ML
25-200 INJECTION, SOLUTION INTRAMUSCULAR; INTRAVENOUS
Status: DISCONTINUED | OUTPATIENT
Start: 2025-06-05 | End: 2025-06-05 | Stop reason: HOSPADM

## 2025-06-05 RX ORDER — HYDROMORPHONE HYDROCHLORIDE 1 MG/ML
0.2 INJECTION, SOLUTION INTRAMUSCULAR; INTRAVENOUS; SUBCUTANEOUS EVERY 5 MIN PRN
Status: DISCONTINUED | OUTPATIENT
Start: 2025-06-05 | End: 2025-06-05 | Stop reason: HOSPADM

## 2025-06-05 RX ORDER — ONDANSETRON HYDROCHLORIDE 2 MG/ML
4 INJECTION, SOLUTION INTRAVENOUS EVERY 12 HOURS PRN
Status: DISCONTINUED | OUTPATIENT
Start: 2025-06-05 | End: 2025-06-05

## 2025-06-05 RX ORDER — LIDOCAINE HYDROCHLORIDE 20 MG/ML
INJECTION, SOLUTION EPIDURAL; INFILTRATION; INTRACAUDAL; PERINEURAL
Status: DISCONTINUED | OUTPATIENT
Start: 2025-06-05 | End: 2025-06-05

## 2025-06-05 RX ORDER — OXYCODONE HYDROCHLORIDE 5 MG/1
5 TABLET ORAL EVERY 4 HOURS PRN
Refills: 0 | Status: DISCONTINUED | OUTPATIENT
Start: 2025-06-05 | End: 2025-06-05 | Stop reason: HOSPADM

## 2025-06-05 RX ORDER — OXYCODONE HYDROCHLORIDE 10 MG/1
10 TABLET ORAL EVERY 4 HOURS PRN
Refills: 0 | Status: DISCONTINUED | OUTPATIENT
Start: 2025-06-05 | End: 2025-06-05 | Stop reason: HOSPADM

## 2025-06-05 RX ORDER — DEXAMETHASONE SODIUM PHOSPHATE 4 MG/ML
INJECTION, SOLUTION INTRA-ARTICULAR; INTRALESIONAL; INTRAMUSCULAR; INTRAVENOUS; SOFT TISSUE
Status: DISCONTINUED | OUTPATIENT
Start: 2025-06-05 | End: 2025-06-05

## 2025-06-05 RX ORDER — KETAMINE HCL IN 0.9 % NACL 50 MG/5 ML
SYRINGE (ML) INTRAVENOUS
Status: DISCONTINUED | OUTPATIENT
Start: 2025-06-05 | End: 2025-06-05

## 2025-06-05 RX ORDER — OXYCODONE HYDROCHLORIDE 5 MG/1
5 TABLET ORAL EVERY 4 HOURS PRN
Refills: 0 | Status: DISCONTINUED | OUTPATIENT
Start: 2025-06-05 | End: 2025-06-05

## 2025-06-05 RX ORDER — SUCCINYLCHOLINE CHLORIDE 20 MG/ML
INJECTION INTRAMUSCULAR; INTRAVENOUS
Status: DISCONTINUED | OUTPATIENT
Start: 2025-06-05 | End: 2025-06-05

## 2025-06-05 RX ORDER — SODIUM CHLORIDE 9 MG/ML
INJECTION, SOLUTION INTRAVENOUS CONTINUOUS
Status: DISCONTINUED | OUTPATIENT
Start: 2025-06-05 | End: 2025-06-05 | Stop reason: HOSPADM

## 2025-06-05 RX ORDER — DEXMEDETOMIDINE HYDROCHLORIDE 100 UG/ML
INJECTION, SOLUTION INTRAVENOUS
Status: DISCONTINUED | OUTPATIENT
Start: 2025-06-05 | End: 2025-06-05

## 2025-06-05 RX ORDER — EPINEPHRINE 1 MG/ML
INJECTION INTRAMUSCULAR; INTRAVENOUS; SUBCUTANEOUS
Status: DISCONTINUED | OUTPATIENT
Start: 2025-06-05 | End: 2025-06-05 | Stop reason: HOSPADM

## 2025-06-05 RX ORDER — CEFAZOLIN SODIUM 1 G/3ML
INJECTION, POWDER, FOR SOLUTION INTRAMUSCULAR; INTRAVENOUS
Status: DISCONTINUED | OUTPATIENT
Start: 2025-06-05 | End: 2025-06-05

## 2025-06-05 RX ORDER — HALOPERIDOL LACTATE 5 MG/ML
0.5 INJECTION, SOLUTION INTRAMUSCULAR EVERY 10 MIN PRN
Status: DISCONTINUED | OUTPATIENT
Start: 2025-06-05 | End: 2025-06-05 | Stop reason: HOSPADM

## 2025-06-05 RX ORDER — MIDAZOLAM HYDROCHLORIDE 1 MG/ML
.5-4 INJECTION, SOLUTION INTRAMUSCULAR; INTRAVENOUS
Status: DISCONTINUED | OUTPATIENT
Start: 2025-06-05 | End: 2025-06-05 | Stop reason: HOSPADM

## 2025-06-05 RX ORDER — ONDANSETRON HYDROCHLORIDE 2 MG/ML
4 INJECTION, SOLUTION INTRAVENOUS EVERY 12 HOURS PRN
Status: DISCONTINUED | OUTPATIENT
Start: 2025-06-05 | End: 2025-06-05 | Stop reason: HOSPADM

## 2025-06-05 RX ORDER — CELECOXIB 200 MG/1
200 CAPSULE ORAL 2 TIMES DAILY WITH MEALS
Qty: 60 CAPSULE | Refills: 0 | Status: SHIPPED | OUTPATIENT
Start: 2025-06-05

## 2025-06-05 RX ORDER — GLUCAGON 1 MG
1 KIT INJECTION
Status: DISCONTINUED | OUTPATIENT
Start: 2025-06-05 | End: 2025-06-05 | Stop reason: HOSPADM

## 2025-06-05 RX ORDER — ROPIVACAINE HYDROCHLORIDE 2 MG/ML
INJECTION, SOLUTION EPIDURAL; INFILTRATION; PERINEURAL CONTINUOUS
Status: DISCONTINUED | OUTPATIENT
Start: 2025-06-05 | End: 2025-06-05 | Stop reason: HOSPADM

## 2025-06-05 RX ORDER — VANCOMYCIN HYDROCHLORIDE 1 G/20ML
INJECTION, POWDER, LYOPHILIZED, FOR SOLUTION INTRAVENOUS
Status: DISCONTINUED | OUTPATIENT
Start: 2025-06-05 | End: 2025-06-05 | Stop reason: HOSPADM

## 2025-06-05 RX ORDER — METHOCARBAMOL 500 MG/1
500 TABLET, FILM COATED ORAL 3 TIMES DAILY PRN
Qty: 30 TABLET | Refills: 0 | Status: SHIPPED | OUTPATIENT
Start: 2025-06-05

## 2025-06-05 RX ORDER — ROPIVACAINE HYDROCHLORIDE 2 MG/ML
0.5 INJECTION, SOLUTION EPIDURAL; INFILTRATION CONTINUOUS
Status: DISCONTINUED | OUTPATIENT
Start: 2025-06-05 | End: 2025-06-05

## 2025-06-05 RX ORDER — METOCLOPRAMIDE HYDROCHLORIDE 5 MG/ML
5 INJECTION INTRAMUSCULAR; INTRAVENOUS EVERY 6 HOURS PRN
Status: DISCONTINUED | OUTPATIENT
Start: 2025-06-05 | End: 2025-06-05 | Stop reason: HOSPADM

## 2025-06-05 RX ORDER — MUPIROCIN 20 MG/G
OINTMENT TOPICAL
Status: DISCONTINUED | OUTPATIENT
Start: 2025-06-05 | End: 2025-06-05 | Stop reason: HOSPADM

## 2025-06-05 RX ORDER — HYDROCODONE BITARTRATE AND ACETAMINOPHEN 5; 325 MG/1; MG/1
1 TABLET ORAL EVERY 4 HOURS PRN
Refills: 0 | Status: DISCONTINUED | OUTPATIENT
Start: 2025-06-05 | End: 2025-06-05

## 2025-06-05 RX ORDER — ROPIVACAINE HYDROCHLORIDE 5 MG/ML
INJECTION, SOLUTION EPIDURAL; INFILTRATION; PERINEURAL
Status: COMPLETED | OUTPATIENT
Start: 2025-06-05 | End: 2025-06-05

## 2025-06-05 RX ORDER — ESMOLOL HYDROCHLORIDE 10 MG/ML
INJECTION INTRAVENOUS
Status: DISCONTINUED | OUTPATIENT
Start: 2025-06-05 | End: 2025-06-05

## 2025-06-05 RX ORDER — METHOCARBAMOL 750 MG/1
750 TABLET, FILM COATED ORAL EVERY 6 HOURS
Status: DISCONTINUED | OUTPATIENT
Start: 2025-06-05 | End: 2025-06-05 | Stop reason: HOSPADM

## 2025-06-05 RX ORDER — ACETAMINOPHEN 500 MG
1000 TABLET ORAL ONCE
Status: COMPLETED | OUTPATIENT
Start: 2025-06-05 | End: 2025-06-05

## 2025-06-05 RX ORDER — ACETAMINOPHEN 500 MG
1000 TABLET ORAL EVERY 6 HOURS SCHEDULED
Status: DISCONTINUED | OUTPATIENT
Start: 2025-06-05 | End: 2025-06-05 | Stop reason: HOSPADM

## 2025-06-05 RX ORDER — SODIUM CHLORIDE 0.9 % (FLUSH) 0.9 %
10 SYRINGE (ML) INJECTION
Status: DISCONTINUED | OUTPATIENT
Start: 2025-06-05 | End: 2025-06-05 | Stop reason: HOSPADM

## 2025-06-05 RX ORDER — ACETAMINOPHEN 325 MG/1
650 TABLET ORAL EVERY 4 HOURS PRN
Status: DISCONTINUED | OUTPATIENT
Start: 2025-06-05 | End: 2025-06-05

## 2025-06-05 RX ADMIN — ACETAMINOPHEN 1000 MG: 500 TABLET ORAL at 05:06

## 2025-06-05 RX ADMIN — Medication: at 11:06

## 2025-06-05 RX ADMIN — HYDROMORPHONE HYDROCHLORIDE 0.2 MG: 1 INJECTION, SOLUTION INTRAMUSCULAR; INTRAVENOUS; SUBCUTANEOUS at 11:06

## 2025-06-05 RX ADMIN — FENTANYL CITRATE 50 MCG: 50 INJECTION INTRAMUSCULAR; INTRAVENOUS at 06:06

## 2025-06-05 RX ADMIN — SODIUM CHLORIDE: 9 INJECTION, SOLUTION INTRAVENOUS at 07:06

## 2025-06-05 RX ADMIN — FENTANYL CITRATE 100 MCG: 50 INJECTION INTRAMUSCULAR; INTRAVENOUS at 07:06

## 2025-06-05 RX ADMIN — TRANEXAMIC ACID 1000 MG: 100 INJECTION INTRAVENOUS at 07:06

## 2025-06-05 RX ADMIN — ESMOLOL HYDROCHLORIDE 10 MG: 100 INJECTION, SOLUTION INTRAVENOUS at 08:06

## 2025-06-05 RX ADMIN — FENTANYL CITRATE 25 MCG: 50 INJECTION INTRAMUSCULAR; INTRAVENOUS at 10:06

## 2025-06-05 RX ADMIN — ESMOLOL HYDROCHLORIDE 10 MG: 100 INJECTION, SOLUTION INTRAVENOUS at 07:06

## 2025-06-05 RX ADMIN — DEXAMETHASONE SODIUM PHOSPHATE 4 MG: 4 INJECTION INTRA-ARTICULAR; INTRALESIONAL; INTRAMUSCULAR; INTRAVENOUS; SOFT TISSUE at 07:06

## 2025-06-05 RX ADMIN — CEFAZOLIN 3 G: 330 INJECTION, POWDER, FOR SOLUTION INTRAMUSCULAR; INTRAVENOUS at 07:06

## 2025-06-05 RX ADMIN — SUCCINYLCHOLINE CHLORIDE 140 MG: 20 INJECTION, SOLUTION INTRAMUSCULAR; INTRAVENOUS at 07:06

## 2025-06-05 RX ADMIN — PROPOFOL 20 MG: 10 INJECTION, EMULSION INTRAVENOUS at 09:06

## 2025-06-05 RX ADMIN — Medication 30 MG: at 07:06

## 2025-06-05 RX ADMIN — ROCURONIUM BROMIDE 10 MG: 10 INJECTION INTRAVENOUS at 09:06

## 2025-06-05 RX ADMIN — DEXMEDETOMIDINE 4 MCG: 200 INJECTION, SOLUTION INTRAVENOUS at 09:06

## 2025-06-05 RX ADMIN — ROPIVACAINE HYDROCHLORIDE 10 ML: 5 INJECTION EPIDURAL; INFILTRATION; PERINEURAL at 07:06

## 2025-06-05 RX ADMIN — PROPOFOL 50 MG: 10 INJECTION, EMULSION INTRAVENOUS at 07:06

## 2025-06-05 RX ADMIN — DEXMEDETOMIDINE 12 MCG: 200 INJECTION, SOLUTION INTRAVENOUS at 07:06

## 2025-06-05 RX ADMIN — HYDROMORPHONE HYDROCHLORIDE 0.2 MG: 1 INJECTION, SOLUTION INTRAMUSCULAR; INTRAVENOUS; SUBCUTANEOUS at 10:06

## 2025-06-05 RX ADMIN — Medication 20 MG: at 07:06

## 2025-06-05 RX ADMIN — TRANEXAMIC ACID 1000 MG: 100 INJECTION INTRAVENOUS at 09:06

## 2025-06-05 RX ADMIN — PROPOFOL 100 MG: 10 INJECTION, EMULSION INTRAVENOUS at 07:06

## 2025-06-05 RX ADMIN — ONDANSETRON 4 MG: 2 INJECTION INTRAMUSCULAR; INTRAVENOUS at 09:06

## 2025-06-05 RX ADMIN — PROPOFOL 40 MG: 10 INJECTION, EMULSION INTRAVENOUS at 10:06

## 2025-06-05 RX ADMIN — METHOCARBAMOL 750 MG: 750 TABLET ORAL at 11:06

## 2025-06-05 RX ADMIN — SODIUM CHLORIDE: 9 INJECTION, SOLUTION INTRAVENOUS at 05:06

## 2025-06-05 RX ADMIN — SUGAMMADEX 400 MG: 100 INJECTION, SOLUTION INTRAVENOUS at 10:06

## 2025-06-05 RX ADMIN — ROCURONIUM BROMIDE 50 MG: 10 INJECTION INTRAVENOUS at 07:06

## 2025-06-05 RX ADMIN — MUPIROCIN: 20 OINTMENT TOPICAL at 07:06

## 2025-06-05 RX ADMIN — OXYCODONE 5 MG: 5 TABLET ORAL at 10:06

## 2025-06-05 RX ADMIN — DEXMEDETOMIDINE 4 MCG: 200 INJECTION, SOLUTION INTRAVENOUS at 07:06

## 2025-06-05 RX ADMIN — FENTANYL CITRATE 50 MCG: 50 INJECTION INTRAMUSCULAR; INTRAVENOUS at 08:06

## 2025-06-05 RX ADMIN — DEXMEDETOMIDINE 4 MCG: 200 INJECTION, SOLUTION INTRAVENOUS at 10:06

## 2025-06-05 RX ADMIN — ROCURONIUM BROMIDE 20 MG: 10 INJECTION INTRAVENOUS at 08:06

## 2025-06-05 RX ADMIN — FENTANYL CITRATE 25 MCG: 50 INJECTION INTRAMUSCULAR; INTRAVENOUS at 09:06

## 2025-06-05 RX ADMIN — LIDOCAINE HYDROCHLORIDE 80 MG: 20 INJECTION, SOLUTION EPIDURAL; INFILTRATION; INTRACAUDAL at 07:06

## 2025-06-05 RX ADMIN — MIDAZOLAM 2 MG: 1 INJECTION INTRAMUSCULAR; INTRAVENOUS at 06:06

## 2025-06-05 RX ADMIN — ROCURONIUM BROMIDE 30 MG: 10 INJECTION INTRAVENOUS at 08:06

## 2025-06-05 NOTE — PLAN OF CARE
Preop safety checklist complete. Pt's questions answered. Bed low and locked, siderails upx2, call light is within reach. Pt's belongs sent with mom and sister.

## 2025-06-05 NOTE — OP NOTE
DATE OF PROCEDURE: 06/05/2025    SURGEON: Jhony Berry MD    ASSISTANT:  Prabhu PALENCIA   It was medically necessary for Prabhu Glover MD  to perform first assistant duties aiding in exposure, setup and closure.      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     IMPLANTS:  Implant Name Type Inv. Item Serial No.  Lot No. LRB No. Used Action   IMPL SFT TIS 250MM 5MM STRL LF - [id number]  IMPL SFT TIS 250MM 5MM STRL LF  [employer] [id number] Left 1 Implanted   TENDON POSTERIOR TIBIALIS - [id number]  TENDON POSTERIOR TIBIALIS [id number] MUSCULOSKELETAL TRANSPLANT FND  Left 1 Implanted   ANCHOR FORKED TIP 7MM PEEI - [id number]  ANCHOR FORKED TIP 7MM PEEI  ARTHREX [id number] Left 2 Implanted   ANCHOR SUT FIBERTAK 2 LOD - [id number]  ANCHOR SUT FIBERTAK 2 LOD  ARTHREX [id number] Left 2 Implanted   PROTECTOR AXOGUARD NERVE 10X40 - [id number]  PROTECTOR AXOGUARD NERVE 10X40  AXOGEN [id number] Left 1 Implanted   ANCHOR FORKED TIP 7MM PEEI - [id number]  ANCHOR FORKED TIP 7MM PEEI  ARTHREX [id number] Left 1 Implanted         DRAINS: None.      COMPLICATIONS: None.     INTRAOPERATIVE FINDINGS:  Exam under anesthesia with range of motion of 10/0/135, equal to contralateral knee.  Negative Lachman's negative posterior drawer and stable do valgus stressing at 0 and 30°.  Grade 2 laxity valgus stressing at 30°, stable at 0°.  Diagnostic arthroscopy revealed grade 2 chondromalacia of the medial femoral condyle.  Loose meniscus implant with a posterior horn medial meniscus with repaired and healed repair.  Free edge tearing of medial meniscus, root was intact.  Lateral meniscus had healed repair and intact root.  ACL was intact and stable including on diagnostic arthroscopic Lachman's.  PCL was intact.  Unstable posterolateral corner reconstruction.  Contused and extensive edema around common peroneal nerve.    BRIEF INDICATION OF MEDICAL NECESSITY:   Felicity Cohen is a 25 y.o. female previously underwent left ACL reconstruction and posterolateral corner reconstruction in December of 2025.  she had advanced imaging, including X-rays and MRI, which demonstrated tear of the posterolateral corner reconstruction after a slip and fall.  After discussion with the patient was determined the best course of action would be to proceed to the operating room for knee arthroscopy with revision posterolateral corner reconstruction with allograft.  Procedures, as well as the risks, benefits, and alternatives, were explained to the patient in great detail all questions were answered.  Informed consent was obtained.      PROCEDURE IN DETAIL:   The patient was identified in the preoperative holding area and the site was marked.   a block was administered by the anesthesia team and she was taken to the operating room where there placed in the supine position on the operating room table.  Anesthetic agents were then administered.  Exam under anesthesia performed, see the detailed findings above.  A nonsterile tourniquet was then applied to the upper thigh.  The left leg was  then prepped and draped in standard sterile fashion.  A time-out was undertaken around the patient, site, surgery, surgeon, and administration preoperative antibiotics.  All was agreed upon we proceeded.    A standard lateral arthroscopic portal was established and the joint was insufflated with saline solution.  The scope was inserted into the notch and in the suprapatellar pouch and a diagnostic arthroscopy was then performed.      The suprapatellar pouch did not have a plica.      There were not noted to be loose bodies.     Stiff synovitis with the knee arthroscopic synovectomy was performed using radiofrequency ablator arthroscopic shaver.      There is to be a cyclops lesion and this was debrided back using arthroscopic shaver.  Additionally, there were adhesions of the anterior interval as well as mediolateral gutter and suprapatellar pouch.  An arthroscopic lysis of adhesions was performed using a radiofrequency ablator arthroscopic shaver.    The camera was then moved into the notch in the ACL graft was examined probed.  It was noted to be intact.  Arthroscopic Lachman's performed which had a taut and stable graft.  A small amount of the prepatellar fat pad was removed using a combination of the shaver and electrocautery.    The leg was then placed in a figure 4 position and the lateral compartment was examined.  The lateral meniscus was thoroughly examined and noted to be evidence of prior repair which was heal.  The root of the lateral meniscus was probed and noted to be intact and stable.   The lateral tibial plateau and lateral femoral condyle were examined for cartilage deficits.  There were noted to be in no chondral damage.    A valgus stress was then applied to the knee and the camera was placed into the medial compartment and this compartment was examined.  The medial meniscus was thoroughly examined and noted to be loose suture from prior repair which was healed.  Additionally, there is free edge  tearing.  The root of the medial meniscus was probed and noted to be intact and stable.   A biter was used to remove the loose implant.  Arthroscopic biter and J Luis were then used to perform partial medial meniscectomy of the free edge.  The medial tibial plateau and medial femoral condyle were examined for cartilage deficits.  There were noted to be grade 2 chondromalacia of the medial femoral condyle a chondroplasty was performed using arthroscopic shaver.    The tracking of the patella was examined and noted to track within the trochlear groove.  The undersurface of the patella in the trochlear groove were examined for cartilage defects.  There were noted to be no chondral damage.    At this point we are ready to begin with the revision posterolateral corner reconstruction.  An extensive lateral exposure was used, utilizing the prior incision.  The prior scar was sharply excised.  Generous fasciocutaneous skin flaps were developed for advanced closure at the end of the case.  We then identified the common peroneal nerve.  Extensive time was taken to dissect the adhesions and scarring off this.  The nerve was noted to be very contused and edematous and Dr. Rousseau was consulted intraoperatively, see his operative report for full nerve procedure.      We then peeled back a portion of the biceps femoris as well as the anterior and lateral compartment to identify the prior posterolateral corner tunneled through the fibula.  Once this was identified, a guide pin was placed here and a 7 mm Reamer was used to prepare of the hole.  A shuttling suture was then placed.  The graft was prepared on the back table in standard fashion, this was a posterior tibialis tendon allograft with the incorporation of the bio brace resulting in a 7 mm diameter.      The graft was then shuttled through the fibular tunnel.  Our windows were then created and we exposed the femoral attachment of the popliteus and the LCL.  At the popliteal  insertion a guide pin was placed and a 7 mm Reamer was used to drill to a depth of 20 mm.  The popliteal limb of the graft was then shuttled from the posterior aspect of the fibula up to the popliteal attachment underneath the IT band.  We loaded 1 suture from the whipstitch end of the popliteal limb into the anchor and a 7 x 20 mm peek interference screw was placed with excellent purchase.  The knee was then placed in 90° of flexion and neutral rotation.  There were graft was held taut and a 7 x 20 mm peek interference screw was placed within the fibular tunnel with excellent purchase.  This results in a in an excellent reconstruction of the popliteal limb.  We then shuttled the LCL limb from the anterior aspect of the fibula underneath the IT band up to the LCL insertion.  A guide pin was placed here and we reamed a depth of 35 mm using a 7 mm Reamer.  The sutures from the LCL limb were then pulled thirds of the medial aspect of the thigh.  With the knee in 30° of flexion and applying a valgus force while pulling on the graft, a 7 x 20 mm peek interference screw was placed with excellent purchase.  The knee was taken through full range of motion noted to be stable.  There was noted to be a firm endpoint with varus testing.    At this point the procedure was complete and all instruments were removed.  The incision was copiously irrigated with saline solution.  It was closed in a layered fashion using #1 Vicryl in interrupted fashion for the deep fascial layers.  1 g of vancomycin powder was then placed with the incision and the fascial fat layer was closed using 0 Vicryl suture.  3-0 Vicryl, Monocryl, and Dermabond were used for all the skin incisions.  There were covered with sterile dressings and she is placed in a T scope brace.  The patient was awakened from the anesthetic agents.  The procedure was complete without complication and tolerated well by the patient.  Was then taken to the postanesthesia care unit  in stable condition    POSTOPERATIVE PLAN:  She will follow the posterolateral corner reconstruction protocol.  Return to clinic in 2 weeks.

## 2025-06-05 NOTE — OPERATIVE NOTE ADDENDUM
Certification of Assistant at Surgery       Surgery Date: 6/5/2025     Participating Surgeons:  Surgeons and Role:  Panel 1:     * Jhony Berry MD - Primary     * Prabhu Glover MD - Fellow  Panel 2:     * Aureliano Rousseau MD - Primary    Procedures:  Procedure(s) (LRB):  REVISION RECONSTRUCTION, KNEE, POSTEROLATERAL CORNER (Left)  ARTHROSCOPY, KNEE, W/ LYSIS OF ADHESIONS (Left)  RECONSTRUCTION, KNEE, POSTEROLATERAL CORNER (Left)  DECOMPRESSION, PERONEAL NERVE (Left)  ARTHROSCOPY, KNEE, W/ PLICA EXCISION (Left)  ARTHROSCOPY, KNEE, W/ MENISCECTOMY (Left)  ARTHROSCOPY, KNEE, W/ CHONDROPLASTY (Left)    Assistant Surgeon's Certification of Necessity:  I understand that section 1842 (b) (6) (d) of the Social Security Act generally prohibits Medicare Part B reasonable charge payment for the services of assistants at surgery in teaching hospitals when qualified residents are available to furnish such services. I certify that the services for which payment is claimed were medically necessary, and that no qualified resident was available to perform the services. I further understand that these services are subject to post-payment review by the Medicare carrier.      Prabhu Glover MD    06/05/2025  11:44 AM

## 2025-06-05 NOTE — BRIEF OP NOTE
[facility] - Surgery ([facility])  Brief Operative Note    Surgery Date: 6/5/2025     Surgeons and Role:  Panel 1:     * Jhony Berry MD - Primary     * Prabhu Glover MD - Fellow  Panel 2:     * Aureliano Rousseau MD - Primary    Assisting Surgeon: None    Pre-op Diagnosis:  Injury of posterolateral corner of left knee, initial encounter [S89.92XA]  Injury of left peroneal nerve, initial encounter [S84.12XA]    Post-op Diagnosis:  Post-Op Diagnosis Codes:     * Injury of posterolateral corner of left knee, initial encounter [S89.92XA]     * Injury of left peroneal nerve, initial encounter [S84.12XA]    Procedure(s) (LRB):  REVISION RECONSTRUCTION, KNEE, POSTEROLATERAL CORNER (Left)  ARTHROSCOPY, KNEE, W/ LYSIS OF ADHESIONS (Left)  RECONSTRUCTION, KNEE, POSTEROLATERAL CORNER (Left)  DECOMPRESSION, PERONEAL NERVE (Left)  ARTHROSCOPY, KNEE, W/ PLICA EXCISION (Left)  ARTHROSCOPY, KNEE, W/ MENISCECTOMY (Left)  ARTHROSCOPY, KNEE, W/ CHONDROPLASTY (Left)    Anesthesia: General/Regional    Operative Findings: PCL reconstruction failure    Estimated Blood Loss: * No values recorded between 6/5/2025  7:22 AM and 6/5/2025 10:35 AM *         Specimens:   Specimen (24h ago, onward)      None            * No specimens in log *        Discharge Note    OUTCOME: Patient tolerated treatment/procedure well without complication and is now ready for discharge.    DISPOSITION: Home or Self Care    FINAL DIAGNOSIS:  <principal problem not specified>    FOLLOWUP: In clinic    DISCHARGE INSTRUCTIONS:  No discharge procedures on file.

## 2025-06-06 NOTE — CARE UPDATE
Called patient at home about her PNC pump. No issues with the pump or tubing. Pain well controlled. No symptoms.     Celso Hopkins MD, PGY3  Department of Anesthesiology  [facility]

## 2025-06-06 NOTE — NURSING
Pt and mom very familiar with CADD pump as she had one for her surgery in December 2024.  Nonetheless, pt and mom provided with the pamphlet.  Provided removal date of 6/10. Extra Tegaderms provided.  Instructed to not remove initial dressing but to only reinforce with Tegaderms.  Explained removing initial dsg will remove the cath so not to remove until time to discontinue.  Voiced understanding.  Questions answered. Pump infusing without diff.

## 2025-06-06 NOTE — OP NOTE
Plastic, Reconstructive, and Hand Surgery  Operative Note     Patient:  Felicity Cohen   MRN:  [id number]   YOB: 2000     Date of Surgery: 6/5/2025     Preoperative Diagnosis:   Compression left common peroneal nerve  Posterolateral corner reconstruction failure       Postoperative Diagnosis:  Same     Procedure Performed:  A decompression left common peroneal nerve at the knee with placement of nerve protector     Attending Surgeon: Aureliano Rousseau MD      Surgical Team: Surgeons and Role:  Panel 1:     * Jhony Berry MD - Primary     * Prabhu Glover MD - Fellow  Panel 2:     * Aureliano Rousseau MD - Primary     Anesthesia: General/Regional      Key Findings:  Common perineal nerve found to be significantly contused with the extensive edema.  This was thoroughly decompressed and nerve protector placed     Estimated Blood Loss:  5 mL     Drains:  None     Implants:   Implant Name Type Inv. Item Serial No.  Lot No. LRB No. Used Action   IMPL SFT TIS 250MM 5MM STRL LF - [id number]  IMPL SFT TIS 250MM 5MM STRL LF  [id number] Left 1 Implanted   TENDON POSTERIOR TIBIALIS - [id number]  TENDON POSTERIOR TIBIALIS [id number] MUSCULOSKELETAL TRANSPLANT FND  Left 1 Implanted   ANCHOR FORKED TIP 7MM PEEI - [id number]  ANCHOR FORKED TIP 7MM PEEI  ARTHREX [id number] Left 2 Implanted   ANCHOR SUT FIBERTAK 2 LOD - [id number]  ANCHOR SUT FIBERTAK 2 LOD  ARTHREX [id number] Left 2 Implanted   PROTECTOR AXOGUARD NERVE 10X40 - [id number]  PROTECTOR AXOGUARD NERVE 10X40  AXOGEN [id number] Left 1 Implanted   ANCHOR FORKED TIP 7MM PEEI - [id number]  ANCHOR FORKED TIP 7MM PEEI  ARTHREX [id number] Left 1 Implanted         Complications:  none     Specimens:  none     Indications for Procedure:  Felicity Cohen is a 25 y.o. female who had previous left ACL reconstruction and posterolateral corner reconstruction in December of 2025.  She sustained a fall which demonstrated a  tear of the posterolateral corner reconstruction.  Intraoperatively, her common peroneal nerve was found to be significantly contused with extensive edema.  I was consulted intraoperatively to further assess the common peroneal nerve.. I recommended decompression with placement of nerve protector.     Procedure in Detail:  Upon entering the room the patient was under general anesthesia with block under the care of the Orthopedic surgery Service.  For more details, please refer to their operative note.     Attention was turned to the left leg.  There was generous exposure noted to the lateral leg.  The common perineal nerve was noted within the wound bed and found to have extensive edema and contusion.  This appeared at the level of arborization around the fibular neck.  Proximal dissection was carried out along the nerve, ensuring complete release of the nerve circumferentially from the surrounding soft tissue.  Distally this was carried out into the anterior and lateral compartment of the leg to fully decompress all the branches.  The posterior and anterior intramuscular septum were incised sharply with tenotomy scissors.  The deep intermuscular septum was also incised sharply with tenotomy scissors, thereby ensuring complete release of all compression points along the course of the common peroneal nerve.  That was proximal dilatation and injection along the course of common peroneal nerve proximal to these areas, confirming compression of the nerve.  Given the extensive revision nature of the case as well as a significant trauma noted to the nerve, decision was made to place nerve protector.  A 10 x 40 mm piece of AxoGen nerve protector was then circumferentially wrapped around the nerve.  This was secured using small clips and ensured complete coverage of the nerve.  There was no significant areas of compression of the nerve from the nerve wrap.  At this point the Orthopedic surgery Service resumed care of the  patient and completed their portion of the case.    Post-operative Plan:  Per Orthopedic surgery    Follow up:  2 weeks      Attestation of Presence:  I was present for the entirety of the procedure.     Aureliano Rousseau MD  06/06/2025 4:17 PM

## 2025-06-07 NOTE — CARE UPDATE
Pt called in with pain to her catheter site when infusing local anesthetic. Ok for patient to pull catheter. Pain tolerable at a 6/10 on oral medications.     Celso Hopkins MD, PGY3  Department of Anesthesiology  [facility]

## 2025-06-11 ENCOUNTER — CLINICAL SUPPORT (OUTPATIENT)
Dept: REHABILITATION | Facility: HOSPITAL | Age: 25
End: 2025-06-11
Payer: MEDICAID

## 2025-06-11 DIAGNOSIS — M25.562 ACUTE PAIN OF LEFT KNEE: Primary | ICD-10-CM

## 2025-06-11 PROCEDURE — 97110 THERAPEUTIC EXERCISES: CPT

## 2025-06-11 PROCEDURE — 97164 PT RE-EVAL EST PLAN CARE: CPT

## 2025-06-11 NOTE — PROGRESS NOTES
Outpatient Rehab    Physical Therapy Progress Note : Updated Plan of Care    Patient Name: Felicity Cohen  MRN: [id number]  YOB: 2000  Encounter Date: 6/11/2025    Therapy Diagnosis:   Encounter Diagnosis   Name Primary?    Acute pain of left knee Yes     Physician: Jhony Berry MD    Physician Orders: Eval and Treat  Medical Diagnosis: Rupture of anterior cruciate ligament of left knee, initial encounter  Tear of lateral collateral ligament of left knee, initial encounter  Acute medial meniscal injury of left knee, subsequent encounter  Surgical Diagnosis: Not applicable for this Episode   Surgical Date: Not applicable for this Episode    Visit # / Visits Authorized:  32 / 44  Insurance Authorization Period: 1/1/2025 to 6/13/2025  Date of Evaluation: 10/24/2024   Plan of Care Certification:  6/11/25-11/26/25     Time In: 1500   Time Out: 1545  Total Time (in minutes): 45   Total Billable Time (in minutes):      FOTO:  Intake Score:  %  Survey Score 2:  %  Survey Score 3:  %    Precautions:  Right Lower Extremity Weight-Bearing Status: Non-weight-bearing  POSTOPERATIVE PLAN:  She will follow the posterolateral corner reconstruction protocol.  Return to clinic in 2 weeks      Subjective   Pt returns to PT after recent s/p PLC revision and clean out secondary to recent fall on L knee. She is 1 week post op, NWB with axillary crutches doing well. Pt referred to PT to re-start posterior lateral corner protocol..  Pain reported as 8/10.      Objective   Bracing  Patient presents with a Left knee brace. The knee brace type is Hinged brace - locked.             Knee Range of Motion   Right Knee   Active (deg) Passive (deg) Pain   Flexion 110       Extension 10           Left Knee   Active (deg) Passive (deg) Pain   Flexion   65     Extension 0                       Ambulation Details    Swing through NWB LLE with crutches         Treatment:  Therapeutic Exercise  TE 1: FOTO  TE 2: PT  reassessment/Updated POC  TE 3: quad sets 10 sec x10  TE 4: ankle prop x5 min  TE 5: AAROM off EOT 10 sec x10  TE 6: ankle pumps and circles x30 ea  TE 7: calf stretch with towel 2x 30 sec hold      Time Entry(in minutes):  PT Re-Evaluation Time Entry: 20  Therapeutic Exercise Time Entry: 25    Assessment & Plan   Assessment  Felicity presents with a condition of Low complexity.   Presentation of Symptoms: Stable  Will Comorbidities Impact Care: No       Functional Limitations: Activity tolerance, Completing self-care activities, Proprioception, Range of motion, Participating in leisure activities, Pain with ADLs/IADLs, Gross motor coordination  Impairments: Pain with functional activity, Impaired physical strength  Personal Factors Affecting Prognosis: Pain    Patient Goal for Therapy (PT): to be able to participate in clinicals for school requirements  Prognosis: Excellent  Assessment Details: Patient demonstrates deficits with range of motion, strength, and function that limit ability to participate in school, work, and recreational activities. They would benefit from skilled PT services to normalize kinetic chain mobility, strength, and function to safely return to their prior level of activity.    Plan  From a physical therapy perspective, the patient would benefit from: Skilled Rehab Services    Planned therapy interventions include: Therapeutic exercise, Therapeutic activities, Neuromuscular re-education, Manual therapy, ADLs/IADLs, Other (Comment), and Gait training. Dry Needling (prn)  Planned modalities to include: Biofeedback, Electrical stimulation - attended, Electrical stimulation - passive/unattended, Thermotherapy (hot pack), and Cryotherapy (cold pack).        Visit Frequency: 2 times Per Week for 24 Weeks.       This plan was discussed with Patient.   Discussion participants: Agreed Upon Plan of Care  Plan details: Frequency and duration of treatment to be adjusted as needed          The patient  will continue to benefit from skilled outpatient physical therapy in order to address the deficits listed in the problem list on the initial evaluation, provide patient and family education, and maximize the patients level of independence in the home and community environments.     The patient's spiritual, cultural, and educational needs were considered, and the patient is agreeable to the plan of care and goals.     Education  Education was done with Patient. The patient's learning style includes Demonstration. The patient Demonstrates understanding.                 Goals:   Active       Functional outcome       Patient will show a significant change in FOTO patient-reported outcome tool to demonstrate subjective improvement to 80% PLOF       Start:  06/11/25    Expected End:  11/26/25            Patient stated goal:to participate in clinicals       Start:  06/11/25    Expected End:  09/03/25            Patient will demonstrate independence in home program for support of progression       Start:  06/11/25    Expected End:  07/23/25               Pain       Patient will report pain of 2/10 demonstrating a reduction of overall pain       Start:  06/11/25    Expected End:  09/03/25               Range of Motion       Patient will achieve left knee ROM of  +10 hyperextension-110 flexion degrees        Start:  06/11/25    Expected End:  09/03/25               Strength       Patient will achieve left knee flexion strength of 5/5       Start:  06/11/25    Expected End:  11/26/25            Patient will achieve left knee extension strength of 5/5       Start:  06/11/25    Expected End:  11/26/25                Deborah Coe, PT

## 2025-06-17 ENCOUNTER — OFFICE VISIT (OUTPATIENT)
Dept: PLASTIC SURGERY | Facility: CLINIC | Age: 25
End: 2025-06-17
Payer: MEDICAID

## 2025-06-17 DIAGNOSIS — G57.32 ENTRAPMENT NEUROPATHY OF LEFT COMMON PERONEAL NERVE: Primary | ICD-10-CM

## 2025-06-17 PROCEDURE — 99213 OFFICE O/P EST LOW 20 MIN: CPT | Mod: PBBFAC | Performed by: SURGERY

## 2025-06-17 PROCEDURE — 99999 PR PBB SHADOW E&M-EST. PATIENT-LVL III: CPT | Mod: PBBFAC,,, | Performed by: SURGERY

## 2025-06-19 ENCOUNTER — OFFICE VISIT (OUTPATIENT)
Dept: SPORTS MEDICINE | Facility: CLINIC | Age: 25
End: 2025-06-19
Payer: MEDICAID

## 2025-06-19 ENCOUNTER — CLINICAL SUPPORT (OUTPATIENT)
Dept: REHABILITATION | Facility: HOSPITAL | Age: 25
End: 2025-06-19
Payer: MEDICAID

## 2025-06-19 VITALS
SYSTOLIC BLOOD PRESSURE: 148 MMHG | DIASTOLIC BLOOD PRESSURE: 86 MMHG | HEIGHT: 65 IN | HEART RATE: 123 BPM | WEIGHT: 293 LBS | BODY MASS INDEX: 48.82 KG/M2

## 2025-06-19 DIAGNOSIS — Z98.890 S/P LEFT KNEE SURGERY: Primary | ICD-10-CM

## 2025-06-19 DIAGNOSIS — S89.92XD INJURY OF POSTEROLATERAL CORNER OF LEFT KNEE, SUBSEQUENT ENCOUNTER: ICD-10-CM

## 2025-06-19 DIAGNOSIS — M25.562 ACUTE PAIN OF LEFT KNEE: Primary | ICD-10-CM

## 2025-06-19 PROCEDURE — 99214 OFFICE O/P EST MOD 30 MIN: CPT | Mod: PBBFAC | Performed by: ORTHOPAEDIC SURGERY

## 2025-06-19 PROCEDURE — 97110 THERAPEUTIC EXERCISES: CPT

## 2025-06-19 PROCEDURE — 99999 PR PBB SHADOW E&M-EST. PATIENT-LVL IV: CPT | Mod: PBBFAC,,, | Performed by: ORTHOPAEDIC SURGERY

## 2025-06-19 RX ORDER — HYDROCODONE BITARTRATE AND ACETAMINOPHEN 5; 325 MG/1; MG/1
1 TABLET ORAL EVERY 6 HOURS PRN
Qty: 21 TABLET | Refills: 0 | Status: SHIPPED | OUTPATIENT
Start: 2025-06-19

## 2025-06-19 NOTE — PROGRESS NOTES
Outpatient Rehab    Physical Therapy Visit    Patient Name: Felicity Cohen  MRN: [id number]  YOB: 2000  Encounter Date: 6/19/2025    Therapy Diagnosis:   Encounter Diagnosis   Name Primary?    Acute pain of left knee Yes     Physician: Jhony Berry MD    Physician Orders: Eval and Treat  Medical Diagnosis: Rupture of anterior cruciate ligament of left knee, initial encounter  Tear of lateral collateral ligament of left knee, initial encounter  Acute medial meniscal injury of left knee, subsequent encounter  Surgical Diagnosis: Not applicable for this Episode   Surgical Date: Not applicable for this Episode  Days Since Last Surgery: Not applicable for this Episode    Visit # / Visits Authorized:  33 / 68  Insurance Authorization Period: 1/1/2025 to 8/17/2025  Date of Evaluation: 10/24/2024  Plan of Care Certification:      Time In:     Time Out:    Total Time (in minutes):     Total Billable Time (in minutes):      FOTO:  Intake Score:  %  Survey Score 2:  %  Survey Score 3:  %    Precautions:       Subjective   Pt reports feeling okay and better at this point than last time..         Objective            Treatment:   Felicity received therapeutic exercises to develop strength, endurance, ROM, flexibility, posture, and core stabilization for 55 minutes including:  Quad set series x30 with 5 sec hold under knee and ankle  NMES x15 min (10 sec on/10 sec off)  AAROM knee flexion 2x10 with 10 sec hold  SLR 3x10  SL hip abd 2x10  Pt education and HEP review    Time Entry(in minutes):       Assessment & Plan   Assessment: Pt responded well to quad focused interventions and demonstrating good quad activation for this early in revision. Plan to continue progressing per pt tolerance per protocol.   Evaluation/Treatment Tolerance: Patient tolerated treatment well    The patient will continue to benefit from skilled outpatient physical therapy in order to address the deficits listed in the problem list on  the initial evaluation, provide patient and family education, and maximize the patients level of independence in the home and community environments.     The patient's spiritual, cultural, and educational needs were considered, and the patient is agreeable to the plan of care and goals.           Plan: Cotninue POC per pt tolerance    Goals:   Active       Functional outcome       Patient will show a significant change in FOTO patient-reported outcome tool to demonstrate subjective improvement to 80% PLOF       Start:  06/11/25    Expected End:  11/26/25            Patient stated goal:to participate in clinicals       Start:  06/11/25    Expected End:  09/03/25            Patient will demonstrate independence in home program for support of progression       Start:  06/11/25    Expected End:  07/23/25               Pain       Patient will report pain of 2/10 demonstrating a reduction of overall pain       Start:  06/11/25    Expected End:  09/03/25               Range of Motion       Patient will achieve left knee ROM of  +10 hyperextension-110 flexion degrees        Start:  06/11/25    Expected End:  09/03/25               Strength       Patient will achieve left knee flexion strength of 5/5       Start:  06/11/25    Expected End:  11/26/25            Patient will achieve left knee extension strength of 5/5       Start:  06/11/25    Expected End:  11/26/25                Deborah Coe, PT

## 2025-06-19 NOTE — PROGRESS NOTES
Subjective:     Chief Complaint: Felicity Cohen is a 25 y.o. female who had concerns including Pain of the Left Knee.    HPI    Patient is here s/p below for 2 week post-op appointment. She reports 6/10 pain and is taking oxycodone as needed for pain.  She denies any nausea, vomiting, fever, chills, shortness of breath, or calf pain. She is attending formal physical therapy at [facility]. T-scope knee brace in place.     DATE OF PROCEDURE: 06/05/2025     SURGEON: Jhony Berry MD     PROCEDURE PERFORMED:   Left revision posterolateral corner reconstruction with allograft   Left knee fasciocutaneous advancement flaps   Left leg scar revision   Left knee arthroscopic lysis of adhesions   Left knee arthroscopic synovectomy   Left knee arthroscopic chondroplasty of the medial femoral condyle   Left knee arthroscopic partial medial meniscectomy  Left knee arthroscopic hardware removal    Review of Systems   Constitutional: Negative.   HENT: Negative.     Eyes: Negative.    Cardiovascular: Negative.    Respiratory: Negative.     Endocrine: Negative.    Hematologic/Lymphatic: Negative.    Skin: Negative.    Musculoskeletal:  Positive for joint pain, joint swelling, muscle weakness, myalgias and stiffness.   Neurological: Negative.    Psychiatric/Behavioral: Negative.     Allergic/Immunologic: Negative.                  Objective:     General: Felicity MENJIVAR is well-developed, well-nourished, appears stated age, in no acute distress, alert and oriented to time, place and person.     General    Constitutional: She is oriented to person, place, and time. She appears well-developed and well-nourished. No distress.   Cardiovascular:  Intact distal pulses.            Neurological: She is alert and oriented to person, place, and time.   Psychiatric: She has a normal mood and affect. Her behavior is normal. Thought content normal.             Left Knee Exam     Inspection   Erythema: absent  Scars: present  Swelling:  present  Effusion: absent  Deformity: absent  Bruising: absent    Range of Motion   Extension:  0   Flexion:  70     Other   Sensation: normal    Comments:  Incision sites healing well, without signs of erythema, infection, or wound dehiscence      Vascular Exam       Left Pulses  Dorsalis Pedis:      2+  Posterior Tibial:      2+        Edema  Left Lower Leg: present          Assessment:     Encounter Diagnoses   Name Primary?    Injury of posterolateral corner of left knee, subsequent encounter     S/P left knee surgery Yes        Plan:     1. Patient instructed to continue to utilize hinged T-scope knee brace until we see him again for 6 week post-op appt. Must be locked in extension during ambulation, but can bend the knee from 0-90 degrees at other times.  She will continue to be nonweightbearing.    2. Suture tags removed Steri-Strips applied. Patient may now shower without covering incision site. Instructed not to submerge incision site in water for another 2 weeks    3. Continue daily ASA and Celebrex b.i.d.    4. Continue [facility] PT per our posterolateral corner recon protocol.     5. RTC to see Jhony Berry MD in 4 weeks for 6 week post-op evaluation      All of the patient's questions were answered. Patient was advised to call the clinic or contact me through the patient portal for any questions or concerns.       Patient questionnaires may have been collected.

## 2025-06-25 ENCOUNTER — CLINICAL SUPPORT (OUTPATIENT)
Dept: REHABILITATION | Facility: HOSPITAL | Age: 25
End: 2025-06-25
Payer: MEDICAID

## 2025-06-25 DIAGNOSIS — M25.562 ACUTE PAIN OF LEFT KNEE: Primary | ICD-10-CM

## 2025-06-25 PROCEDURE — 97110 THERAPEUTIC EXERCISES: CPT | Mod: CQ

## 2025-06-25 NOTE — PROGRESS NOTES
Outpatient Rehab    Physical Therapy Visit    Patient Name: Felicity Cohen  MRN: [id number]  YOB: 2000  Encounter Date: 6/25/2025    Therapy Diagnosis:   Encounter Diagnosis   Name Primary?    Acute pain of left knee Yes       Physician: Jhony Berry MD    Physician Orders: Eval and Treat  Medical Diagnosis: Rupture of anterior cruciate ligament of left knee, initial encounter  Tear of lateral collateral ligament of left knee, initial encounter  Acute medial meniscal injury of left knee, subsequent encounter  Surgical Diagnosis: Not applicable for this Episode   Surgical Date: Not applicable for this Episode  Days Since Last Surgery: Not applicable for this Episode    Visit # / Visits Authorized:  34 / 68  Insurance Authorization Period: 1/1/2025 to 8/17/2025  Date of Evaluation: 10/24/2024  Plan of Care Certification:      Time In: 1503   Time Out: 1553  Total Time (in minutes): 50   Total Billable Time (in minutes): 50    FOTO:  Intake Score:  %  Survey Score 2:  %  Survey Score 3:  %    Precautions:   PLC:  o NWB weeks 0-6  o 50% WB weeks 6-12  o WBAT at 12 weeks        Subjective   doing ok.         Objective            Treatment:   Felicity received therapeutic exercises to develop strength, endurance, ROM, flexibility, posture, and core stabilization for 50 minutes including:  Quad set series x30 with 5 sec hold under knee and ankle  NMES x15 min (10 sec on/30 sec off)    -QS x 5 min   -prone TKE x 10 min  AAROM knee flexion 2x10 with 10 sec hold  SLR 3x10  SL hip abd brace donned  3x10  Prone hip ext brace donned 3x10  Ankle pumps grey band 40x    Time Entry(in minutes):       Assessment & Plan   Assessment: Good quad activation upon arrival. Progress quad exercise with prone TKE. Added hip extension to work on glute strength. No lag with SLR.       The patient will continue to benefit from skilled outpatient physical therapy in order to address the deficits listed in the problem list on  the initial evaluation, provide patient and family education, and maximize the patients level of independence in the home and community environments.     The patient's spiritual, cultural, and educational needs were considered, and the patient is agreeable to the plan of care and goals.           Plan: Cotninue POC per pt tolerance    Goals:   Active       Functional outcome       Patient will show a significant change in FOTO patient-reported outcome tool to demonstrate subjective improvement to 80% PLOF       Start:  06/11/25    Expected End:  11/26/25            Patient stated goal:to participate in clinicals       Start:  06/11/25    Expected End:  09/03/25            Patient will demonstrate independence in home program for support of progression       Start:  06/11/25    Expected End:  07/23/25               Pain       Patient will report pain of 2/10 demonstrating a reduction of overall pain       Start:  06/11/25    Expected End:  09/03/25               Range of Motion       Patient will achieve left knee ROM of  +10 hyperextension-110 flexion degrees        Start:  06/11/25    Expected End:  09/03/25               Strength       Patient will achieve left knee flexion strength of 5/5       Start:  06/11/25    Expected End:  11/26/25            Patient will achieve left knee extension strength of 5/5       Start:  06/11/25    Expected End:  11/26/25                  Edwina Kelly, PTA

## 2025-06-29 NOTE — PROGRESS NOTES
Plastic & Reconstructive Surgery  Progress Note     S:  2 wks s/p Decompression L CPN at knee w/ protector.      Overall ok.   Still with pain, approx 6/10  Burn / ache, no radiation  +itching around incision  No n/t  No other issues     O: There were no vitals taken for this visit.   Gen:  NAD, A&O x3  L Leg: incision c/d/I, no erythema / exudate. Good movement to ankle. No n/t noted.      A/P: 26 y/o female 2 wks s/p Decompression L CPN at knee w/ protector.      Overall the patient is healing well.  I reviewed the intra-op findings with the patient and they are pleased to hear of the successful decompression.  The incision is healing well.  There is no evidence of infection.  I discussed the stages of wound healing and explained the scar will take 6 months to 1 year to fully mature.  I recommended to start using Vaseline or Aquaphor, performing scar massage, and applying sun screen if outside.  The patient verbalized understanding and agreement with this plan. All questions were answered.       Follow up:  prn  Restriction: per Ortho     I spent 15 minutes on this patient encounter which included review of medical records.  Over half the time was spent with the patient face to face discussing the treatment plan, counseling and coordinating care.     Aureliano Rousseau MD  06/29/2025 3:14 PM     This document was transcribed using voice recognition software without a human . It may contain typographical, grammatical, and/or syntax errors.

## 2025-07-02 ENCOUNTER — CLINICAL SUPPORT (OUTPATIENT)
Dept: REHABILITATION | Facility: HOSPITAL | Age: 25
End: 2025-07-02
Payer: MEDICAID

## 2025-07-02 DIAGNOSIS — M25.562 ACUTE PAIN OF LEFT KNEE: Primary | ICD-10-CM

## 2025-07-02 PROCEDURE — 97110 THERAPEUTIC EXERCISES: CPT

## 2025-07-02 NOTE — PROGRESS NOTES
Outpatient Rehab    Physical Therapy Visit    Patient Name: Felicity Cohen  MRN: [id number]  YOB: 2000  Encounter Date: 7/2/2025    Therapy Diagnosis:   Encounter Diagnosis   Name Primary?    Acute pain of left knee Yes       Physician: Jhony Berry MD    Physician Orders: Eval and Treat  Medical Diagnosis: Rupture of anterior cruciate ligament of left knee, initial encounter  Tear of lateral collateral ligament of left knee, initial encounter  Acute medial meniscal injury of left knee, subsequent encounter  Surgical Diagnosis: Not applicable for this Episode   Surgical Date: Not applicable for this Episode  Days Since Last Surgery: Not applicable for this Episode    Visit # / Visits Authorized:  35 / 68  Insurance Authorization Period: 1/1/2025 to 8/17/2025  Date of Evaluation: 10/24/2024  Plan of Care Certification:      Time In: 1500   Time Out: 1550  Total Time (in minutes): 50   Total Billable Time (in minutes):      FOTO:  Intake Score:  %  Survey Score 2:  %  Survey Score 3:  %    Precautions:   PLC:  o NWB weeks 0-6  o 50% WB weeks 6-12  o WBAT at 12 weeks        Subjective   Pt reports two more weeks until she should be d/c from brace..         Objective            Treatment:   Felicity received therapeutic exercises to develop strength, endurance, ROM, flexibility, posture, and core stabilization for 50 minutes including:  Quad set series x30 with 5 sec hold under knee and ankle-np  NMES x 20 min (10 sec on/30 sec off)    -QS x 10 min under knee and 10 min under ankle   -prone TKE x 10 min-np  AAROM knee flexion 2x10 with 10 sec hold at EOT  SLR 3x10 off EOT  SL hip abd 3x10  Prone hip ext  3x10  Ankle pumps grey band 40x    Time Entry(in minutes):  Therapeutic Exercise Time Entry: 50    Assessment & Plan   Assessment: Pt tolerated SLR off edge of table well and does not need brace since she able to maintain TKE. Plan to continue progressing per protocol.   Evaluation/Treatment  Tolerance: Patient tolerated treatment well    The patient will continue to benefit from skilled outpatient physical therapy in order to address the deficits listed in the problem list on the initial evaluation, provide patient and family education, and maximize the patients level of independence in the home and community environments.     The patient's spiritual, cultural, and educational needs were considered, and the patient is agreeable to the plan of care and goals.           Plan: Cotninue POC per pt tolerance    Goals:   Active       Functional outcome       Patient will show a significant change in FOTO patient-reported outcome tool to demonstrate subjective improvement to 80% PLOF       Start:  06/11/25    Expected End:  11/26/25            Patient stated goal:to participate in clinicals       Start:  06/11/25    Expected End:  09/03/25            Patient will demonstrate independence in home program for support of progression       Start:  06/11/25    Expected End:  07/23/25               Pain       Patient will report pain of 2/10 demonstrating a reduction of overall pain       Start:  06/11/25    Expected End:  09/03/25               Range of Motion       Patient will achieve left knee ROM of  +10 hyperextension-110 flexion degrees        Start:  06/11/25    Expected End:  09/03/25               Strength       Patient will achieve left knee flexion strength of 5/5       Start:  06/11/25    Expected End:  11/26/25            Patient will achieve left knee extension strength of 5/5       Start:  06/11/25    Expected End:  11/26/25                  Deborah Coe, PT

## 2025-07-07 ENCOUNTER — TELEPHONE (OUTPATIENT)
Dept: SPORTS MEDICINE | Facility: CLINIC | Age: 25
End: 2025-07-07
Payer: MEDICAID

## 2025-07-07 NOTE — TELEPHONE ENCOUNTER
Spoke to the patient to try to r/s appt on the July 25th but patient decided to keep the appointment instead.

## 2025-07-09 ENCOUNTER — CLINICAL SUPPORT (OUTPATIENT)
Dept: REHABILITATION | Facility: HOSPITAL | Age: 25
End: 2025-07-09
Payer: MEDICAID

## 2025-07-09 DIAGNOSIS — M25.562 ACUTE PAIN OF LEFT KNEE: Primary | ICD-10-CM

## 2025-07-09 PROCEDURE — 97110 THERAPEUTIC EXERCISES: CPT

## 2025-07-09 NOTE — PROGRESS NOTES
Outpatient Rehab    Physical Therapy Visit    Patient Name: Felicity Cohen  MRN: [id number]  YOB: 2000  Encounter Date: 7/9/2025    Therapy Diagnosis:   Encounter Diagnosis   Name Primary?    Acute pain of left knee Yes       Physician: Jhony Berry MD    Physician Orders: Eval and Treat  Medical Diagnosis: Rupture of anterior cruciate ligament of left knee, initial encounter  Tear of lateral collateral ligament of left knee, initial encounter  Acute medial meniscal injury of left knee, subsequent encounter  Surgical Diagnosis: Not applicable for this Episode   Surgical Date: Not applicable for this Episode  Days Since Last Surgery: Not applicable for this Episode    Visit # / Visits Authorized:  36 / 68  Insurance Authorization Period: 1/1/2025 to 8/17/2025  Date of Evaluation: 10/24/2024  Plan of Care Certification:      Time In: 1435   Time Out: 1530  Total Time (in minutes): 55   Total Billable Time (in minutes):      FOTO:  Intake Score:  %  Survey Score 2:  %  Survey Score 3:  %    Precautions:   PLC:  o NWB weeks 0-6  o 50% WB weeks 6-12  o WBAT at 12 weeks        Subjective   Pt reports 5 weeks post op and ready to start the bike next week..         Objective        0-90 deg     Treatment:   Felicity received therapeutic exercises to develop strength, endurance, ROM, flexibility, posture, and core stabilization for 55 minutes including:  NMES x 20 min (10 sec on/30 sec off)    -QS x 10 min under knee and 10 min under ankle   -prone TKE    - SLR off EOT  AAROM knee flexion 2x10 with 10 sec hold at EOT  SL hip abd 3x10  SLR 3x10  Prone hip ext  3x10  Ankle pumps grey band 40x-np  STM x5 min for scar tissue management and patellar mobs    Time Entry(in minutes):  Therapeutic Exercise Time Entry: 55    Assessment & Plan   Assessment: Pt achieved 90 deg passive knee flexion this session, so will progress as expected to next phase of rehab per protocol.   Evaluation/Treatment Tolerance: Patient  tolerated treatment well    The patient will continue to benefit from skilled outpatient physical therapy in order to address the deficits listed in the problem list on the initial evaluation, provide patient and family education, and maximize the patients level of independence in the home and community environments.     The patient's spiritual, cultural, and educational needs were considered, and the patient is agreeable to the plan of care and goals.           Plan: Cotninue POC per pt tolerance    Goals:   Active       Functional outcome       Patient will show a significant change in FOTO patient-reported outcome tool to demonstrate subjective improvement to 80% PLOF       Start:  06/11/25    Expected End:  11/26/25            Patient stated goal:to participate in clinicals       Start:  06/11/25    Expected End:  09/03/25            Patient will demonstrate independence in home program for support of progression       Start:  06/11/25    Expected End:  07/23/25               Pain       Patient will report pain of 2/10 demonstrating a reduction of overall pain       Start:  06/11/25    Expected End:  09/03/25               Range of Motion       Patient will achieve left knee ROM of  +10 hyperextension-110 flexion degrees        Start:  06/11/25    Expected End:  09/03/25               Strength       Patient will achieve left knee flexion strength of 5/5       Start:  06/11/25    Expected End:  11/26/25            Patient will achieve left knee extension strength of 5/5       Start:  06/11/25    Expected End:  11/26/25                  Deborah Coe, PT

## 2025-07-15 ENCOUNTER — HOSPITAL ENCOUNTER (EMERGENCY)
Facility: HOSPITAL | Age: 25
Discharge: HOME | End: 2025-07-15
Attending: EMERGENCY MEDICINE
Payer: MEDICAID

## 2025-07-15 VITALS
HEART RATE: 101 BPM | OXYGEN SATURATION: 99 % | BODY MASS INDEX: 48.82 KG/M2 | SYSTOLIC BLOOD PRESSURE: 133 MMHG | RESPIRATION RATE: 14 BRPM | HEIGHT: 65 IN | DIASTOLIC BLOOD PRESSURE: 89 MMHG | WEIGHT: 293 LBS | TEMPERATURE: 98 F

## 2025-07-15 DIAGNOSIS — R07.9 CHEST PAIN: ICD-10-CM

## 2025-07-15 DIAGNOSIS — R00.0 TACHYCARDIA: Primary | ICD-10-CM

## 2025-07-15 LAB
ALBUMIN SERPL-MCNC: 3.8 G/DL (ref 3.3–5.5)
ALP SERPL-CCNC: 65 U/L (ref 42–141)
B-HCG UR QL: NEGATIVE
BILIRUB SERPL-MCNC: 0.5 MG/DL (ref 0.2–1.6)
BUN SERPL-MCNC: 12 MG/DL (ref 7–22)
CALCIUM SERPL-MCNC: 9.7 MG/DL (ref 8–10.3)
CHLORIDE SERPL-SCNC: 107 MMOL/L (ref 98–108)
CREAT SERPL-MCNC: 0.7 MG/DL (ref 0.6–1.2)
CTP QC/QA: YES
GLUCOSE SERPL-MCNC: 94 MG/DL (ref 73–118)
HCT, POC: NORMAL
HGB, POC: NORMAL (ref 14–18)
MCH, POC: NORMAL
MCHC, POC: NORMAL
MCV, POC: NORMAL
MPV, POC: NORMAL
POC ALT (SGPT): 27 U/L (ref 10–47)
POC AST (SGOT): 28 U/L (ref 11–38)
POC B-TYPE NATRIURETIC PEPTIDE: <5 PG/ML (ref 0–100)
POC CARDIAC TROPONIN I: 0 NG/ML (ref 0–0.08)
POC D-DI: 1320 NG/ML (ref 0–450)
POC PLATELET COUNT: NORMAL
POC TCO2: 27 MMOL/L (ref 18–33)
POTASSIUM BLD-SCNC: 4.5 MMOL/L (ref 3.6–5.1)
PROTEIN, POC: 7.3 G/DL (ref 6.4–8.1)
RBC, POC: NORMAL
RDW, POC: NORMAL
SAMPLE: NORMAL
SODIUM BLD-SCNC: 139 MMOL/L (ref 128–145)
WBC, POC: NORMAL

## 2025-07-15 PROCEDURE — 81025 URINE PREGNANCY TEST: CPT | Mod: ER

## 2025-07-15 PROCEDURE — 83880 ASSAY OF NATRIURETIC PEPTIDE: CPT | Mod: ER

## 2025-07-15 PROCEDURE — 25500020 PHARM REV CODE 255: Mod: ER | Performed by: EMERGENCY MEDICINE

## 2025-07-15 PROCEDURE — 80053 COMPREHEN METABOLIC PANEL: CPT | Mod: ER

## 2025-07-15 PROCEDURE — 93010 ELECTROCARDIOGRAM REPORT: CPT | Mod: ,,, | Performed by: INTERNAL MEDICINE

## 2025-07-15 PROCEDURE — 85025 COMPLETE CBC W/AUTO DIFF WBC: CPT | Mod: ER

## 2025-07-15 PROCEDURE — 93005 ELECTROCARDIOGRAM TRACING: CPT | Mod: ER

## 2025-07-15 PROCEDURE — 84484 ASSAY OF TROPONIN QUANT: CPT | Mod: ER

## 2025-07-15 PROCEDURE — 99285 EMERGENCY DEPT VISIT HI MDM: CPT | Mod: 25,ER

## 2025-07-15 RX ADMIN — IOHEXOL 100 ML: 350 INJECTION, SOLUTION INTRAVENOUS at 06:07

## 2025-07-15 NOTE — ED PROVIDER NOTES
"Encounter Date: 7/15/2025    SCRIBE #1 NOTE: I, Mari Bueno, am scribing for, and in the presence of,  Elvia Cordova PA-C. I have scribed the following portions of the note - Other sections scribed: HPI, ROS, Physical Exam.       History     Chief Complaint   Patient presents with    Chest Pain     Chest pain starting 4 days ago.      Felicity Cohen is a 25 y.o. female, with a PMHx of GERD, anxiety,ovarian cysts, who presents to the ED with chest pain onset a few months prior. Patient reports it feels like there is a "knot" in her breast that occasionally moves but typically stays in one place. She reports a sharp, overlying pain. Patient additionally notes a high heart rate for the past few months. Of note, patient has had two knee surgeries in the past year. Per chart review, she received a chondroplasty of the left knee joint as well as a knee reconstruction on 06/05/25. Patient had previously received a ligament reconstruction, meniscus repair, and nerve  in her left knee on 12/12/24. She was previously taking Asprin, Celebrex and Methocarbamol until 06/05. Denies any worsening leg swelling. No other exacerbating or alleviating factors. Patient does not currently take any daily medications, endorses an allergy to Ibuprofen.    The history is provided by the patient and medical records. No  was used.     Review of patient's allergies indicates:   Allergen Reactions    Ibuprofen Hives     Past Medical History:   Diagnosis Date    ADHD (attention deficit hyperactivity disorder)     Anxiety     Constipation     Depression     GERD (gastroesophageal reflux disease)     IBS (irritable bowel syndrome)     Ovarian cyst     Schizophrenia in children      Past Surgical History:   Procedure Laterality Date    ARTHROSCOPIC CHONDROPLASTY OF KNEE JOINT Left 6/5/2025    Procedure: ARTHROSCOPY, KNEE, W/ CHONDROPLASTY;  Surgeon: Jhony Berry MD;  Location: [facility] OR [address] [place] [address];  Service: Orthopedics;  " Laterality: Left;    ARTHROSCOPIC RECONSTRUCTION OF POSTEROLATERAL CORNER OF KNEE Left 6/5/2025    Procedure: RECONSTRUCTION, KNEE, POSTEROLATERAL CORNER;  Surgeon: Jhony Berry MD;  Location: [address];  Service: Orthopedics;  Laterality: Left;    ARTHROSCOPY,KNEE,WITH MENISCUS REPAIR Left 12/12/2024    Procedure: ARTHROSCOPY,KNEE,WITH MENISCUS REPAIR;  Surgeon: Jhony Berry MD;  Location: [address];  Service: Orthopedics;  Laterality: Left;    DECOMPRESSION OF NERVE Left 12/12/2024    Procedure: DECOMPRESSION, NERVE;  Surgeon: Jhony Berry MD;  [facility];  Service: Orthopedics;  Laterality: Left;    DECOMPRESSION, NERVE, PERONEAL Left 6/5/2025    Procedure: DECOMPRESSION, PERONEAL NERVE;  Surgeon: Aureliano Rousseau MD;  Location: [address];  Service: Plastics;  Laterality: Left;    ESOPHAGOGASTRODUODENOSCOPY N/A 2/8/2022    Procedure: EGD (ESOPHAGOGASTRODUODENOSCOPY);  Surgeon: Berkley Mckenna MD;  Location: [facility];  Service: Endoscopy;  Laterality: N/A;    KNEE ARTHROSCOPY W/ MENISCECTOMY Left 6/5/2025    Procedure: ARTHROSCOPY, KNEE, W/ MENISCECTOMY;  Surgeon: Jhony Berry MD;  Location: [address];  Service: Orthopedics;  Laterality: Left;    KNEE ARTHROSCOPY W/ PLICA EXCISION Left 6/5/2025    Procedure: ARTHROSCOPY, KNEE, W/ PLICA EXCISION;  Surgeon: Jhony Berry MD;  Location: [address];  Service: Orthopedics;  Laterality: Left;    LYSIS, ADHESIONS, KNEE, ARTHROSCOPIC Left 6/5/2025    Procedure: ARTHROSCOPY, KNEE, W/ LYSIS OF ADHESIONS;  Surgeon: Jhony Berry MD;  Location: [address];  Service: Orthopedics;  Laterality: Left;    RECONSTRUCTION OF ANTERIOR CRUCIATE LIGAMENT USING GRAFT Left 12/12/2024    Procedure: RECONSTRUCTION, KNEE, ACL, QUAD TENDON AUTOGRAFT;  Surgeon: Jhony Berry MD;  Location: [facility] OR 2ND FLR;  Service: Orthopedics;  Laterality: Left;    RECONSTRUCTION OF LIGAMENT Left 12/12/2024     Procedure: RECONSTRUCTION, POSTEROLATERAL CORNER (PLC) WITH ALLOGRAFT;  Surgeon: Jhony Berry MD;  [facility];  Service: Orthopedics;  Laterality: Left;    RECONSTRUCTION OF POSTEROLATERAL CORNER OF KNEE Left 6/5/2025    Procedure: REVISION RECONSTRUCTION, KNEE, POSTEROLATERAL CORNER;  Surgeon: Jhony Berry MD;  [facility];  Service: Orthopedics;  Laterality: Left;    RECONSTRUCTION, LIGAMENT, KNEE, EXTRA-ARTICULAR Left 12/12/2024    Procedure: RECONSTRUCTION, LIGAMENT, KNEE, EXTRA-ARTICULAR;  Surgeon: Jhony Berry MD;  [facility];  Service: Orthopedics;  Laterality: Left;    SYNOVECTOMY, KNEE, LIMITED, ARTHROSCOPIC Left 12/12/2024    Procedure: SYNOVECTOMY, KNEE, LIMITED, ARTHROSCOPIC;  Surgeon: Jhony Berry MD;  [facility];  Service: Orthopedics;  Laterality: Left;    WISDOM TOOTH EXTRACTION       Family History   Problem Relation Name Age of Onset    Asthma Mother      Diabetes Father       Social History[1]  Review of Systems   Constitutional:  Negative for chills and fever.   HENT:  Negative for congestion and sore throat.    Eyes:  Negative for visual disturbance.   Respiratory:  Negative for cough and shortness of breath.    Cardiovascular:  Positive for chest pain. Negative for leg swelling.        (+) increased heart rate   Gastrointestinal:  Negative for abdominal pain, nausea and vomiting.   Genitourinary:  Negative for dysuria and vaginal discharge.   Skin:  Negative for rash.   Neurological:  Negative for headaches.   Psychiatric/Behavioral:  Negative for decreased concentration.        Physical Exam     Initial Vitals [07/15/25 1705]   BP Pulse Resp Temp SpO2   (!) 168/76 (!) 120 20 98.4 °F (36.9 °C) 99 %      MAP       --         Physical Exam    Nursing note and vitals reviewed.  Constitutional: She appears well-developed and well-nourished. She is not diaphoretic. No distress.   HENT:   Head: Normocephalic and  atraumatic.   Right Ear: External ear normal.   Left Ear: External ear normal.   Eyes: Conjunctivae and EOM are normal.   Neck: No tracheal deviation present. No JVD present.   Normal range of motion.  Cardiovascular:  Regular rhythm.   Tachycardia present.         Pulmonary/Chest: Effort normal and breath sounds normal. No stridor. No respiratory distress.   Musculoskeletal:      Cervical back: Normal range of motion.      Comments: Left knee in knee brace.      Neurological: She is alert and oriented to person, place, and time.   Skin: No rash noted. No erythema.   Psychiatric: She has a normal mood and affect.         ED Course   Procedures  Labs Reviewed   POCT D DIMER - Abnormal       Result Value    POC D-DI 1320 (*)    TROPONIN ISTAT    POC Cardiac Troponin I 0.00      Sample unknown     POCT URINE PREGNANCY    POC Preg Test, Ur Negative       Acceptable Yes     POCT CBC    Hematocrit        Hemoglobin        RBC        WBC        MCV        MCH, POC        MCHC        RDW-CV        Platelet Count, POC        MPV       POCT CMP   POCT TROPONIN   POCT B-TYPE NATRIURETIC PEPTIDE (BNP)   POCT CMP    Albumin, POC 3.8      Alkaline Phosphatase, POC 65      ALT (SGPT), POC 27      AST (SGOT), POC 28      POC BUN 12      Calcium, POC 9.7      POC Chloride 107      POC Creatinine 0.7      POC Glucose 94      POC Potassium 4.5      POC Sodium 139      Bilirubin, POC 0.5      POC TCO2 27      Protein, POC 7.3     POCT B-TYPE NATRIURETIC PEPTIDE (BNP)    POC B-Type Natriuretic Peptide <5.0     POCT D DIMER          Imaging Results              CTA Chest Non-Coronary (PE Studies) (Final result)  Result time 07/15/25 19:18:19      Final result by Delmer Booth MD (07/15/25 19:18:19)                   Impression:    IMPRESSION:  Significantly limited exam for PE detection due to patient body habitus. No large central pulmonary embolism is evident. Remaining pulmonary artery branches not well  "assessed.    No other evidence of an acute cardiopulmonary process.    -Electronically Signed By: Delmer Booth MD   -Electronically Signed On:  7/15/2025 7:18 PM      Report Ends               Narrative:    EXAM: CT angiogram chest with contrast    HISTORY: shortness of breath    TECHNIQUE: Postcontrast axial images through the chest were obtained with contrast bolus timing per PE protocol. Additional 3-D postprocessing was performed with minimum intensity projection images generated on a separate workstation. All CT scans are performed using dose optimization techniques as appropriate to a performed exam including automated exposure control and/or standardized protocols for targeted exams where dose is matched to indication/reason for exam/patient size, where available.    COMPARISON: None    FINDINGS:  Contrast bolus timing for detection of pulmonary embolism is satisfactory. Exam sensitivity is significantly diminished due to artifact from patient body. No large central pulmonary embolism is evident. Remaining pulmonary artery branches not well assessed due to artifact.    No airspace consolidation, pleural effusion, or pneumothorax. Large airways appear unremarkable. No mediastinal adenopathy. No pericardial effusion. Thoracic aorta and proximal branch vessels are normal in contour and caliber. Soft tissues of the chest are unremarkable. Limited images of the upper abdomen show no evidence of an acute process. There is no acute bony abnormality.                                       Medications   iohexoL (OMNIPAQUE 350) injection 100 mL (100 mLs Intravenous Given 7/15/25 1811)     Medical Decision Making  Felicity Cohen is a 25 y.o. female, with a PMHx of GERD, anxiety,ovarian cysts, who presents to the ED with chest pain onset a few months prior. Patient reports it feels like there is a "knot" in her breast that occasionally moves but typically stays in one place. She reports a sharp, overlying pain. " Patient additionally notes a high heart rate for the past few months. Of note, patient has had two knee surgeries in the past year. Per chart review, she received a chondroplasty of the left knee joint as well as a knee reconstruction on 06/05/25. Patient had previously received a ligament reconstruction, meniscus repair, and nerve  in her left knee on 12/12/24. She was previously taking Asprin, Celebrex and Methocarbamol until 06/05. Denies any worsening leg swelling. No other exacerbating or alleviating factors. Patient does not currently take any daily medications, endorses an allergy to Ibuprofen.    Differentials include but are not limited to ACS/MI, PE, aortic dissection, pneumothorax, cardiac tamponade, pericarditis/myocarditis, esophageal rupture, pneumonia, infection/abscess, lung mass, trauma/fracture, costochondritis/pleurisy, MSK pain/contusion, GERD, biliary disease, pancreatitis, anemia    CBC without leukocytosis, CMP unremarkable.  EKG reassuring.  Wells score of 6, D-dimer elevated.  Cta PE significantly limited for PE detection due to patient's body habitus.  No large central pulmonary embolism evident.  Remaining pulmonary artery branches not well assessed.  Patient's tachycardia improved without intervention.  Discussed with the patient her findings, she is comfortable with discharge and following up.  She states that she has an appointment with her orthopedic next week.  We will refer patient to primary care.  Patient given very strict return precautions.  All questions and concerns addressed prior to discharge.  Patient given return precautions. I discussed with the patient the diagnosis, treatment plan, indications for return to the emergency department, and for expected follow-up. The patient verbalized an understanding. The patient is asked if there are any questions or concerns. We discuss the case, until all issues are addressed to the patient's satisfaction. Patient understands and is  agreeable to the plan.      Amount and/or Complexity of Data Reviewed  External Data Reviewed: notes.     Details: See HPI.  Labs: ordered. Decision-making details documented in ED Course.  Radiology: ordered. Decision-making details documented in ED Course.  ECG/medicine tests: ordered.    Risk  Prescription drug management.            Scribe Attestation:   Scribe #1: I performed the above scribed service and the documentation accurately describes the services I performed. I attest to the accuracy of the note.        ED Course as of 07/15/25 2217   Tue Jul 15, 2025   1715 EKG interpreted by Dr. Sanchez.  No STEMI.  Sinus tachycardia, ventricular rate of 124.  Normal axis.  Abnormal EKG.  .  When compared to previous EKG done on 06/08/2025 rate has increased by 26 beats per minute today [RF]      ED Course User Index  [RF] Danna Sanchez, DO MILLER, Elvia Cordova PA-C, personally performed the services described in this documentation. All medical record entries made by the scribe were at my direction and in my presence. I have reviewed the chart and agree that the record reflects my personal performance and is accurate and complete.      DISCLAIMER: This note was prepared with [employer] voice recognition transcription software. Garbled syntax, mangled pronouns, and other bizarre constructions may be attributed to that software system.     Clinical Impression:  Final diagnoses:  [R07.9] Chest pain  [R00.0] Tachycardia (Primary)          ED Disposition Condition    Discharge Stable          ED Prescriptions    None       Follow-up Information       Follow up With Specialties Details Why Contact Info    [facility] ED Emergency Medicine Go to  for new or worsening symptoms [address] [facility] [address] [phone]    [facility] -  Call in 3 days As needed [address] [facility] [address]  [phone]      [facility] -  Call in 3  days As needed [address]  [place] [address]  [phone]                     [1]   Social History  Tobacco Use    Smoking status: Never     Passive exposure: Yes    Smokeless tobacco: Never   Substance Use Topics    Alcohol use: No    Drug use: Never        Elvia Cordova PA-C  07/15/25 [id number]

## 2025-07-15 NOTE — ED TRIAGE NOTES
L knee surgery June 2nd. Pt is reporting sob with exertion and left sided chest pain x3 months. Pt is tachycardic, which she says has been normal for her for the past month. Denies new leg swelling. Not on blood thinners

## 2025-07-16 ENCOUNTER — PATIENT MESSAGE (OUTPATIENT)
Dept: SPORTS MEDICINE | Facility: CLINIC | Age: 25
End: 2025-07-16
Payer: MEDICAID

## 2025-07-16 NOTE — DISCHARGE INSTRUCTIONS
Thank you for coming to our Emergency Department today. It is important to remember that some problems or medical conditions are difficult to diagnose and may not be found or addressed during your Emergency Department visit.  These conditions often start with non-specific symptoms and can only be diagnosed on follow up visits with your primary care physician or specialist when the symptoms continue or change. Please remember that all medical conditions can change, and we cannot predict how you will be feeling tomorrow or the next day. Return to the ER with any questions/concerns, new/concerning symptoms, worsening or failure to improve.       Be sure to follow up with your primary care doctor and review all labs/imaging/tests that were performed during your ER visit with them. It is very common for us to identify non-emergent incidental findings which must be followed up with your primary care physician.  Some labs/imaging/tests may be outside of the normal range, and require non-emergent follow-up and/or further investigation/treatment/procedures/testing to help diagnose/exclude/prevent complications or other potentially serious medical conditions. Some abnormalities may not have been discussed or addressed during your ER visit.     An ER visit does not replace a primary care visit, and many screening tests or follow-up tests cannot be ordered by an ER doctor or performed by the ER. Some tests may even require pre-approval.    If you do not have a primary care doctor, you may contact the one listed on your discharge paperwork or you may also call the [facility] Appointment Desk at [phone] , or [address] [place] [address] at  [phone] to schedule an appointment, or establish care with a primary care doctor or even a specialist and to obtain information about local resources. It is important to your health that you have a primary care doctor.    Please take all medications as directed. We have done our best to select  a medication for you that will treat your condition however, all medications may potentially have side-effects and it is impossible to predict which medications may give you side-effects or what those side-effects (if any) those medications may give you.  If you feel that you are having a negative effect or side-effect of any medication you should stop taking those medications immediately and seek medical attention. If you feel that you are having a life-threatening reaction call 911.        Do not drive, swim, climb to height, take a bath, operate heavy machinery, drink alcohol or take potentially sedating medications, sign any legal documents or make any important decisions for 24 hours if you have received any pain medications, sedatives or mood altering drugs during your ER visit or within 24 hours of taking them if they have been prescribed to you.     You can find additional resources for Dentists, hearing aids, durable medical equipment, low cost pharmacies and other resources at [email or web address]

## 2025-07-17 LAB
OHS QRS DURATION: 72 MS
OHS QTC CALCULATION: 456 MS

## 2025-07-25 ENCOUNTER — CLINICAL SUPPORT (OUTPATIENT)
Dept: REHABILITATION | Facility: HOSPITAL | Age: 25
End: 2025-07-25
Payer: MEDICAID

## 2025-07-25 DIAGNOSIS — M25.562 ACUTE PAIN OF LEFT KNEE: Primary | ICD-10-CM

## 2025-07-25 PROCEDURE — 97110 THERAPEUTIC EXERCISES: CPT

## 2025-07-25 NOTE — PROGRESS NOTES
Outpatient Rehab    Physical Therapy Progress Note    Patient Name: Felicity Cohen  MRN: [id number]  YOB: 2000  Encounter Date: 7/25/2025    Therapy Diagnosis:   Encounter Diagnosis   Name Primary?    Acute pain of left knee Yes     Physician: Jhony Berry MD    Physician Orders: Eval and Treat  Medical Diagnosis: Rupture of anterior cruciate ligament of left knee, initial encounter  Tear of lateral collateral ligament of left knee, initial encounter  Acute medial meniscal injury of left knee, subsequent encounter  Surgical Diagnosis: Not applicable for this Episode   Surgical Date: Not applicable for this Episode  Days Since Last Surgery: Not applicable for this Episode    Visit # / Visits Authorized:  37 / 68  Insurance Authorization Period: 1/1/2025 to 8/17/2025  Date of Evaluation: 10/24/2024  Plan of Care Certification:       PT/PTA:     Number of PTA visits since last PT visit:   Time In: 1245   Time Out: 1340  Total Time (in minutes): 55   Total Billable Time (in minutes):      FOTO:  Intake Score (%): Not applicable for this Episode  Survey Score 2 (%): Not applicable for this Episode  Survey Score 3 (%): Not applicable for this Episode    Precautions: DOS: 6/5/25  Right Lower Extremity Weight-Bearing Status: Non-weight-bearing  Additional Precautions and Protocol Details: POSTOPERATIVE PLAN:  She will follow the posterolateral corner reconstruction protocol.  Return to clinic in 2 weeks  PROCEDURE PERFORMED:   Left revision posterolateral corner reconstruction with allograft   Left knee fasciocutaneous advancement flaps   Left leg scar revision   Left knee arthroscopic lysis of adhesions   Left knee arthroscopic synovectomy   Left knee arthroscopic chondroplasty of the medial femoral condyle   Left knee arthroscopic partial medial meniscectomy  Left knee arthroscopic hardware removal        Subjective   Good follow up with MD but needs to continue brace for 6 more weeks with gradual WB  progression..  Pain reported as 5/10.      Objective            Treatment:      Felicity received therapeutic exercises to develop strength, endurance, ROM, flexibility, posture, and core stabilization for 55 minutes including:  Bike x6 min in available ROM  NMES x 20 min (10 sec on/30 sec off)               -QS x 10 min under knee and 10 min under ankle              -prone TKE               - SLR off EOT  AAROM knee flexion 2x10 with 10 sec hold at EOT  Heelslides (passively) x5 min  SL hip abd 2x10 1#  SLR 2x10 1#  Prone hip ext  2x10 1#  Ankle pumps grey band 40x-np  STM x5 min for scar tissue management and patellar mobs    Time Entry(in minutes):  Therapeutic Exercise Time Entry: 55    Assessment & Plan   Assessment: Pt was progressed to new interventions per protocol and responded well. Pt also educated on 25% WB with crutches. Plan to continue gradually progressing WB as tolerated with brace. Pt would also greatly benefit from adding aquatic therapy 1x a week with land 1x a week.   Evaluation/Treatment Tolerance: Patient tolerated treatment well    The patient will continue to benefit from skilled outpatient physical therapy in order to address the deficits listed in the problem list on the initial evaluation, provide patient and family education, and maximize the patients level of independence in the home and community environments.     The patient's spiritual, cultural, and educational needs were considered, and the patient is agreeable to the plan of care and goals.           Plan: Mahendra POC per pt tolerance    Goals:   Active       Functional outcome       Patient will show a significant change in FOTO patient-reported outcome tool to demonstrate subjective improvement to 80% PLOF       Start:  06/11/25    Expected End:  11/26/25            Patient stated goal:to participate in clinicals       Start:  06/11/25    Expected End:  09/03/25            Patient will demonstrate independence in home program  for support of progression       Start:  06/11/25    Expected End:  07/23/25               Pain       Patient will report pain of 2/10 demonstrating a reduction of overall pain       Start:  06/11/25    Expected End:  09/03/25               Range of Motion       Patient will achieve left knee ROM of  +10 hyperextension-110 flexion degrees        Start:  06/11/25    Expected End:  09/03/25               Strength       Patient will achieve left knee flexion strength of 5/5       Start:  06/11/25    Expected End:  11/26/25            Patient will achieve left knee extension strength of 5/5       Start:  06/11/25    Expected End:  11/26/25                Deborah Coe, PT

## 2025-07-31 ENCOUNTER — CLINICAL SUPPORT (OUTPATIENT)
Dept: REHABILITATION | Facility: HOSPITAL | Age: 25
End: 2025-07-31
Payer: MEDICAID

## 2025-07-31 DIAGNOSIS — M25.562 ACUTE PAIN OF LEFT KNEE: Primary | ICD-10-CM

## 2025-07-31 PROCEDURE — 97110 THERAPEUTIC EXERCISES: CPT

## 2025-08-04 ENCOUNTER — OFFICE VISIT (OUTPATIENT)
Facility: CLINIC | Age: 25
End: 2025-08-04
Payer: MEDICAID

## 2025-08-04 ENCOUNTER — LAB VISIT (OUTPATIENT)
Dept: LAB | Facility: HOSPITAL | Age: 25
End: 2025-08-04
Payer: MEDICAID

## 2025-08-04 VITALS
RESPIRATION RATE: 18 BRPM | WEIGHT: 293 LBS | HEIGHT: 65 IN | DIASTOLIC BLOOD PRESSURE: 72 MMHG | OXYGEN SATURATION: 98 % | TEMPERATURE: 97 F | SYSTOLIC BLOOD PRESSURE: 122 MMHG | BODY MASS INDEX: 48.82 KG/M2 | HEART RATE: 92 BPM

## 2025-08-04 DIAGNOSIS — G89.29 CHRONIC PAIN OF LEFT KNEE: ICD-10-CM

## 2025-08-04 DIAGNOSIS — Z01.419 ENCOUNTER FOR WELL WOMAN EXAM: ICD-10-CM

## 2025-08-04 DIAGNOSIS — L20.9 ATOPIC DERMATITIS, UNSPECIFIED TYPE: ICD-10-CM

## 2025-08-04 DIAGNOSIS — Z76.89 ENCOUNTER TO ESTABLISH CARE WITH NEW PROVIDER: Primary | ICD-10-CM

## 2025-08-04 DIAGNOSIS — R07.89 OTHER CHEST PAIN: ICD-10-CM

## 2025-08-04 DIAGNOSIS — R00.0 TACHYCARDIA: ICD-10-CM

## 2025-08-04 DIAGNOSIS — M25.562 CHRONIC PAIN OF LEFT KNEE: ICD-10-CM

## 2025-08-04 DIAGNOSIS — Z76.89 ENCOUNTER TO ESTABLISH CARE WITH NEW PROVIDER: ICD-10-CM

## 2025-08-04 DIAGNOSIS — K21.9 GASTROESOPHAGEAL REFLUX DISEASE WITHOUT ESOPHAGITIS: ICD-10-CM

## 2025-08-04 PROBLEM — L21.9 SEBORRHEIC DERMATITIS: Status: ACTIVE | Noted: 2025-08-04

## 2025-08-04 PROBLEM — L29.9 PRURITUS: Status: ACTIVE | Noted: 2025-08-04

## 2025-08-04 PROBLEM — L30.4 INTERTRIGO: Status: ACTIVE | Noted: 2025-08-04

## 2025-08-04 PROBLEM — L85.3 ASTEATOSIS CUTIS: Status: ACTIVE | Noted: 2025-08-04

## 2025-08-04 LAB
ABSOLUTE EOSINOPHIL (OHS): 0.12 K/UL
ABSOLUTE MONOCYTE (OHS): 0.56 K/UL (ref 0.3–1)
ABSOLUTE NEUTROPHIL COUNT (OHS): 5.18 K/UL (ref 1.8–7.7)
ALBUMIN SERPL BCP-MCNC: 4.1 G/DL (ref 3.5–5.2)
ALP SERPL-CCNC: 81 UNIT/L (ref 40–150)
ALT SERPL W/O P-5'-P-CCNC: 19 UNIT/L (ref 10–44)
ANION GAP (OHS): 9 MMOL/L (ref 8–16)
AST SERPL-CCNC: 23 UNIT/L (ref 11–45)
BASOPHILS # BLD AUTO: 0.03 K/UL
BASOPHILS NFR BLD AUTO: 0.4 %
BILIRUB SERPL-MCNC: 0.2 MG/DL (ref 0.1–1)
BUN SERPL-MCNC: 12 MG/DL (ref 6–20)
CALCIUM SERPL-MCNC: 9.8 MG/DL (ref 8.7–10.5)
CHLORIDE SERPL-SCNC: 107 MMOL/L (ref 95–110)
CHOLEST SERPL-MCNC: 199 MG/DL (ref 120–199)
CHOLEST/HDLC SERPL: 4.7 {RATIO} (ref 2–5)
CO2 SERPL-SCNC: 27 MMOL/L (ref 23–29)
CREAT SERPL-MCNC: 0.7 MG/DL (ref 0.5–1.4)
ERYTHROCYTE [DISTWIDTH] IN BLOOD BY AUTOMATED COUNT: 13.2 % (ref 11.5–14.5)
GFR SERPLBLD CREATININE-BSD FMLA CKD-EPI: >60 ML/MIN/1.73/M2
GLUCOSE SERPL-MCNC: 86 MG/DL (ref 70–110)
HCT VFR BLD AUTO: 42.3 % (ref 37–48.5)
HDLC SERPL-MCNC: 42 MG/DL (ref 40–75)
HDLC SERPL: 21.1 % (ref 20–50)
HGB BLD-MCNC: 13 GM/DL (ref 12–16)
IMM GRANULOCYTES # BLD AUTO: 0.02 K/UL (ref 0–0.04)
IMM GRANULOCYTES NFR BLD AUTO: 0.3 % (ref 0–0.5)
LDLC SERPL CALC-MCNC: 138.8 MG/DL (ref 63–159)
LYMPHOCYTES # BLD AUTO: 1.83 K/UL (ref 1–4.8)
MCH RBC QN AUTO: 26.3 PG (ref 27–31)
MCHC RBC AUTO-ENTMCNC: 30.7 G/DL (ref 32–36)
MCV RBC AUTO: 86 FL (ref 82–98)
NONHDLC SERPL-MCNC: 157 MG/DL
NUCLEATED RBC (/100WBC) (OHS): 0 /100 WBC
PLATELET # BLD AUTO: 386 K/UL (ref 150–450)
PMV BLD AUTO: 9.3 FL (ref 9.2–12.9)
POTASSIUM SERPL-SCNC: 4 MMOL/L (ref 3.5–5.1)
PROT SERPL-MCNC: 8.1 GM/DL (ref 6–8.4)
RBC # BLD AUTO: 4.95 M/UL (ref 4–5.4)
RELATIVE EOSINOPHIL (OHS): 1.6 %
RELATIVE LYMPHOCYTE (OHS): 23.6 % (ref 18–48)
RELATIVE MONOCYTE (OHS): 7.2 % (ref 4–15)
RELATIVE NEUTROPHIL (OHS): 66.9 % (ref 38–73)
SODIUM SERPL-SCNC: 143 MMOL/L (ref 136–145)
TRIGL SERPL-MCNC: 91 MG/DL (ref 30–150)
TSH SERPL-ACNC: 0.96 UIU/ML (ref 0.4–4)
WBC # BLD AUTO: 7.74 K/UL (ref 3.9–12.7)

## 2025-08-04 PROCEDURE — 83036 HEMOGLOBIN GLYCOSYLATED A1C: CPT

## 2025-08-04 PROCEDURE — 1160F RVW MEDS BY RX/DR IN RCRD: CPT | Mod: CPTII,,,

## 2025-08-04 PROCEDURE — 99385 PREV VISIT NEW AGE 18-39: CPT | Mod: S$PBB,,,

## 2025-08-04 PROCEDURE — 80061 LIPID PANEL: CPT

## 2025-08-04 PROCEDURE — 36415 COLL VENOUS BLD VENIPUNCTURE: CPT | Mod: PN

## 2025-08-04 PROCEDURE — 1159F MED LIST DOCD IN RCRD: CPT | Mod: CPTII,,,

## 2025-08-04 PROCEDURE — 85025 COMPLETE CBC W/AUTO DIFF WBC: CPT

## 2025-08-04 PROCEDURE — 3008F BODY MASS INDEX DOCD: CPT | Mod: CPTII,,,

## 2025-08-04 PROCEDURE — 99999 PR PBB SHADOW E&M-EST. PATIENT-LVL V: CPT | Mod: PBBFAC,,,

## 2025-08-04 PROCEDURE — 99215 OFFICE O/P EST HI 40 MIN: CPT | Mod: PBBFAC,PN

## 2025-08-04 PROCEDURE — 3074F SYST BP LT 130 MM HG: CPT | Mod: CPTII,,,

## 2025-08-04 PROCEDURE — 84443 ASSAY THYROID STIM HORMONE: CPT

## 2025-08-04 PROCEDURE — 99215 OFFICE O/P EST HI 40 MIN: CPT | Mod: S$PBB,25,,

## 2025-08-04 PROCEDURE — 3044F HG A1C LEVEL LT 7.0%: CPT | Mod: CPTII,,,

## 2025-08-04 PROCEDURE — 3078F DIAST BP <80 MM HG: CPT | Mod: CPTII,,,

## 2025-08-04 PROCEDURE — 82040 ASSAY OF SERUM ALBUMIN: CPT

## 2025-08-04 RX ORDER — PHENTERMINE HYDROCHLORIDE 37.5 MG/1
37.5 TABLET ORAL DAILY
COMMUNITY
Start: 2025-05-18

## 2025-08-04 RX ORDER — LIDOCAINE 50 MG/G
1 PATCH TOPICAL DAILY
Qty: 15 PATCH | Refills: 0 | Status: SHIPPED | OUTPATIENT
Start: 2025-08-04 | End: 2025-08-19

## 2025-08-04 NOTE — PROGRESS NOTES
SUBJECTIVE     Chief Complaint   Patient presents with    Establish Care     Pt is here to est care     Leg Injury     Pt is having pain in left leg  due to a fall.        HPI  Felicity Cohen is a 25 y.o. female with multiple medical diagnoses as listed in the medical history and problem list that presents for establishment of care pt is new to me and to the clinic    History of Present Illness    CHIEF COMPLAINT:  Ms. Cohen presents today for follow up of knee surgery and chest pain.    HISTORY OF PRESENT ILLNESS:  She reports a recent episode of chest pain that prompted a hospital visit. CT was difficult to interpret due to body habitus. Labs showed elevated D-dimer, suggesting potential concern for pulmonary embolism. She currently denies active chest pain and reports that both chest pain and associated tachycardia have resolved. She continues to experience persistent knee pain rated 5/10, which is improved but still present. Pain is localized to the knee without lower leg involvement.    DERMATOLOGIC:  She reports a skin condition on her chest above the breast and continues prescribed triamcinolone cream for chest dermatitis which has been helpful.    GERD:  She continues omeprazole for GERD with persistent symptoms, including occasional foam and meat regurgitation.    MEDICATIONS:  She is currently taking Adderall, recently restarted Adipex, continues Tramadol, uses triamcinolone cream for chest dermatitis, takes Colace for constipation, continues omeprazole for GERD, and takes Fioricet for migraines which are currently controlled.    MEDICAL HISTORY:  She has a history of anxiety, depression, ADHD, gastric reflux, irritable bowel syndrome with constipation, and ovarian cyst when younger. She notes that anxiety has caused bodily effects in the past.    SURGICAL HISTORY:  She has a history of knee surgery on the same side as a nerve decompression procedure and wisdom teeth removal.    FAMILY HISTORY:  Mother  is alive and well without medications. Father is alive with possible uncontrolled diabetes with glucose levels at 300. She has three sisters, one recently recovered from H. pylori. Her brother is .    SOCIAL HISTORY:  She drinks alcohol once per month, has no children, and denies tobacco, marijuana, or other drug use.    ALLERGIES:  She reports an allergy to ibuprofen which causes hives. She denies allergies to other medications including cetirizine.      ROS:  General: -fever, -chills, -fatigue, -weight gain, -weight loss  Eyes: -vision changes, -redness, -discharge  ENT: -ear pain, -nasal congestion, -sore throat  Cardiovascular: +chest pain, -palpitations, -lower extremity edema  Respiratory: -cough, -shortness of breath  Gastrointestinal: -abdominal pain, -nausea, -vomiting, -diarrhea, -constipation, -blood in stool  Genitourinary: -dysuria, -hematuria, -frequency  Musculoskeletal: +joint pain, -muscle pain, +limb pain  Skin: +rash, -lesion  Neurological: -headache, -dizziness, -numbness, -tingling, +migraines  Psychiatric: -anxiety, -depression, -sleep difficulty           PAST MEDICAL HISTORY:  Past Medical History:   Diagnosis Date    ADHD (attention deficit hyperactivity disorder)     Anxiety     Constipation     Depression     GERD (gastroesophageal reflux disease)     IBS (irritable bowel syndrome)     Ovarian cyst     Schizophrenia in children        PAST SURGICAL HISTORY:  Past Surgical History:   Procedure Laterality Date    ARTHROSCOPIC CHONDROPLASTY OF KNEE JOINT Left 2025    Procedure: ARTHROSCOPY, KNEE, W/ CHONDROPLASTY;  Surgeon: Jhony Berry MD;  Location: [address];  Service: Orthopedics;  Laterality: Left;    ARTHROSCOPIC RECONSTRUCTION OF POSTEROLATERAL CORNER OF KNEE Left 2025    Procedure: RECONSTRUCTION, KNEE, POSTEROLATERAL CORNER;  Surgeon: Jhony Berry MD;  Location: [facility] OR [address] [place] [address];  Service: Orthopedics;  Laterality: Left;    ARTHROSCOPY,KNEE,WITH  MENISCUS REPAIR Left 12/12/2024    Procedure: ARTHROSCOPY,KNEE,WITH MENISCUS REPAIR;  Surgeon: Jhony Berry MD;  Location: [address];  Service: Orthopedics;  Laterality: Left;    DECOMPRESSION OF NERVE Left 12/12/2024    Procedure: DECOMPRESSION, NERVE;  Surgeon: Jhony Berry MD;  [facility];  Service: Orthopedics;  Laterality: Left;    DECOMPRESSION, NERVE, PERONEAL Left 06/05/2025    Procedure: DECOMPRESSION, PERONEAL NERVE;  Surgeon: Aureliano Rousseau MD;  Location: [address];  Service: Plastics;  Laterality: Left;    ESOPHAGOGASTRODUODENOSCOPY N/A 02/08/2022    Procedure: EGD (ESOPHAGOGASTRODUODENOSCOPY);  Surgeon: Berkley Mckenna MD;  Location: [facility];  Service: Endoscopy;  Laterality: N/A;    KNEE ARTHROSCOPY W/ MENISCECTOMY Left 06/05/2025    Procedure: ARTHROSCOPY, KNEE, W/ MENISCECTOMY;  Surgeon: Jhony Berry MD;  Location: [address];  Service: Orthopedics;  Laterality: Left;    KNEE ARTHROSCOPY W/ PLICA EXCISION Left 06/05/2025    Procedure: ARTHROSCOPY, KNEE, W/ PLICA EXCISION;  Surgeon: Jhony Berry MD;  Location: [address];  Service: Orthopedics;  Laterality: Left;    LYSIS, ADHESIONS, KNEE, ARTHROSCOPIC Left 06/05/2025    Procedure: ARTHROSCOPY, KNEE, W/ LYSIS OF ADHESIONS;  Surgeon: Jhony Berry MD;  Location: [address];  Service: Orthopedics;  Laterality: Left;    RECONSTRUCTION OF ANTERIOR CRUCIATE LIGAMENT USING GRAFT Left 12/12/2024    Procedure: RECONSTRUCTION, KNEE, ACL, QUAD TENDON AUTOGRAFT;  Surgeon: Jhony Berry MD;  Location: [address];  Service: Orthopedics;  Laterality: Left;    RECONSTRUCTION OF LIGAMENT Left 12/12/2024    Procedure: RECONSTRUCTION, POSTEROLATERAL CORNER (PLC) WITH ALLOGRAFT;  Surgeon: Jhony Berry MD;  Location: [address];  Service: Orthopedics;  Laterality: Left;    RECONSTRUCTION OF POSTEROLATERAL CORNER OF KNEE Left 06/05/2025    Procedure: REVISION  "RECONSTRUCTION, KNEE, POSTEROLATERAL CORNER;  Surgeon: Jhony Berry MD;  Location: [facility] OR [address] [place] [address];  Service: Orthopedics;  Laterality: Left;    RECONSTRUCTION, LIGAMENT, KNEE, EXTRA-ARTICULAR Left 12/12/2024    Procedure: RECONSTRUCTION, LIGAMENT, KNEE, EXTRA-ARTICULAR;  Surgeon: Jhony Berry MD;  Location: [facility] OR [address] [place] [address];  Service: Orthopedics;  Laterality: Left;    SYNOVECTOMY, KNEE, LIMITED, ARTHROSCOPIC Left 12/12/2024    Procedure: SYNOVECTOMY, KNEE, LIMITED, ARTHROSCOPIC;  Surgeon: Jhony Berry MD;  Location: [facility] OR [address] [place] [address];  Service: Orthopedics;  Laterality: Left;    WISDOM TOOTH EXTRACTION         SOCIAL HISTORY:  Social History[1]    FAMILY HISTORY:  Family History   Problem Relation Name Age of Onset    Asthma Mother      Diabetes Father      No Known Problems Sister      SIDS Brother      No Known Problems Maternal Grandmother      No Known Problems Maternal Grandfather      No Known Problems Paternal Grandmother      No Known Problems Paternal Grandfather      No Known Problems Daughter      No Known Problems Son      No Known Problems Maternal Aunt      No Known Problems Maternal Uncle      No Known Problems Paternal Aunt      No Known Problems Paternal Uncle      No Known Problems Maternal Cousin      No Known Problems Paternal Cousin      No Known Problems Other         ALLERGIES AND MEDICATIONS: updated and reviewed.  Review of patient's allergies indicates:   Allergen Reactions    Ibuprofen Hives     Current Medications[2]        OBJECTIVE     Physical Exam  Vitals:    08/04/25 1002   BP: 122/72   Pulse: 92   Resp: 18   Temp: 96.9 °F (36.1 °C)    Body mass index is 62.51 kg/m².  Weight: (!) 170.4 kg (375 lb 10.6 oz)   Height: 5' 5" (165.1 cm)       Health Maintenance         Date Due Completion Date    Pap Smear Never done ---    COVID-19 Vaccine (3 - 2024-25 season) 09/01/2024 10/25/2022    Influenza Vaccine (1) 09/01/2025 9/29/2024    TETANUS VACCINE 10/02/2032 10/2/2022    " RSV Vaccine (Age 60+ and Pregnant patients) (1 - 1-dose 75+ series) 06/02/2073 ---              ASSESSMENT     25 y.o. female with     1. Encounter to establish care with new provider    2. Chest pain    3. Tachycardia    4. Encounter for well woman exam    5. Atopic dermatitis, unspecified type    6. Gastroesophageal reflux disease without esophagitis    7. Chronic pain of left knee        PLAN:     1. Encounter to establish care with new provider  Counseled on age appropriate medical preventative services, including age appropriate cancer screenings, over all nutritional health, need for a consistent exercise regimen and an over all push towards maintaining a vigorous and active lifestyle.  Counseled on age appropriate vaccines and discussed upcoming health care needs based on age/gender.  Spent time with patient counseling on need for a good patient/provider relationship moving forward.  Discussed use of common OTC medications and supplements.  Discussed common dietary aids and use of caffeine and the need for good sleep hygiene and stress management.  - CBC Auto Differential; Future  - Comprehensive Metabolic Panel; Future  - Hemoglobin A1C; Future  - Lipid Panel; Future  - TSH; Future    2. Chest pain  New; Reviewed ER notes and encounter; Stable   - Ambulatory referral/consult to Family Practice    3. Tachycardia  New; Reviewed ER notes and encounter; Stable   - Ambulatory referral/consult to Family Practice    4. Encounter for well woman exam  New; Addressing  - Ambulatory referral/consult to Gynecology; Future    5. Atopic dermatitis, unspecified type  New; Treating  - Ambulatory referral/consult to Dermatology; Future    6. Gastroesophageal reflux disease without esophagitis  New; Discussed; addressing     7. Chronic pain of left knee  New; Treating   - LIDOcaine (LIDODERM) 5 %; Place 1 patch onto the skin once daily. Remove & Discard patch within 12 hours or as directed by MD for 15 days  Dispense: 15  patch; Refill: 0    Assessment & Plan    K21.9 Gastro-esophageal reflux disease without esophagitis  F32.9 Major depressive disorder, single episode, unspecified  F41.9 Anxiety disorder, unspecified  F90.9 Attention-deficit hyperactivity disorder, unspecified type  K58.1 Irritable bowel syndrome with constipation  L30.9 Dermatitis, unspecified  G43.909 Migraine, unspecified, not intractable, without status migrainosus  Z84.2 Family history of other diseases of the genitourinary system  Z88.6 Allergy status to analgesic agent    GASTRO-ESOPHAGEAL REFLUX DISEASE (GERD):  - Ms. Cohen reports feelings of foot rising from stomach after eating.  - Symptoms are stable but still present.  - Currently managed with omeprazole.  - Ms. Cohen has a gastroenterologist away but recommended to establish care with a local gastroenterologist for continuous monitoring of symptoms not fully controlled with current regimen.    DEPRESSION:  - Documented patient's past medical history of depression.    ANXIETY DISORDER:  - Documented patient's past medical history of anxiety.  - Discussed potential relationship between anxiety and physical symptoms.    ATTENTION-DEFICIT HYPERACTIVITY DISORDER (ADHD):  - Documented patient's past medical history of ADHD.    IRRITABLE BOWEL SYNDROME WITH CONSTIPATION:  - Documented patient's past medical history of irritable bowel syndrome with constipation.    DERMATITIS:  - Ms. Cohen has been monitoring dermatitis on chest with uncertain improvement.  - Prescribed triamcinolone cream and recommended dermatology consultation for management.    MIGRAINE:  - Migraines are currently controlled.  - Prescribed Fioricet for management.    FAMILY HISTORY OF GENITOURINARY SYSTEM DISEASES:  - Documented that patient's sister had H.  - Pylori.    ALLERGY TO ANALGESIC AGENT:  - Documented allergy to ibuprofen, which causes urticaria.    POST-OPERATIVE CARE AND PAIN MANAGEMENT:  - Assessed post-op status following  knee surgery.  - Chest pain and tachycardia reported during recent hospital visit have resolved.  - D-dimer results evaluated with no further workup needed for suspected blood clot as patient is and has been absent of any symptoms currently. Normal heart rate and blood pressure, denies any SOB or chest pains  - Started lidocaine patches for knee pain management.  - Ms. Cohen to continue icing and elevating knee in evenings and to see orthopedic surgeon before next appointment.    GENERAL HEALTH MANAGEMENT:  - Reviewed and adjusted medication list based on current symptoms and needs.  - Ordered comprehensive lab panel including CBC, CMP, thyroid function, and A1C to assess overall health status and explained purpose of tests.    FOLLOW-UP:  - Follow up in 4 weeks to review lab results and reassess knee condition.   - instructed to follow up if needed to be seen sooner    I spent a total of 42 minutes on the day of the visit.This includes face to face time and non-face to face time preparing to see the patient (eg, review of tests), obtaining and/or reviewing separately obtained history, documenting clinical information in the electronic or other health record, independently interpreting results and communicating results to the patient/family/caregiver, or care coordinator.       Jose Cordova DNP, APRN, FNP-BC  [facility]  08/06/2025 10:43 AM      This note was generated with the assistance of ambient listening technology. Verbal consent was obtained by the patient and accompanying visitor(s) for the recording of patient appointment to facilitate this note. I attest to having reviewed and edited the generated note for accuracy, though some syntax or spelling errors may persist. Please contact the author of this note for any clarification.            [1]   Social History  Socioeconomic History    Marital status: Single    Number of children: 0   Occupational History    Occupation: none    Occupation:  Student at [employer]   Tobacco Use    Smoking status: Never     Passive exposure: Yes    Smokeless tobacco: Never   Vaping Use    Vaping status: Never Used   Substance and Sexual Activity    Alcohol use: Yes     Alcohol/week: 1.0 standard drink of alcohol     Types: 1 Drinks containing 0.5 oz of alcohol per week     Comment: socially    Drug use: Never    Sexual activity: Not Currently     Partners: Male   [2]   Current Outpatient Medications   Medication Sig Dispense Refill    acetaminophen (TYLENOL) 325 MG tablet Take 325 mg by mouth every 6 (six) hours as needed.      butalbital-acetaminophen-caff -40 mg Cap Take 1 capsule by mouth daily as needed.      methocarbamoL (ROBAXIN) 500 MG Tab Take 1 tablet (500 mg total) by mouth 3 (three) times daily as needed (muscle spasms). 30 tablet 0    omeprazole (PRILOSEC OTC) 20 MG tablet Take 20 mg by mouth once daily.      phentermine (ADIPEX-P) 37.5 mg tablet Take 37.5 mg by mouth once daily.      triamcinolone acetonide 0.1% (KENALOG) 0.1 % ointment Apply topically 2 (two) times daily. To affected area 80 g 0    LIDOcaine (LIDODERM) 5 % Place 1 patch onto the skin once daily. Remove & Discard patch within 12 hours or as directed by MD for 15 days 15 patch 0     No current facility-administered medications for this visit.

## 2025-08-05 LAB
EAG (OHS): 100 MG/DL (ref 68–131)
HBA1C MFR BLD: 5.1 % (ref 4–5.6)

## 2025-08-06 ENCOUNTER — CLINICAL SUPPORT (OUTPATIENT)
Dept: REHABILITATION | Facility: HOSPITAL | Age: 25
End: 2025-08-06
Payer: MEDICAID

## 2025-08-06 ENCOUNTER — PATIENT MESSAGE (OUTPATIENT)
Dept: SPORTS MEDICINE | Facility: CLINIC | Age: 25
End: 2025-08-06
Payer: MEDICAID

## 2025-08-06 DIAGNOSIS — M25.562 ACUTE PAIN OF LEFT KNEE: Primary | ICD-10-CM

## 2025-08-06 PROCEDURE — 97110 THERAPEUTIC EXERCISES: CPT | Mod: CQ

## 2025-08-06 NOTE — PROGRESS NOTES
Outpatient Rehab    Physical Therapy Visit    Patient Name: Felicity Cohen  MRN: [id number]  YOB: 2000  Encounter Date: 8/6/2025    Therapy Diagnosis:   Encounter Diagnosis   Name Primary?    Acute pain of left knee Yes       Physician: Jhony Berry MD    Physician Orders: Eval and Treat  Medical Diagnosis: Rupture of anterior cruciate ligament of left knee, initial encounter  Tear of lateral collateral ligament of left knee, initial encounter  Acute medial meniscal injury of left knee, subsequent encounter  Surgical Diagnosis: Not applicable for this Episode   Surgical Date: Not applicable for this Episode  Days Since Last Surgery: Not applicable for this Episode    Visit # / Visits Authorized:  39 / 68  Insurance Authorization Period: 1/1/2025 to 8/17/2025  Date of Evaluation: 10/24/2024  Plan of Care Certification:       Time In: 1500   Time Out: 1600  Total Time (in minutes): 60   Total Billable Time (in minutes): 60  PT tech extender used within line of site.  FOTO:  Intake Score (%): Not applicable for this Episode  Survey Score 2 (%): Not applicable for this Episode  Survey Score 3 (%): Not applicable for this Episode    Precautions: DOS: 6/5/25  Right Lower Extremity Weight-Bearing Status: Non-weight-bearing  Additional Precautions and Protocol Details: POSTOPERATIVE PLAN:  She will follow the posterolateral corner reconstruction protocol.  Return to clinic in 2 weeks      Subjective   Can I go back to clinicals? I have to be off of crutches and it will be 11 weeks post op.         Objective            Treatment:     Felicity received therapeutic exercises to develop strength, endurance, ROM, flexibility, posture, and core stabilization for 55 minutes including:  Bike x10 min in available ROM  NMES x 20 min (10 sec on/30 sec off)    - SAQ 1/2 foam              - SLR off EOT    Heelslides (passively) x5 min  Standing mini squat 50% WB  Standing heel raise 4x10  Standing TKE with wt shift  "Red power band 3x10 5" hold    Np:  SL hip abd 2x10 1#-np  SLR 2x10 1#-np  Prone hip ext  2x10 1#-np  Ankle pumps grey band 40x-np  STM x5 min for scar tissue management and patellar mobs-np  Pool education x5 min  Pt education and HEP review    Time Entry(in minutes):  Therapeutic Exercise Time Entry: 60    Assessment & Plan   Assessment: Felicity achieved 95 degrees of flexion. Pt told she can work on improving her flexion at home. No pain with 50% WB. Pt ok to start increasing wt towards 75% and can use 1 crutch. Pt will reach out to MD to see about WB precaution and going back to school as clinicals will require her to be full WB without AD. Was able to initiate OKC LAQ with strap assist as well as WB exercises with protocol WB limits. Will cont to progress as tolerated.       The patient will continue to benefit from skilled outpatient physical therapy in order to address the deficits listed in the problem list on the initial evaluation, provide patient and family education, and maximize the patients level of independence in the home and community environments.     The patient's spiritual, cultural, and educational needs were considered, and the patient is agreeable to the plan of care and goals.           Plan: Cotninue POC per pt tolerance    Goals:   Active       Functional outcome       Patient will show a significant change in FOTO patient-reported outcome tool to demonstrate subjective improvement to 80% PLOF       Start:  06/11/25    Expected End:  11/26/25            Patient stated goal:to participate in clinicals       Start:  06/11/25    Expected End:  09/03/25            Patient will demonstrate independence in home program for support of progression (Met)       Start:  06/11/25    Expected End:  07/23/25    Resolved:  07/31/25            Pain       Patient will report pain of 2/10 demonstrating a reduction of overall pain       Start:  06/11/25    Expected End:  09/03/25               Range of " Motion       Patient will achieve left knee ROM of  +10 hyperextension-110 flexion degrees        Start:  06/11/25    Expected End:  09/03/25               Strength       Patient will achieve left knee flexion strength of 5/5       Start:  06/11/25    Expected End:  11/26/25            Patient will achieve left knee extension strength of 5/5       Start:  06/11/25    Expected End:  11/26/25                  Edwina Kelly, PTA

## 2025-08-12 ENCOUNTER — CLINICAL SUPPORT (OUTPATIENT)
Dept: REHABILITATION | Facility: HOSPITAL | Age: 25
End: 2025-08-12
Payer: MEDICAID

## 2025-08-12 DIAGNOSIS — M25.562 ACUTE PAIN OF LEFT KNEE: Primary | ICD-10-CM

## 2025-08-12 PROCEDURE — 97110 THERAPEUTIC EXERCISES: CPT

## 2025-08-15 ENCOUNTER — CLINICAL SUPPORT (OUTPATIENT)
Dept: REHABILITATION | Facility: HOSPITAL | Age: 25
End: 2025-08-15
Payer: MEDICAID

## 2025-08-15 DIAGNOSIS — M25.562 ACUTE PAIN OF LEFT KNEE: Primary | ICD-10-CM

## 2025-08-15 PROCEDURE — 97110 THERAPEUTIC EXERCISES: CPT | Mod: CQ

## 2025-08-18 ENCOUNTER — PATIENT MESSAGE (OUTPATIENT)
Dept: SPORTS MEDICINE | Facility: CLINIC | Age: 25
End: 2025-08-18
Payer: MEDICAID

## 2025-08-19 ENCOUNTER — CLINICAL SUPPORT (OUTPATIENT)
Dept: REHABILITATION | Facility: HOSPITAL | Age: 25
End: 2025-08-19
Payer: MEDICAID

## 2025-08-19 DIAGNOSIS — M25.562 ACUTE PAIN OF LEFT KNEE: Primary | ICD-10-CM

## 2025-08-19 PROCEDURE — 97110 THERAPEUTIC EXERCISES: CPT

## 2025-08-21 ENCOUNTER — TELEPHONE (OUTPATIENT)
Dept: SPORTS MEDICINE | Facility: CLINIC | Age: 25
End: 2025-08-21
Payer: MEDICAID

## 2025-08-21 ENCOUNTER — PATIENT MESSAGE (OUTPATIENT)
Dept: SPORTS MEDICINE | Facility: CLINIC | Age: 25
End: 2025-08-21
Payer: MEDICAID

## 2025-08-26 ENCOUNTER — CLINICAL SUPPORT (OUTPATIENT)
Dept: REHABILITATION | Facility: HOSPITAL | Age: 25
End: 2025-08-26
Payer: MEDICAID

## 2025-08-26 DIAGNOSIS — M25.562 ACUTE PAIN OF LEFT KNEE: Primary | ICD-10-CM

## 2025-08-26 PROCEDURE — 97110 THERAPEUTIC EXERCISES: CPT

## 2025-09-02 ENCOUNTER — CLINICAL SUPPORT (OUTPATIENT)
Dept: REHABILITATION | Facility: HOSPITAL | Age: 25
End: 2025-09-02
Payer: MEDICAID

## 2025-09-02 DIAGNOSIS — M25.562 ACUTE PAIN OF LEFT KNEE: Primary | ICD-10-CM

## 2025-09-02 PROCEDURE — 97110 THERAPEUTIC EXERCISES: CPT

## (undated) DEVICE — SYR 30CC LUER LOCK

## (undated) DEVICE — PAD ABDOMINAL STERILE 8X10IN

## (undated) DEVICE — KIT INST DISP KNEE FIBERTAK

## (undated) DEVICE — BLADE SAGITTA 5/BX

## (undated) DEVICE — SOL IRR NACL .9% 3000ML

## (undated) DEVICE — SPONGE LAP 18X18 PREWASHED

## (undated) DEVICE — SUT VICRYL PLUS 0 CT1 18IN

## (undated) DEVICE — SPONGE COTTON TRAY 4X4IN

## (undated) DEVICE — REAMER LOW PROFILE 7MM

## (undated) DEVICE — SCALPEL #10 BLADE STRL DISP

## (undated) DEVICE — NDL SURGEON MAYO #4

## (undated) DEVICE — REPAIR KIT SMALL JOINT BIO TEN

## (undated) DEVICE — ELECTRODE REM PLYHSV RETURN 9

## (undated) DEVICE — DRAPE U SPLIT SHEET 54X76IN

## (undated) DEVICE — SHAVER SYS 5.5 ULRAFRR

## (undated) DEVICE — KIT ACL DISP W/O SAW BLADE

## (undated) DEVICE — SUT VICRYL CTD 2-0 GI 27 SH

## (undated) DEVICE — TOURNIQUET SB QC DP 34X4IN

## (undated) DEVICE — TUBE SET INFLOW/OUTFLOW

## (undated) DEVICE — ELECTRODE MEGADYNE RETURN DUAL

## (undated) DEVICE — BLADE SHAVER LANZA 4.2X13CM

## (undated) DEVICE — TUBING SUC UNIV W/CONN 12FT

## (undated) DEVICE — DRESSING AQUACEL FOAM 5 X 5

## (undated) DEVICE — CUP MEDICINE STERILE 2OZ

## (undated) DEVICE — BLADE VORTEX SHAVER 4.5MMX13CM

## (undated) DEVICE — NDL ECLIPSE SAFETY 18GX1-1/2IN

## (undated) DEVICE — KIT FIBERTAK PERC INSRT 1.8MM

## (undated) DEVICE — APPLICATOR CHLORAPREP ORN 26ML

## (undated) DEVICE — TRAY MINOR ORTHO OMC

## (undated) DEVICE — SUT LIGACLIP SMALL XTRA

## (undated) DEVICE — SUT SUTURETAPE TIGERLINK 1.3MM

## (undated) DEVICE — NDL SPINAL 18GX3.5 SPINOCAN

## (undated) DEVICE — SUT FIBERLINK TAPE BLU WHT 1.3

## (undated) DEVICE — DRAPE TOP 53X102IN

## (undated) DEVICE — PAD COLD THERAPY KNEE WRAP ON

## (undated) DEVICE — SUT MONOCRYL 3-0 PS-1

## (undated) DEVICE — SUT VICRYL+ 1 CT1 18IN

## (undated) DEVICE — PROBE ARTHSCP EDGE ENERGY 50

## (undated) DEVICE — DRAPE STERI U-SHAPED 47X51IN

## (undated) DEVICE — SUT MCRYL PLUS 3-0 PS2 27IN

## (undated) DEVICE — GRAFT HARVESTER QUADPRO 9MM

## (undated) DEVICE — GOWN SMART IMP BREATHABLE XXLG

## (undated) DEVICE — SUT VICRYL BR 1 GEN 27 CT-1

## (undated) DEVICE — CUTTER SUT KNOT PUSH PRTL SKID

## (undated) DEVICE — SUT ETHILON 3/0 18IN PS-1

## (undated) DEVICE — SUT VICRYL PLUS 3-0 SH 18IN

## (undated) DEVICE — SUT TAPE TIGERLOOP 1.3MM

## (undated) DEVICE — LOOP VESSEL BLUE MAXI

## (undated) DEVICE — SUT 0 VICRYL / CT-1

## (undated) DEVICE — TOURNIQUET SB QC DP 24X4IN

## (undated) DEVICE — DRAPE THREE-QTR REINF 53X77IN

## (undated) DEVICE — REAMER LOW PROFILE 10 MM

## (undated) DEVICE — BOWL STERILE LARGE 32OZ

## (undated) DEVICE — NDL SUREFIRE SCORPION RC

## (undated) DEVICE — PENCIL ROCKER SWITCH 10FT CORD

## (undated) DEVICE — TAPE SURG DURAPORE 2 X10YD

## (undated) DEVICE — TRAY ARTHROSCOPY 2/CS

## (undated) DEVICE — DRAPE ARTHSCP T ORTHOMAX POUCH

## (undated) DEVICE — BRACE KNEE T SCOPE PREMIER

## (undated) DEVICE — PIN DRILL ACL T ROPE 4MM

## (undated) DEVICE — DRESSING MEPILEX FLEX 3X3IN

## (undated) DEVICE — ADHESIVE DERMABOND ADVANCED

## (undated) DEVICE — NDL HYPO STD REG BVL 18GX1.5IN

## (undated) DEVICE — GOWN AERO CHROME W/ TOWEL XL

## (undated) DEVICE — DRESSING AQUACEL AG ADV 3.5X12

## (undated) DEVICE — PACK ECLIPSE SET-UP W/O DRAPE

## (undated) DEVICE — LOOP VESSEL BLUE MINI 2/CARD

## (undated) DEVICE — SUT SUTURETAPE X 1.3MM 40IN

## (undated) DEVICE — SUT ETHICON 3-0 BLK MONO PS

## (undated) DEVICE — BANDAGE ELAS SOFTWRAP ST 6X5YD

## (undated) DEVICE — SUT BLU BR 2 TAPERD NDL 1/2

## (undated) DEVICE — Device

## (undated) DEVICE — DRAPE STERI INSTRUMENT 1018

## (undated) DEVICE — BIT DRILL CANNULATED 10MM

## (undated) DEVICE — SUT VICRYL PLUS 2-0 CT1 18

## (undated) DEVICE — DRESSING AQUACEL FOAM 4 X 12

## (undated) DEVICE — REAMER LOW PROFILE 8MM

## (undated) DEVICE — PUMP COLD THERAPY

## (undated) DEVICE — #2 FIBERSNARE

## (undated) DEVICE — SUT TAPE FIBERLOOP 1.3MM

## (undated) DEVICE — MARKER SKIN RULER STERILE

## (undated) DEVICE — DRESSING GAUZE OIL EMUL 3X8